# Patient Record
Sex: FEMALE | Race: WHITE | NOT HISPANIC OR LATINO | Employment: OTHER | URBAN - NONMETROPOLITAN AREA
[De-identification: names, ages, dates, MRNs, and addresses within clinical notes are randomized per-mention and may not be internally consistent; named-entity substitution may affect disease eponyms.]

---

## 2019-05-30 ENCOUNTER — APPOINTMENT (OUTPATIENT)
Dept: CT IMAGING | Facility: HOSPITAL | Age: 79
End: 2019-05-30

## 2019-05-30 ENCOUNTER — HOSPITAL ENCOUNTER (EMERGENCY)
Facility: HOSPITAL | Age: 79
Discharge: HOME | End: 2019-05-30
Attending: EMERGENCY MEDICINE | Admitting: EMERGENCY MEDICINE

## 2019-05-30 ENCOUNTER — APPOINTMENT (OUTPATIENT)
Dept: GENERAL RADIOLOGY | Facility: HOSPITAL | Age: 79
End: 2019-05-30

## 2019-05-30 VITALS
HEIGHT: 67 IN | SYSTOLIC BLOOD PRESSURE: 146 MMHG | BODY MASS INDEX: 37.67 KG/M2 | OXYGEN SATURATION: 95 % | HEART RATE: 78 BPM | TEMPERATURE: 99.6 F | RESPIRATION RATE: 20 BRPM | WEIGHT: 240 LBS | DIASTOLIC BLOOD PRESSURE: 57 MMHG

## 2019-05-30 DIAGNOSIS — N39.0 ACUTE UTI: ICD-10-CM

## 2019-05-30 DIAGNOSIS — R50.9 FEVER AND CHILLS: Primary | ICD-10-CM

## 2019-05-30 LAB
ALBUMIN SERPL-MCNC: 3.9 G/DL (ref 3.5–5)
ALBUMIN/GLOB SERPL: 1.3 G/DL (ref 1–2)
ALP SERPL-CCNC: 52 U/L (ref 38–126)
ALT SERPL W P-5'-P-CCNC: 41 U/L (ref 13–69)
ANION GAP SERPL CALCULATED.3IONS-SCNC: 13.9 MMOL/L (ref 10–20)
AST SERPL-CCNC: 33 U/L (ref 15–46)
BACTERIA UR QL AUTO: ABNORMAL /HPF
BASOPHILS # BLD AUTO: 0.03 10*3/MM3 (ref 0–0.2)
BASOPHILS NFR BLD AUTO: 0.4 % (ref 0–1.5)
BILIRUB SERPL-MCNC: 1 MG/DL (ref 0.2–1.3)
BILIRUB UR QL STRIP: NEGATIVE
BUN BLD-MCNC: 18 MG/DL (ref 7–20)
BUN/CREAT SERPL: 22.5 (ref 7.1–23.5)
CALCIUM SPEC-SCNC: 8.9 MG/DL (ref 8.4–10.2)
CHLORIDE SERPL-SCNC: 104 MMOL/L (ref 98–107)
CLARITY UR: CLEAR
CO2 SERPL-SCNC: 25 MMOL/L (ref 26–30)
COLOR UR: YELLOW
CREAT BLD-MCNC: 0.8 MG/DL (ref 0.6–1.3)
D-LACTATE SERPL-SCNC: 2.1 MMOL/L (ref 0.5–2)
DEPRECATED RDW RBC AUTO: 49.9 FL (ref 37–54)
EOSINOPHIL # BLD AUTO: 0.03 10*3/MM3 (ref 0–0.4)
EOSINOPHIL NFR BLD AUTO: 0.4 % (ref 0.3–6.2)
ERYTHROCYTE [DISTWIDTH] IN BLOOD BY AUTOMATED COUNT: 14.6 % (ref 12.3–15.4)
FLUAV AG NPH QL: NEGATIVE
FLUBV AG NPH QL IA: NEGATIVE
GFR SERPL CREATININE-BSD FRML MDRD: 70 ML/MIN/1.73
GLOBULIN UR ELPH-MCNC: 3.1 GM/DL
GLUCOSE BLD-MCNC: 102 MG/DL (ref 74–98)
GLUCOSE UR STRIP-MCNC: NEGATIVE MG/DL
HCT VFR BLD AUTO: 38.6 % (ref 34–46.6)
HGB BLD-MCNC: 12.8 G/DL (ref 12–15.9)
HGB UR QL STRIP.AUTO: NEGATIVE
HOLD SPECIMEN: NORMAL
HYALINE CASTS UR QL AUTO: ABNORMAL /LPF
IMM GRANULOCYTES # BLD AUTO: 0.02 10*3/MM3 (ref 0–0.05)
IMM GRANULOCYTES NFR BLD AUTO: 0.3 % (ref 0–0.5)
KETONES UR QL STRIP: NEGATIVE
LEUKOCYTE ESTERASE UR QL STRIP.AUTO: ABNORMAL
LYMPHOCYTES # BLD AUTO: 0.29 10*3/MM3 (ref 0.7–3.1)
LYMPHOCYTES NFR BLD AUTO: 4.2 % (ref 19.6–45.3)
MCH RBC QN AUTO: 30.8 PG (ref 26.6–33)
MCHC RBC AUTO-ENTMCNC: 33.2 G/DL (ref 31.5–35.7)
MCV RBC AUTO: 92.8 FL (ref 79–97)
MONOCYTES # BLD AUTO: 0.05 10*3/MM3 (ref 0.1–0.9)
MONOCYTES NFR BLD AUTO: 0.7 % (ref 5–12)
NEUTROPHILS # BLD AUTO: 6.42 10*3/MM3 (ref 1.7–7)
NEUTROPHILS NFR BLD AUTO: 94 % (ref 42.7–76)
NITRITE UR QL STRIP: NEGATIVE
NRBC BLD AUTO-RTO: 0 /100 WBC (ref 0–0.2)
NT-PROBNP SERPL-MCNC: 135 PG/ML (ref 0–450)
PH UR STRIP.AUTO: <=5 [PH] (ref 5–8)
PLATELET # BLD AUTO: 159 10*3/MM3 (ref 140–450)
PMV BLD AUTO: 11.8 FL (ref 6–12)
POTASSIUM BLD-SCNC: 3.9 MMOL/L (ref 3.5–5.1)
PROCALCITONIN SERPL-MCNC: <0.05 NG/ML
PROT SERPL-MCNC: 7 G/DL (ref 6.3–8.2)
PROT UR QL STRIP: NEGATIVE
RBC # BLD AUTO: 4.16 10*6/MM3 (ref 3.77–5.28)
RBC # UR: ABNORMAL /HPF
REF LAB TEST METHOD: ABNORMAL
SODIUM BLD-SCNC: 139 MMOL/L (ref 137–145)
SP GR UR STRIP: 1.02 (ref 1–1.03)
SQUAMOUS #/AREA URNS HPF: ABNORMAL /HPF
TROPONIN I SERPL-MCNC: <0.012 NG/ML (ref 0–0.03)
UROBILINOGEN UR QL STRIP: ABNORMAL
WBC NRBC COR # BLD: 6.84 10*3/MM3 (ref 3.4–10.8)
WBC UR QL AUTO: ABNORMAL /HPF

## 2019-05-30 PROCEDURE — 81001 URINALYSIS AUTO W/SCOPE: CPT | Performed by: EMERGENCY MEDICINE

## 2019-05-30 PROCEDURE — 99285 EMERGENCY DEPT VISIT HI MDM: CPT

## 2019-05-30 PROCEDURE — 84484 ASSAY OF TROPONIN QUANT: CPT | Performed by: NURSE PRACTITIONER

## 2019-05-30 PROCEDURE — 25010000002 IOPAMIDOL 61 % SOLUTION: Performed by: EMERGENCY MEDICINE

## 2019-05-30 PROCEDURE — 74177 CT ABD & PELVIS W/CONTRAST: CPT

## 2019-05-30 PROCEDURE — P9612 CATHETERIZE FOR URINE SPEC: HCPCS

## 2019-05-30 PROCEDURE — 96374 THER/PROPH/DIAG INJ IV PUSH: CPT

## 2019-05-30 PROCEDURE — 85025 COMPLETE CBC W/AUTO DIFF WBC: CPT | Performed by: NURSE PRACTITIONER

## 2019-05-30 PROCEDURE — 96361 HYDRATE IV INFUSION ADD-ON: CPT

## 2019-05-30 PROCEDURE — 80053 COMPREHEN METABOLIC PANEL: CPT | Performed by: NURSE PRACTITIONER

## 2019-05-30 PROCEDURE — 25010000002 ONDANSETRON PER 1 MG: Performed by: NURSE PRACTITIONER

## 2019-05-30 PROCEDURE — 96375 TX/PRO/DX INJ NEW DRUG ADDON: CPT

## 2019-05-30 PROCEDURE — 83880 ASSAY OF NATRIURETIC PEPTIDE: CPT | Performed by: NURSE PRACTITIONER

## 2019-05-30 PROCEDURE — 25010000002 KETOROLAC TROMETHAMINE PER 15 MG: Performed by: NURSE PRACTITIONER

## 2019-05-30 PROCEDURE — 84145 PROCALCITONIN (PCT): CPT | Performed by: NURSE PRACTITIONER

## 2019-05-30 PROCEDURE — 87804 INFLUENZA ASSAY W/OPTIC: CPT | Performed by: NURSE PRACTITIONER

## 2019-05-30 PROCEDURE — 71046 X-RAY EXAM CHEST 2 VIEWS: CPT

## 2019-05-30 PROCEDURE — 83605 ASSAY OF LACTIC ACID: CPT | Performed by: NURSE PRACTITIONER

## 2019-05-30 RX ORDER — ONDANSETRON 2 MG/ML
4 INJECTION INTRAMUSCULAR; INTRAVENOUS ONCE
Status: COMPLETED | OUTPATIENT
Start: 2019-05-30 | End: 2019-05-30

## 2019-05-30 RX ORDER — KETOROLAC TROMETHAMINE 30 MG/ML
15 INJECTION, SOLUTION INTRAMUSCULAR; INTRAVENOUS ONCE
Status: COMPLETED | OUTPATIENT
Start: 2019-05-30 | End: 2019-05-30

## 2019-05-30 RX ORDER — SODIUM CHLORIDE 0.9 % (FLUSH) 0.9 %
10 SYRINGE (ML) INJECTION AS NEEDED
Status: DISCONTINUED | OUTPATIENT
Start: 2019-05-30 | End: 2019-05-30 | Stop reason: HOSPADM

## 2019-05-30 RX ORDER — CEPHALEXIN 500 MG/1
500 CAPSULE ORAL 2 TIMES DAILY
Qty: 14 CAPSULE | Refills: 0 | Status: SHIPPED | OUTPATIENT
Start: 2019-05-30 | End: 2020-11-03

## 2019-05-30 RX ADMIN — KETOROLAC TROMETHAMINE 15 MG: 30 INJECTION, SOLUTION INTRAMUSCULAR; INTRAVENOUS at 11:16

## 2019-05-30 RX ADMIN — SODIUM CHLORIDE 1848 ML: 9 INJECTION, SOLUTION INTRAVENOUS at 10:47

## 2019-05-30 RX ADMIN — ONDANSETRON 4 MG: 2 INJECTION INTRAMUSCULAR; INTRAVENOUS at 11:11

## 2019-05-30 RX ADMIN — IOPAMIDOL 100 ML: 612 INJECTION, SOLUTION INTRAVENOUS at 13:00

## 2020-11-03 ENCOUNTER — OFFICE VISIT (OUTPATIENT)
Dept: FAMILY MEDICINE CLINIC | Facility: CLINIC | Age: 80
End: 2020-11-03

## 2020-11-03 VITALS
BODY MASS INDEX: 41.3 KG/M2 | HEIGHT: 66 IN | SYSTOLIC BLOOD PRESSURE: 128 MMHG | DIASTOLIC BLOOD PRESSURE: 70 MMHG | TEMPERATURE: 97.7 F | HEART RATE: 71 BPM | WEIGHT: 257 LBS | OXYGEN SATURATION: 98 %

## 2020-11-03 DIAGNOSIS — G25.81 RESTLESS LEGS SYNDROME: ICD-10-CM

## 2020-11-03 DIAGNOSIS — I10 ESSENTIAL HYPERTENSION: ICD-10-CM

## 2020-11-03 DIAGNOSIS — I83.813 VARICOSE VEINS OF BILATERAL LOWER EXTREMITIES WITH PAIN: ICD-10-CM

## 2020-11-03 DIAGNOSIS — I50.32 CHRONIC DIASTOLIC (CONGESTIVE) HEART FAILURE (HCC): Primary | ICD-10-CM

## 2020-11-03 DIAGNOSIS — M17.12 PRIMARY OSTEOARTHRITIS OF LEFT KNEE: ICD-10-CM

## 2020-11-03 DIAGNOSIS — M25.562 ARTHRALGIA OF LEFT KNEE: ICD-10-CM

## 2020-11-03 PROCEDURE — 99203 OFFICE O/P NEW LOW 30 MIN: CPT | Performed by: FAMILY MEDICINE

## 2020-11-03 RX ORDER — FUROSEMIDE 20 MG/1
20 TABLET ORAL DAILY
Qty: 60 TABLET | Refills: 0 | Status: SHIPPED | OUTPATIENT
Start: 2020-11-03 | End: 2020-12-17 | Stop reason: SDUPTHER

## 2020-11-03 RX ORDER — LOSARTAN POTASSIUM 100 MG/1
TABLET ORAL
COMMUNITY
End: 2021-01-15 | Stop reason: SDUPTHER

## 2020-11-03 RX ORDER — POTASSIUM CHLORIDE 750 MG/1
10 TABLET, FILM COATED, EXTENDED RELEASE ORAL DAILY
Qty: 30 TABLET | Refills: 0 | Status: SHIPPED | OUTPATIENT
Start: 2020-11-03 | End: 2020-12-17 | Stop reason: SDUPTHER

## 2020-11-03 RX ORDER — PRAMIPEXOLE DIHYDROCHLORIDE 0.5 MG/1
TABLET ORAL
COMMUNITY
Start: 2020-10-26 | End: 2020-12-17

## 2020-11-03 NOTE — PROGRESS NOTES
"    Established Patient        Chief Complaint:   Chief Complaint   Patient presents with   • Establish Care     former patient of [facility]; BLE swelling with shortness of breath        Seema Petit is a 80 y.o. female    History of Present Illness:       Subjective     The following portions of the patient's history were reviewed and updated as appropriate: allergies, current medications, past family history, past medical history, past social history, past surgical history and problem list.    Allergies   Allergen Reactions   • Quinine Derivatives Other (See Comments)     FLU LIKE SYMPTOMS       Review of Systems  1. Constitutional: Negative for fever. Negative for chills, diaphoresis, fatigue and unexpected weight change.   2. HENT: No dysphagia; no changes to vision/hearing/smell/taste; no epistaxis  3. Eyes: Negative for redness and visual disturbance.   4. Respiratory: negative for shortness of breath. Negative for chest pain . Negative for cough and chest tightness.   5. Cardiovascular: Negative for chest pain and palpitations.   6. Gastrointestinal: Negative for abdominal distention, abdominal pain and blood in stool.   7. Endocrine: Negative for cold intolerance and heat intolerance.   8. Genitourinary: Negative for difficulty urinating, dysuria and frequency.   9. Musculoskeletal: Negative for arthralgias, back pain and myalgias.   10. Skin: Negative for color change, rash and wound.   11. Neurological: Negative for syncope, weakness and headaches.   12. Hematological: Negative for adenopathy. Does not bruise/bleed easily.   13. Psychiatric/Behavioral: Negative for confusion. The patient is not nervous/anxious.    Objective     Physical Exam   Vital Signs: /70   Pulse 71   Temp 97.7 °F (36.5 °C)   Ht 167.6 cm (66\")   Wt 117 kg (257 lb)   SpO2 98%   BMI 41.48 kg/m²     General Appearance: alert, oriented x 3, no acute distress.  Skin: warm and dry.   HEENT: Atraumatic.  pupils round and reactive " to light and accommodation, oral mucosa pink and moist.  Nares patent without epistaxis.  External auditory canals are patent tympanic membranes intact.  Neck: supple, no JVD, trachea midline.  No thyromegaly  Lungs: CTA, unlabored breathing effort.  Heart: RRR, normal S1 and S2, no S3, no rub.  Abdomen: soft, non-tender, no palpable bladder, present bowel sounds to auscultation ×4.  No guarding or rigidity.  Extremities: no clubbing, cyanosis or edema.  Good range of motion actively and passively; crepitance to marina knees on A/P ROM, medial/lateral joint line tenderness marina knees; quad mech intact.  Symmetric muscle strength and development; post-surgical scarring to left hip from LONG.  Neuro: normal speech and mental status.  Cranial nerves II through XII intact.  No anosmia. DTR 2+; proprioception intact.  No focal motor/sensory deficits.    Assessment and Plan      Assessment:   Diagnoses and all orders for this visit:    1. Chronic diastolic (congestive) heart failure (CMS/HCC) (Primary)  -     furosemide (Lasix) 20 MG tablet; Take 1 tablet by mouth Daily.  Dispense: 60 tablet; Refill: 0  -     potassium chloride 10 MEQ CR tablet; Take 1 tablet by mouth Daily.  Dispense: 30 tablet; Refill: 0    2. Essential hypertension    3. Varicose veins of bilateral lower extremities with pain  -     furosemide (Lasix) 20 MG tablet; Take 1 tablet by mouth Daily.  Dispense: 60 tablet; Refill: 0  -     potassium chloride 10 MEQ CR tablet; Take 1 tablet by mouth Daily.  Dispense: 30 tablet; Refill: 0    4. Primary osteoarthritis of left knee    5. Arthralgia of left knee    6. Restless legs syndrome        Plan:  Request last xrays of knee from [facility].    Likely to need joint injection at f/u appt in 1 week.    Start lasix bid dosing x planned 1 week; f/u in 1 week and possibly dec lasix to once daily.    Needs establishment with cardiology; referral placed to see Dr. Peter here at [place] [facility].    Will order stress echo  at f/u appt.  Discussion Summary:    Discussed plan of care in detail with pt today; pt verb understanding and agrees.    I have reviewed and updated all copied forward information, as appropriate.  I attest to the accuracy and relevance of any unchanged information.    Follow up:  No follow-ups on file.     There are no Patient Instructions on file for this visit.    Lan Hollins, DO  11/03/20  11:34 EST          Please note that portions of this note may have been completed with a voice recognition program. Efforts were made to edit the dictations, but occasionally words are mistranscribed.

## 2020-11-07 NOTE — PROGRESS NOTES
New Patient History and Physical      Referring Physician: No ref. provider found    Chief Complaint:    Chief Complaint   Patient presents with   • Establish Care     former patient of [facility]; BLE swelling with shortness of breath       History of Present Illness:   Patient is a 80-year-old female who is here to establish with a new primary care provider.  She has numerous chronic comorbid conditions, including chronic diastolic heart failure, hypertension, varicose veins of the lower extremities, osteoarthritis of joints of the knees, and restless leg syndrome.  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  This creates a significant amount of situational stress in her life, as she is often times fearful and hesitant to leave him, even to care for herself.    Patient has noticed significant worsening of the edematous changes to her lower extremities, has a diagnosis of severe varicose veins, underwent unsuccessful vein treatment procedure in the past.  She denies any significant changes to her hydration status, nor to her sodium/salt intake.  She denies orthopnea.  Patient does admit to some easy fatigability and dyspnea on exertion.  No associated diaphoresis.    Patient does complain of painful left knee, underwent x-rays in the past.  She was told she has osteoarthritis.  Denies any single episode of trauma.  Occasional worsened edema to the left knee with increased activity or prolonged standing.  She denies any fever, chills or night sweats.    Has a balanced diet, denies any aspiration or dysphagia.  No bright red blood or black tarry stools.  Maintains good urine output, including adequate diuresis.  Known history of restless leg syndrome.    Subjective     Review of Systems     1. Constitutional: Negative for fever. Negative for chills, diaphoresis, fatigue and unexpected weight change.   2. HENT: No dysphagia; no changes to vision/hearing/smell/taste; no epistaxis  3. Eyes:  Negative for redness and visual disturbance.   4. Respiratory: negative for shortness of breath. Negative for chest pain . Negative for cough and chest tightness.   5. Cardiovascular: Negative for chest pain and palpitations.   6. Gastrointestinal: Negative for abdominal distention, abdominal pain and blood in stool.   7. Endocrine: Negative for cold intolerance and heat intolerance.   8. Genitourinary: Negative for difficulty urinating, dysuria and frequency.   9. Musculoskeletal: Chronic arthralgias, back pain and myalgias.   10. Skin: Negative for color change, rash and wound.   11. Neurological: Negative for syncope, weakness and headaches.   12. Hematological: Negative for adenopathy. Does not bruise/bleed easily.   13. Psychiatric/Behavioral: Negative for confusion. The patient is not nervous/anxious.     The following portions of the patient's history were reviewed and updated as appropriate: allergies, current medications, past family history, past medical history, past social history, past surgical history and problem list.    Past Medical History:   Past Medical History:   Diagnosis Date   • Angina pectoris (CMS/HCC)    • Arthritis    • CHF (congestive heart failure) (CMS/HCC)    • Cholelithiasis    • Chronic diastolic heart failure (CMS/HCC)    • GERD (gastroesophageal reflux disease)    • Gout    • Hyperlipidemia    • Infectious viral hepatitis    • Kidney disease    • RLS (restless legs syndrome)        Past Surgical History:  Past Surgical History:   Procedure Laterality Date   • EYE SURGERY     • HYSTERECTOMY     • TOTAL HIP ARTHROPLASTY Bilateral    • VARICOSE VEIN SURGERY         Family History: family history includes Cancer in her mother, sister, and son; Heart attack in her brother and father; Kidney disease in her mother; Stroke in her father.    Social History:  reports that she has never smoked. She has never used smokeless tobacco. She reports that she does not drink alcohol or use  "drugs.    Medications:    Current Outpatient Medications:   •  losartan (COZAAR) 100 MG tablet, losartan 100 mg tablet  1 po qd, Disp: , Rfl:   •  pramipexole (MIRAPEX) 0.5 MG tablet, , Disp: , Rfl:   •  furosemide (Lasix) 20 MG tablet, Take 1 tablet by mouth Daily., Disp: 60 tablet, Rfl: 0  •  potassium chloride 10 MEQ CR tablet, Take 1 tablet by mouth Daily., Disp: 30 tablet, Rfl: 0    Allergies:  Allergies   Allergen Reactions   • Quinine Derivatives Other (See Comments)     FLU LIKE SYMPTOMS       Objective     Physical Exam:  Vital Signs: /70   Pulse 71   Temp 97.7 °F (36.5 °C)   Ht 167.6 cm (66\")   Wt 117 kg (257 lb)   SpO2 98%   BMI 41.48 kg/m²      General Appearance: alert, oriented x 3, no acute distress.  Pleasant and interactive during questioning and examination.  Skin: warm and dry.  No jaundice.  HEENT: Atraumatic.  pupils round and reactive to light and accommodation, oral mucosa pink and moist.  Nares patent without epistaxis.  External auditory canals are patent tympanic membranes intact.  Neck: supple, no JVD, trachea midline.  No thyromegaly  Lungs: CTA, unlabored breathing effort.  Heart: RRR, normal S1 and S2, no S3, no rub.  Abdomen: soft, non-tender, no palpable bladder, present bowel sounds to auscultation ×4.  No guarding or rigidity.  Extremities: no clubbing, cyanosis.  Good range of motion actively and passively; crepitance to marina knees on A/P ROM, medial/lateral joint line tenderness marina knees; quad mech intact.  Symmetric muscle strength and development; post-surgical scarring to left hip from LONG.  Diffuse varicose veins noted to the lower extremities with 1+ pitting edema that extends to the proximal third of the tibias bilaterally.  Neuro: normal speech and mental status.  Cranial nerves II through XII intact.  No anosmia. DTR 2+; proprioception intact.  No focal motor/sensory deficits.    Advance Care Planning   ACP discussion was held with the patient during this " visit. Patient does not have an advance directive, information provided.    Assessment / Plan     Assessment:   Diagnoses and all orders for this visit:    1. Chronic diastolic (congestive) heart failure (CMS/HCC) (Primary)  -     furosemide (Lasix) 20 MG tablet; Take 1 tablet by mouth Daily.  Dispense: 60 tablet; Refill: 0  -     potassium chloride 10 MEQ CR tablet; Take 1 tablet by mouth Daily.  Dispense: 30 tablet; Refill: 0    2. Essential hypertension    3. Varicose veins of bilateral lower extremities with pain  -     furosemide (Lasix) 20 MG tablet; Take 1 tablet by mouth Daily.  Dispense: 60 tablet; Refill: 0  -     potassium chloride 10 MEQ CR tablet; Take 1 tablet by mouth Daily.  Dispense: 30 tablet; Refill: 0    4. Primary osteoarthritis of left knee    5. Arthralgia of left knee    6. Restless legs syndrome        Plan:  Planned referral to cardiology.    Adding Lasix, twice daily dosing for the next week, in addition to potassium supplementation.  Patient is to return in 1 week's time for repeat evaluation.  I have asked that she check her weight daily at the same time.  Limit sodium/salt dietary intake.  Of also stressed the importance of compression to the lower extremities, or elevation of the lower extremities to aid with some of the gravity dependent qualities.  Consider decreasing to once daily diuresis at follow-up visit.    Request x-rays recently completed at [facility] in [place].  Clinically I feel she would benefit from a steroid injection to her left knee, however will defer this until after review of her x-rays, this can be done at follow-up visit in 1 week.    Continue current dose of Mirapex.    All signs demonstrate hemodynamic stability, continue current dose of losartan.  Discussion Summary:    Discussed plan of care in detail with pt today; pt verb understanding and agrees.  Follow up:  Return in about 1 week (around 11/10/2020) for Medicare Wellness, initial.     There are no  Patient Instructions on file for this visit.    Lan Hollins, DO  11/06/20  19:31 EST    Please note that portions of this note may have been completed with a voice recognition program. Efforts were made to edit the dictations, but occasionally words are mistranscribed.

## 2020-11-10 ENCOUNTER — OFFICE VISIT (OUTPATIENT)
Dept: FAMILY MEDICINE CLINIC | Facility: CLINIC | Age: 80
End: 2020-11-10

## 2020-11-10 VITALS
DIASTOLIC BLOOD PRESSURE: 70 MMHG | HEIGHT: 66 IN | BODY MASS INDEX: 40.5 KG/M2 | OXYGEN SATURATION: 98 % | HEART RATE: 74 BPM | WEIGHT: 252 LBS | SYSTOLIC BLOOD PRESSURE: 120 MMHG

## 2020-11-10 DIAGNOSIS — Z99.89 OBSTRUCTIVE SLEEP APNEA ON CPAP: ICD-10-CM

## 2020-11-10 DIAGNOSIS — Z00.00 ROUTINE GENERAL MEDICAL EXAMINATION AT HEALTH CARE FACILITY: Primary | ICD-10-CM

## 2020-11-10 DIAGNOSIS — I10 ESSENTIAL HYPERTENSION: ICD-10-CM

## 2020-11-10 DIAGNOSIS — G47.33 OBSTRUCTIVE SLEEP APNEA ON CPAP: ICD-10-CM

## 2020-11-10 DIAGNOSIS — M17.12 PRIMARY OSTEOARTHRITIS OF LEFT KNEE: ICD-10-CM

## 2020-11-10 DIAGNOSIS — F51.01 PRIMARY INSOMNIA: ICD-10-CM

## 2020-11-10 DIAGNOSIS — M25.562 ARTHRALGIA OF LEFT KNEE: ICD-10-CM

## 2020-11-10 DIAGNOSIS — I83.813 VARICOSE VEINS OF BILATERAL LOWER EXTREMITIES WITH PAIN: ICD-10-CM

## 2020-11-10 DIAGNOSIS — I50.32 CHRONIC DIASTOLIC (CONGESTIVE) HEART FAILURE (HCC): ICD-10-CM

## 2020-11-10 DIAGNOSIS — G25.81 RESTLESS LEGS SYNDROME: ICD-10-CM

## 2020-11-10 DIAGNOSIS — R53.81 MALAISE AND FATIGUE: ICD-10-CM

## 2020-11-10 DIAGNOSIS — Z23 NEED FOR INFLUENZA VACCINATION: ICD-10-CM

## 2020-11-10 DIAGNOSIS — R53.83 MALAISE AND FATIGUE: ICD-10-CM

## 2020-11-10 PROBLEM — Z63.6 CAREGIVER STRESS: Chronic | Status: ACTIVE | Noted: 2020-11-10

## 2020-11-10 PROCEDURE — G0008 ADMIN INFLUENZA VIRUS VAC: HCPCS | Performed by: FAMILY MEDICINE

## 2020-11-10 PROCEDURE — G0438 PPPS, INITIAL VISIT: HCPCS | Performed by: FAMILY MEDICINE

## 2020-11-10 PROCEDURE — 90694 VACC AIIV4 NO PRSRV 0.5ML IM: CPT | Performed by: FAMILY MEDICINE

## 2020-11-10 PROCEDURE — 99213 OFFICE O/P EST LOW 20 MIN: CPT | Performed by: FAMILY MEDICINE

## 2020-11-10 RX ORDER — TRAZODONE HYDROCHLORIDE 50 MG/1
50 TABLET ORAL NIGHTLY
Qty: 30 TABLET | Refills: 3 | Status: SHIPPED | OUTPATIENT
Start: 2020-11-10 | End: 2020-12-17 | Stop reason: SDUPTHER

## 2020-11-10 RX ORDER — ASPIRIN 81 MG/1
81 TABLET ORAL DAILY
Qty: 30 TABLET
Start: 2020-11-10 | End: 2021-12-02

## 2020-11-10 NOTE — PROGRESS NOTES
The ABCs of the Annual Wellness Visit  Initial Medicare Wellness Visit    Chief Complaint   Patient presents with   • Medicare Wellness-Initial Visit       Subjective   History of Present Illness:  Seema Petit is a 80 y.o. female who presents for an Initial Medicare Wellness Visit.    Patient also here for follow-up of lower extremity edema as result of known chronic varicose veins.  She has tolerated diuresis well, demonstrates adequate urine output.  She has noticed a 5 pound weight transition since last visit.  She has utilized over the course of the preceding 7 days twice daily dosing of Lasix.  She denies any orthopnea, states her energy level is improved with less dyspnea on exertion.  Patient also complains of chronic difficulty with sleep, predominantly with sleep maintenance.  She has tried numerous sleep hygiene changes without significant benefit.    HEALTH RISK ASSESSMENT    Recent Hospitalizations:  No hospitalization(s) within the last year.    Current Medical Providers:  Patient Care Team:  Lan Hollins DO as PCP - General (Family Medicine)    Smoking Status:  Social History     Tobacco Use   Smoking Status Never Smoker   Smokeless Tobacco Never Used       Alcohol Consumption:  Social History     Substance and Sexual Activity   Alcohol Use No   • Frequency: Never       Depression Screen:   PHQ-2/PHQ-9 Depression Screening 11/10/2020   Little interest or pleasure in doing things 0   Feeling down, depressed, or hopeless 0   Total Score 0       Fall Risk Screen:  STEADI Fall Risk Assessment was completed, and patient is at MODERATE risk for falls. Assessment completed on:11/10/2020    Health Habits and Functional and Cognitive Screening:  Functional & Cognitive Status 11/10/2020   Do you have difficulty preparing food and eating? No   Do you have difficulty bathing yourself, getting dressed or grooming yourself? No   Do you have difficulty using the toilet? No   Do you have difficulty moving  around from place to place? Yes   Do you have trouble with steps or getting out of a bed or a chair? Yes   Current Diet Well Balanced Diet   Dental Exam Up to date   Eye Exam Up to date   Exercise (times per week) 0 times per week   Current Exercise Activities Include None   Do you need help using the phone?  No   Are you deaf or do you have serious difficulty hearing?  Yes   Do you need help with transportation? No   Do you need help shopping? Yes   Do you need help preparing meals?  No   Do you need help with housework?  Yes   Do you need help with laundry? No   Do you need help taking your medications? No   Do you need help managing money? No   Do you ever drive or ride in a car without wearing a seat belt? Yes   Have you felt unusual stress, anger or loneliness in the last month? No   Who do you live with? Spouse   If you need help, do you have trouble finding someone available to you? No   Do you have difficulty concentrating, remembering or making decisions? No         Does the patient have evidence of cognitive impairment? No    Asprin use counseling:Taking ASA appropriately as indicated    Age-appropriate Screening Schedule:  Refer to the list below for future screening recommendations based on patient's age, sex and/or medical conditions. Orders for these recommended tests are listed in the plan section. The patient has been provided with a written plan.    Health Maintenance   Topic Date Due   • ZOSTER VACCINE (1 of 2) 10/01/1992   • INFLUENZA VACCINE  08/01/2020   • LIPID PANEL  11/03/2020   • TDAP/TD VACCINES (2 - Td) 06/22/2027          The following portions of the patient's history were reviewed and updated as appropriate: allergies, current medications, past family history, past medical history, past social history, past surgical history and problem list.    Outpatient Medications Prior to Visit   Medication Sig Dispense Refill   • furosemide (Lasix) 20 MG tablet Take 1 tablet by mouth Daily. 60  tablet 0   • losartan (COZAAR) 100 MG tablet losartan 100 mg tablet   1 po qd     • potassium chloride 10 MEQ CR tablet Take 1 tablet by mouth Daily. 30 tablet 0   • pramipexole (MIRAPEX) 0.5 MG tablet        No facility-administered medications prior to visit.        Patient Active Problem List   Diagnosis   • Arthralgia of left knee   • Varicose veins of bilateral lower extremities with pain   • Primary osteoarthritis of left knee   • Essential hypertension   • Chronic diastolic (congestive) heart failure (CMS/HCC)   • Restless legs syndrome   • Caregiver stress   • Primary insomnia   • Obstructive sleep apnea on CPAP       Advanced Care Planning:  ACP discussion was held with the patient during this visit. Patient does not have an advance directive, information provided.    Review of Systems   Constitutional: Positive for activity change (Improved) and fatigue. Negative for appetite change, chills, diaphoresis, fever and unexpected weight change.   HENT: Negative for congestion, dental problem, drooling, ear discharge, ear pain, facial swelling, hearing loss, mouth sores, nosebleeds, postnasal drip, rhinorrhea, sinus pressure, sneezing, sore throat, tinnitus, trouble swallowing and voice change.    Eyes: Negative for photophobia, pain, discharge, redness, itching and visual disturbance.   Respiratory: Negative for apnea, cough, choking, chest tightness, shortness of breath, wheezing and stridor.    Cardiovascular: Negative for chest pain, palpitations and leg swelling.   Gastrointestinal: Negative for abdominal distention, abdominal pain, anal bleeding, blood in stool, constipation, diarrhea, nausea, rectal pain and vomiting.   Endocrine: Negative for cold intolerance, heat intolerance, polydipsia, polyphagia and polyuria.   Genitourinary: Negative for decreased urine volume, difficulty urinating, dysuria, enuresis, flank pain, frequency, genital sores, hematuria and urgency.   Musculoskeletal: Positive for  "arthralgias (Left knee) and joint swelling (Chronic bilateral lower extremities). Negative for back pain, gait problem, myalgias, neck pain and neck stiffness.   Skin: Negative for color change, pallor, rash and wound.   Allergic/Immunologic: Negative for food allergies and immunocompromised state.   Neurological: Negative for dizziness, tremors, seizures, syncope, facial asymmetry, speech difficulty, weakness, light-headedness, numbness and headaches.   Hematological: Negative for adenopathy. Does not bruise/bleed easily.   Psychiatric/Behavioral: Positive for sleep disturbance. Negative for agitation, behavioral problems, confusion, decreased concentration, dysphoric mood, hallucinations, self-injury and suicidal ideas. The patient is nervous/anxious (Caregiver stress). The patient is not hyperactive.        Compared to one year ago, the patient feels her physical health is better.  Compared to one year ago, the patient feels her mental health is better.    Reviewed chart for potential of high risk medication in the elderly: not applicable  Reviewed chart for potential of harmful drug interactions in the elderly:not applicable    Objective         Vitals:    11/10/20 0926   BP: 120/70   Pulse: 74   SpO2: 98%   Weight: 114 kg (252 lb)   Height: 167.6 cm (66\")   PainSc:   6   PainLoc: Back       Body mass index is 40.67 kg/m².  Discussed the patient's BMI with her. The BMI is above average; BMI management plan is completed.    Physical Exam  Vitals signs and nursing note reviewed.   Constitutional:       General: She is not in acute distress.     Appearance: She is well-developed. She is not diaphoretic.   HENT:      Head: Normocephalic and atraumatic.      Right Ear: External ear normal.      Left Ear: External ear normal.      Nose: Nose normal.      Mouth/Throat:      Pharynx: No oropharyngeal exudate.   Eyes:      General: No scleral icterus.        Right eye: No discharge.         Left eye: No discharge.      " Conjunctiva/sclera: Conjunctivae normal.      Pupils: Pupils are equal, round, and reactive to light.   Neck:      Musculoskeletal: Normal range of motion and neck supple.      Thyroid: No thyromegaly.      Vascular: No JVD.      Trachea: No tracheal deviation.   Cardiovascular:      Rate and Rhythm: Normal rate.      Heart sounds: Normal heart sounds. No murmur. No friction rub. No gallop.    Pulmonary:      Effort: Pulmonary effort is normal. No respiratory distress.      Breath sounds: Normal breath sounds. No stridor. No wheezing or rales.   Chest:      Chest wall: No tenderness.   Abdominal:      General: Bowel sounds are normal. There is no distension.      Palpations: Abdomen is soft. There is no mass.      Tenderness: There is no abdominal tenderness. There is no guarding or rebound.      Hernia: No hernia is present.   Genitourinary:     Comments: Pt defers  Musculoskeletal: Normal range of motion.         General: Swelling (Chronic venous insufficiency with 1+ pitting edema that extends to the distal third of the tibias) present. No tenderness or deformity.      Comments: Quadriceps mechanism intact bilateral lower extremities, no leg length discrepancies.  Mildly antalgic gait but independent.  Mild medial/lateral joint line tenderness noted to the left knee, crepitance noted on A/P range of motion testing bilaterally.  Intact dorsiflexion/plantar flexion of bilateral feet.  2+ dorsalis pedis/posterior tibialis pulses.  Nails are in good condition, sensation intact x3.   Lymphadenopathy:      Cervical: No cervical adenopathy.   Skin:     General: Skin is warm.      Coloration: Skin is not pale.      Findings: No erythema or rash.   Neurological:      Mental Status: She is alert and oriented to person, place, and time.      Cranial Nerves: No cranial nerve deficit.      Motor: No abnormal muscle tone.      Coordination: Coordination normal.      Deep Tendon Reflexes: Reflexes are normal and symmetric.  Reflexes normal.   Psychiatric:         Behavior: Behavior normal.         Thought Content: Thought content normal.         Judgment: Judgment normal.               Assessment/Plan   Medicare Risks and Personalized Health Plan  CMS Preventative Services Quick Reference  Advance Directive Discussion  Cardiovascular risk  Colon Cancer Screening  Immunizations Discussed/Encouraged (specific immunizations; Influenza )  Inactivity/Sedentary    The above risks/problems have been discussed with the patient.  Pertinent information has been shared with the patient in the After Visit Summary.  Follow up plans and orders are seen below in the Assessment/Plan Section.    Diagnoses and all orders for this visit:    1. Routine general medical examination at health care facility (Primary)  -     CBC & Differential  -     Comprehensive Metabolic Panel    2. Need for influenza vaccination  -     Fluad Quad 65+ yrs ([id number])    3. Essential hypertension  -     aspirin (aspirin) 81 MG EC tablet; Take 1 tablet by mouth Daily.  Dispense: 30 tablet  -     CBC & Differential  -     TSH  -     T4, Free  -     Vitamin B12  -     Comprehensive Metabolic Panel    4. Chronic diastolic (congestive) heart failure (CMS/HCC)  -     aspirin (aspirin) 81 MG EC tablet; Take 1 tablet by mouth Daily.  Dispense: 30 tablet  -     CBC & Differential  -     Comprehensive Metabolic Panel    5. Varicose veins of bilateral lower extremities with pain    6. Primary osteoarthritis of left knee    7. Arthralgia of left knee    8. Restless legs syndrome  -     CBC & Differential  -     TSH  -     T4, Free  -     Vitamin B12  -     Comprehensive Metabolic Panel    9. Obstructive sleep apnea on CPAP    10. Primary insomnia  -     traZODone (DESYREL) 50 MG tablet; Take 1 tablet by mouth Every Night.  Dispense: 30 tablet; Refill: 3  -     CBC & Differential  -     TSH  -     T4, Free  -     Vitamin B12  -     Comprehensive Metabolic Panel    11. Malaise and  fatigue  -     CBC & Differential  -     TSH  -     T4, Free  -     Vitamin B12  -     Comprehensive Metabolic Panel      Follow Up:  No follow-ups on file.     An After Visit Summary and PPPS were given to the patient.       I recommended reducing Lasix dosing to once daily.  Planned surveillance BMP for monitoring electrolytes and renal function.  Of stress importance to avoid sodium/salt is much as able.  Daily weight checks need to be continued.  Surveillance labs today will include CBC/CMP/vitamin B12/thyroid studies.    Addition of trazodone to her treatment regimen, initially for the first 7 days will use half tablet dosing.  Plan to follow clinically, change to full tablet strength 50 mg if tolerated/needed.  She will notify the office should she develop any ill effects to the medication.

## 2020-11-10 NOTE — PATIENT INSTRUCTIONS
Breast Self-Awareness  Breast self-awareness means being familiar with how your breasts look and feel. It involves checking your breasts regularly and reporting any changes to your health care provider.  Practicing breast self-awareness is important. Sometimes changes may not be harmful (are benign), but sometimes a change in your breasts can be a sign of a serious medical problem. It is important to learn how to do this procedure correctly so that you can catch problems early, when treatment is more likely to be successful. All women should practice breast self-awareness, including women who have had breast implants.  What you need:  · A mirror.  · A well-lit room.  How to do a breast self-exam  A breast self-exam is one way to learn what is normal for your breasts and whether your breasts are changing. To do a breast self-exam:  Look for changes    1. Remove all the clothing above your waist.  2.  front of a mirror in a room with good lighting.  3. Put your hands on your hips.  4. Push your hands firmly downward.  5. Compare your breasts in the mirror. Look for differences between them (asymmetry), such as:  ? Differences in shape.  ? Differences in size.  ? Puckers, dips, and bumps in one breast and not the other.  6. Look at each breast for changes in the skin, such as:  ? Redness.  ? Scaly areas.  7. Look for changes in your nipples, such as:  ? Discharge.  ? Bleeding.  ? Dimpling.  ? Redness.  ? A change in position.  Feel for changes  Carefully feel your breasts for lumps and changes. It is best to do this while lying on your back on the floor, and again while sitting or standing in the tub or shower with soapy water on your skin. Feel each breast in the following way:  1. Place the arm on the side of the breast you are examining above your head.  2. Feel your breast with the other hand.  3. Start in the nipple area and make ¾-inch (2 cm) overlapping circles to feel your breast. Use the pads of  your three middle fingers to do this. Apply light pressure, then medium pressure, then firm pressure. The light pressure will allow you to feel the tissue closest to the skin. The medium pressure will allow you to feel the tissue that is a little deeper. The firm pressure will allow you to feel the tissue close to the ribs.  4. Continue the overlapping circles, moving downward over the breast until you feel your ribs below your breast.  5. Move one finger-width toward the center of the body. Continue to use the ¾-inch (2 cm) overlapping circles to feel your breast as you move slowly up toward your collarbone.  6. Continue the up-and-down exam using all three pressures until you reach your armpit.    Write down what you find  Writing down what you find can help you remember what to discuss with your health care provider. Write down:  · What is normal for each breast.  · Any changes that you find in each breast, including:  ? The kind of changes you find.  ? Any pain or tenderness.  ? Size and location of any lumps.  · Where you are in your menstrual cycle, if you are still menstruating.  General tips and recommendations  · Examine your breasts every month.  · If you are breastfeeding, the best time to examine your breasts is after a feeding or after using a breast pump.  · If you menstruate, the best time to examine your breasts is 5-7 days after your period. Breasts are generally lumpier during menstrual periods, and it may be more difficult to notice changes.  · With time and practice, you will become more familiar with the variations in your breasts and more comfortable with the exam.  Contact a health care provider if you:  · See a change in the shape or size of your breasts or nipples.  · See a change in the skin of your breast or nipples, such as a reddened or scaly area.  · Have unusual discharge from your nipples.  · Find a lump or thick area that was not there before.  · Have pain in your breasts.  · Have  any concerns related to your breast health.  Summary  · Breast self-awareness includes looking for physical changes in your breasts, as well as feeling for any changes within your breasts.  · Breast self-awareness should be performed in front of a mirror in a well-lit room.  · You should examine your breasts every month. If you menstruate, the best time to examine your breasts is 5-7 days after your menstrual period.  · Let your health care provider know of any changes you notice in your breasts, including changes in size, changes on the skin, pain or tenderness, or unusual fluid from your nipples.  This information is not intended to replace advice given to you by your health care provider. Make sure you discuss any questions you have with your health care provider.  Document Released: 12/18/2006 Document Revised: 08/06/2019 Document Reviewed: 08/06/2019  Elsevier Patient Education © 2020 [employer]      Breast Self-Awareness  Breast self-awareness is knowing how your breasts look and feel. Doing breast self-awareness is important. It allows you to catch a breast problem early while it is still small and can be treated. All women should do breast self-awareness, including women who have had breast implants. Tell your doctor if you notice a change in your breasts.  What you need:  · A mirror.  · A well-lit room.  How to do a breast self-exam  A breast self-exam is one way to learn what is normal for your breasts and to check for changes. To do a breast self-exam:  Look for changes    1. Take off all the clothes above your waist.  2.  front of a mirror in a room with good lighting.  3. Put your hands on your hips.  4. Push your hands down.  5. Look at your breasts and nipples in the mirror to see if one breast or nipple looks different from the other. Check to see if:  ? The shape of one breast is different.  ? The size of one breast is different.  ? There are wrinkles, dips, and bumps in one breast and  not the other.  6. Look at each breast for changes in the skin, such as:  ? Redness.  ? Scaly areas.  7. Look for changes in your nipples, such as:  ? Liquid around the nipples.  ? Bleeding.  ? Dimpling.  ? Redness.  ? A change in where the nipples are.  Feel for changes    1. Lie on your back on the floor.  2. Feel each breast. To do this, follow these steps:  ? Pick a breast to feel.  ? Put the arm closest to that breast above your head.  ? Use your other arm to feel the nipple area of your breast. Feel the area with the pads of your three middle fingers by making small circles with your fingers. For the first [place], press lightly. For the second [place], press harder. For the third [place], press even harder.  ? Keep making circles with your fingers at the different pressures as you move down your breast. Stop when you feel your ribs.  ? Move your fingers a little toward the center of your body.  ? Start making circles with your fingers again, this time going up until you reach your collarbone.  ? Keep making up-and-down circles until you reach your armpit. Remember to keep using the three pressures.  ? Feel the other breast in the same way.  3. Sit or  the tub or shower.  4. With soapy water on your skin, feel each breast the same way you did in step 2 when you were lying on the floor.  Write down what you find  Writing down what you find can help you remember what to tell your doctor. Write down:  · What is normal for each breast.  · Any changes you find in each breast, including:  ? The kind of changes you find.  ? Whether you have pain.  ? Size and location of any lumps.  · When you last had your menstrual period.  General tips  · Check your breasts every month.  · If you are breastfeeding, the best time to check your breasts is after you feed your baby or after you use a breast pump.  · If you get menstrual periods, the best time to check your breasts is 5-7 days after your menstrual period is  over.  · With time, you will become comfortable with the self-exam, and you will begin to know if there are changes in your breasts.  Contact a doctor if you:  · See a change in the shape or size of your breasts or nipples.  · See a change in the skin of your breast or nipples, such as red or scaly skin.  · Have fluid coming from your nipples that is not normal.  · Find a lump or thick area that was not there before.  · Have pain in your breasts.  · Have any concerns about your breast health.  Summary  · Breast self-awareness includes looking for changes in your breasts, as well as feeling for changes within your breasts.  · Breast self-awareness should be done in front of a mirror in a well-lit room.  · You should check your breasts every month. If you get menstrual periods, the best time to check your breasts is 5-7 days after your menstrual period is over.  · Let your doctor know of any changes you see in your breasts, including changes in size, changes on the skin, pain or tenderness, or fluid from your nipples that is not normal.  This information is not intended to replace advice given to you by your health care provider. Make sure you discuss any questions you have with your health care provider.  Document Released: 06/05/2009 Document Revised: 08/06/2019 Document Reviewed: 08/06/2019  [employer] Patient Education © 2020 [employer]      Exercising to Lose Weight  Exercise is structured, repetitive physical activity to improve fitness and health. Getting regular exercise is important for everyone. It is especially important if you are overweight. Being overweight increases your risk of heart disease, stroke, diabetes, high blood pressure, and several types of cancer. Reducing your calorie intake and exercising can help you lose weight.  Exercise is usually categorized as moderate or vigorous intensity. To lose weight, most people need to do a certain amount of moderate-intensity or vigorous-intensity exercise  each week.  Moderate-intensity exercise    Moderate-intensity exercise is any activity that gets you moving enough to burn at least three times more energy (calories) than if you were sitting.  Examples of moderate exercise include:  · Walking a mile in 15 minutes.  · Doing light yard work.  · Biking at an easy pace.  Most people should get at least 150 minutes (2 hours and 30 minutes) a week of moderate-intensity exercise to maintain their body weight.  Vigorous-intensity exercise  Vigorous-intensity exercise is any activity that gets you moving enough to burn at least six times more calories than if you were sitting. When you exercise at this intensity, you should be working hard enough that you are not able to carry on a conversation.  Examples of vigorous exercise include:  · Running.  · Playing a team sport, such as football, basketball, and soccer.  · Jumping rope.  Most people should get at least 75 minutes (1 hour and 15 minutes) a week of vigorous-intensity exercise to maintain their body weight.  How can exercise affect me?  When you exercise enough to burn more calories than you eat, you lose weight. Exercise also reduces body fat and builds muscle. The more muscle you have, the more calories you burn. Exercise also:  · Improves mood.  · Reduces stress and tension.  · Improves your overall fitness, flexibility, and endurance.  · Increases bone strength.  The amount of exercise you need to lose weight depends on:  · Your age.  · The type of exercise.  · Any health conditions you have.  · Your overall physical ability.  Talk to your health care provider about how much exercise you need and what types of activities are safe for you.  What actions can I take to lose weight?  Nutrition    · Make changes to your diet as told by your health care provider or diet and nutrition specialist (dietitian). This may include:  ? Eating fewer calories.  ? Eating more protein.  ? Eating less unhealthy fats.  ? Eating a  diet that includes fresh fruits and vegetables, whole grains, low-fat dairy products, and lean protein.  ? Avoiding foods with added fat, salt, and sugar.  · Drink plenty of water while you exercise to prevent dehydration or heat stroke.  Activity  · Choose an activity that you enjoy and set realistic goals. Your health care provider can help you make an exercise plan that works for you.  · Exercise at a moderate or vigorous intensity most days of the week.  ? The intensity of exercise may vary from person to person. You can tell how intense a workout is for you by paying attention to your breathing and heartbeat. Most people will notice their breathing and heartbeat get faster with more intense exercise.  · Do resistance training twice each week, such as:  ? Push-ups.  ? Sit-ups.  ? Lifting weights.  ? Using resistance bands.  · Getting short amounts of exercise can be just as helpful as long structured periods of exercise. If you have trouble finding time to exercise, try to include exercise in your daily routine.  ? Get up, stretch, and walk around every 30 minutes throughout the day.  ? Go for a walk during your lunch break.  ? Park your car farther away from your destination.  ? If you take public transportation, get off one stop early and walk the rest of the way.  ? Make phone calls while standing up and walking around.  ? Take the stairs instead of elevators or escalators.  · Wear comfortable clothes and shoes with good support.  · Do not exercise so much that you hurt yourself, feel dizzy, or get very short of breath.  Where to find more information  · [place] Department of Health and Human Services: www.hhs.gov  · Centers for Disease Control and Prevention (CDC): www.cdc.gov  Contact a health care provider:  · Before starting a new exercise program.  · If you have questions or concerns about your weight.  · If you have a medical problem that keeps you from exercising.  Get help right away if you have any of  the following while exercising:  · Injury.  · Dizziness.  · Difficulty breathing or shortness of breath that does not go away when you stop exercising.  · Chest pain.  · Rapid heartbeat.  Summary  · Being overweight increases your risk of heart disease, stroke, diabetes, high blood pressure, and several types of cancer.  · Losing weight happens when you burn more calories than you eat.  · Reducing the amount of calories you eat in addition to getting regular moderate or vigorous exercise each week helps you lose weight.  This information is not intended to replace advice given to you by your health care provider. Make sure you discuss any questions you have with your health care provider.  Document Released: 01/20/2012 Document Revised: 12/31/2018 Document Reviewed: 12/31/2018  [employer] Patient Education © 2020 [employer]      Exercising to Stay Healthy  To become healthy and stay healthy, it is recommended that you do moderate-intensity and vigorous-intensity exercise. You can tell that you are exercising at a moderate intensity if your heart starts beating faster and you start breathing faster but can still hold a conversation. You can tell that you are exercising at a vigorous intensity if you are breathing much harder and faster and cannot hold a conversation while exercising.  Exercising regularly is important. It has many health benefits, such as:  · Improving overall fitness, flexibility, and endurance.  · Increasing bone density.  · Helping with weight control.  · Decreasing body fat.  · Increasing muscle strength.  · Reducing stress and tension.  · Improving overall health.  How often should I exercise?  Choose an activity that you enjoy, and set realistic goals. Your health care provider can help you make an activity plan that works for you.  Exercise regularly as told by your health care provider. This may include:  · Doing strength training two times a week, such as:  ? Lifting weights.  ? Using  resistance bands.  ? Push-ups.  ? Sit-ups.  ? Yoga.  · Doing a certain intensity of exercise for a given amount of time. Choose from these options:  ? A total of 150 minutes of moderate-intensity exercise every week.  ? A total of 75 minutes of vigorous-intensity exercise every week.  ? A mix of moderate-intensity and vigorous-intensity exercise every week.  Children, pregnant women, people who have not exercised regularly, people who are overweight, and older adults may need to talk with a health care provider about what activities are safe to do. If you have a medical condition, be sure to talk with your health care provider before you start a new exercise program.  What are some exercise ideas?  Moderate-intensity exercise ideas include:  · Walking 1 mile (1.6 km) in about 15 minutes.  · Biking.  · Hiking.  · Golfing.  · Dancing.  · Water aerobics.  Vigorous-intensity exercise ideas include:  · Walking 4.5 miles (7.2 km) or more in about 1 hour.  · Jogging or running 5 miles (8 km) in about 1 hour.  · Biking 10 miles (16.1 km) or more in about 1 hour.  · Lap swimming.  · Roller-skating or in-line skating.  · Cross-country skiing.  · Vigorous competitive sports, such as football, basketball, and soccer.  · Jumping rope.  · Aerobic dancing.  What are some everyday activities that can help me to get exercise?  · Yard work, such as:  ? Pushing a .  ? Raking and bagging leaves.  · Washing your car.  · Pushing a stroller.  · Shoveling snow.  · Gardening.  · Washing windows or floors.  How can I be more active in my day-to-day activities?  · Use stairs instead of an elevator.  · Take a walk during your lunch break.  · If you drive, park your car farther away from your work or school.  · If you take public transportation, get off one stop early and walk the rest of the way.  · Stand up or walk around during all of your indoor phone calls.  · Get up, stretch, and walk around every 30 minutes throughout the  day.  · Enjoy exercise with a friend. Support to continue exercising will help you keep a regular routine of activity.  What guidelines can I follow while exercising?  · Before you start a new exercise program, talk with your health care provider.  · Do not exercise so much that you hurt yourself, feel dizzy, or get very short of breath.  · Wear comfortable clothes and wear shoes with good support.  · Drink plenty of water while you exercise to prevent dehydration or heat stroke.  · Work out until your breathing and your heartbeat get faster.  Where to find more information  · [place] Department of Health and Human Services: www.hhs.gov  · Centers for Disease Control and Prevention (CDC): www.cdc.gov  Summary  · Exercising regularly is important. It will improve your overall fitness, flexibility, and endurance.  · Regular exercise also will improve your overall health. It can help you control your weight, reduce stress, and improve your bone density.  · Do not exercise so much that you hurt yourself, feel dizzy, or get very short of breath.  · Before you start a new exercise program, talk with your health care provider.  This information is not intended to replace advice given to you by your health care provider. Make sure you discuss any questions you have with your health care provider.  Document Released: 01/20/2012 Document Revised: 11/30/2018 Document Reviewed: 11/08/2018  [employer] Patient Education © 2020 [employer]      Fall Prevention in the Home, Adult  Falls can cause injuries and can affect people from all age groups. There are many simple things that you can do to make your home safe and to help prevent falls. Ask for help when making these changes, if needed.  What actions can I take to prevent falls?  General instructions  · Use good lighting in all rooms. Replace any light bulbs that burn out.  · Turn on lights if it is dark. Use night-lights.  · Place frequently used items in easy-to-reach places.  Lower the shelves around your home if necessary.  · Set up furniture so that there are clear paths around it. Avoid moving your furniture around.  · Remove throw rugs and other tripping hazards from the floor.  · Avoid walking on wet floors.  · Fix any uneven floor surfaces.  · Add color or contrast paint or tape to grab bars and handrails in your home. Place contrasting color strips on the first and last steps of stairways.  · When you use a stepladder, make sure that it is completely opened and that the sides are firmly locked. Have someone hold the ladder while you are using it. Do not climb a closed stepladder.  · Be aware of any and all pets.  What can I do in the bathroom?         · Keep the floor dry. Immediately clean up any water that spills onto the floor.  · Remove soap buildup in the tub or shower on a regular basis.  · Use non-skid mats or decals on the floor of the tub or shower.  · Attach bath mats securely with double-sided, non-slip rug tape.  · If you need to sit down while you are in the shower, use a plastic, non-slip stool.  · Install grab bars by the toilet and in the tub and shower. Do not use towel bars as grab bars.  What can I do in the bedroom?  · Make sure that a bedside light is easy to reach.  · Do not use oversized bedding that drapes onto the floor.  · Have a firm chair that has side arms to use for getting dressed.  What can I do in the kitchen?  · Clean up any spills right away.  · If you need to reach for something above you, use a sturdy step stool that has a grab bar.  · Keep electrical cables out of the way.  · Do not use floor polish or wax that makes floors slippery. If you must use wax, make sure that it is non-skid floor wax.  What can I do in the stairways?  · Do not leave any items on the stairs.  · Make sure that you have a light switch at the top of the stairs and the bottom of the stairs. Have them installed if you do not have them.  · Make sure that there are handrails  on both sides of the stairs. Fix handrails that are broken or loose. Make sure that handrails are as long as the stairways.  · Install non-slip stair treads on all stairs in your home.  · Avoid having throw rugs at the top or bottom of stairways, or secure the rugs with carpet tape to prevent them from moving.  · Choose a carpet design that does not hide the edge of steps on the stairway.  · Check any carpeting to make sure that it is firmly attached to the stairs. Fix any carpet that is loose or worn.  What can I do on the outside of my home?  · Use bright outdoor lighting.  · Regularly repair the edges of walkways and driveways and fix any cracks.  · Remove high doorway thresholds.  · Trim any shrubbery on the main path into your home.  · Regularly check that handrails are securely fastened and in good repair. Both sides of any steps should have handrails.  · Install guardrails along the edges of any raised decks or porches.  · Clear walkways of debris and clutter, including tools and rocks.  · Have leaves, snow, and ice cleared regularly.  · Use sand or salt on walkways during winter months.  · In the garage, clean up any spills right away, including grease or oil spills.  What other actions can I take?  · Wear closed-toe shoes that fit well and support your feet. Wear shoes that have rubber soles or low heels.  · Use mobility aids as needed, such as canes, walkers, scooters, and crutches.  · Review your medicines with your health care provider. Some medicines can cause dizziness or changes in blood pressure, which increase your risk of falling.  Talk with your health care provider about other ways that you can decrease your risk of falls. This may include working with a physical therapist or  to improve your strength, balance, and endurance.  Where to find more information  · Centers for Disease Control and PreventionYVAN: https://www.cdc.gov  · [employer] [place] [employer] Aging:  https://jh8ymyi.crystal.nih.gov  Contact a health care provider if:  · You are afraid of falling at home.  · You feel weak, drowsy, or dizzy at home.  · You fall at home.  Summary  · There are many simple things that you can do to make your home safe and to help prevent falls.  · Ways to make your home safe include removing tripping hazards and installing grab bars in the bathroom.  · Ask for help when making these changes in your home.  This information is not intended to replace advice given to you by your health care provider. Make sure you discuss any questions you have with your health care provider.  Document Released: 12/08/2003 Document Revised: 11/30/2018 Document Reviewed: 08/02/2018  Elsevier Patient Education © 2020 [employer]      Fall Prevention in the Home, Adult  Falls can cause injuries. They can happen to people of all ages. There are many things you can do to make your home safe and to help prevent falls. Ask for help when making these changes, if needed.  What actions can I take to prevent falls?  General Instructions  · Use good lighting in all rooms. Replace any light bulbs that burn out.  · Turn on the lights when you go into a dark area. Use night-lights.  · Keep items that you use often in easy-to-reach places. Lower the shelves around your home if necessary.  · Set up your furniture so you have a clear path. Avoid moving your furniture around.  · Do not have throw rugs and other things on the floor that can make you trip.  · Avoid walking on wet floors.  · If any of your floors are uneven, fix them.  · Add color or contrast paint or tape to clearly liana and help you see:  ? Any grab bars or handrails.  ? First and last steps of stairways.  ? Where the edge of each step is.  · If you use a stepladder:  ? Make sure that it is fully opened. Do not climb a closed stepladder.  ? Make sure that both sides of the stepladder are locked into place.  ? Ask someone to hold the stepladder for you while  you use it.  · If there are any pets around you, be aware of where they are.  What can I do in the bathroom?         · Keep the floor dry. Clean up any water that spills onto the floor as soon as it happens.  · Remove soap buildup in the tub or shower regularly.  · Use non-skid mats or decals on the floor of the tub or shower.  · Attach bath mats securely with double-sided, non-slip rug tape.  · If you need to sit down in the shower, use a plastic, non-slip stool.  · Install grab bars by the toilet and in the tub and shower. Do not use towel bars as grab bars.  What can I do in the bedroom?  · Make sure that you have a light by your bed that is easy to reach.  · Do not use any sheets or blankets that are too big for your bed. They should not hang down onto the floor.  · Have a firm chair that has side arms. You can use this for support while you get dressed.  What can I do in the kitchen?  · Clean up any spills right away.  · If you need to reach something above you, use a strong step stool that has a grab bar.  · Keep electrical cords out of the way.  · Do not use floor polish or wax that makes floors slippery. If you must use wax, use non-skid floor wax.  What can I do with my stairs?  · Do not leave any items on the stairs.  · Make sure that you have a light switch at the top of the stairs and the bottom of the stairs. If you do not have them, ask someone to add them for you.  · Make sure that there are handrails on both sides of the stairs, and use them. Fix handrails that are broken or loose. Make sure that handrails are as long as the stairways.  · Install non-slip stair treads on all stairs in your home.  · Avoid having throw rugs at the top or bottom of the stairs. If you do have throw rugs, attach them to the floor with carpet tape.  · Choose a carpet that does not hide the edge of the steps on the stairway.  · Check any carpeting to make sure that it is firmly attached to the stairs. Fix any carpet that is  loose or worn.  What can I do on the outside of my home?  · Use bright outdoor lighting.  · Regularly fix the edges of walkways and driveways and fix any cracks.  · Remove anything that might make you trip as you walk through a door, such as a raised step or threshold.  · Trim any bushes or trees on the path to your home.  · Regularly check to see if handrails are loose or broken. Make sure that both sides of any steps have handrails.  · Install guardrails along the edges of any raised decks and porches.  · Clear walking paths of anything that might make someone trip, such as tools or rocks.  · Have any leaves, snow, or ice cleared regularly.  · Use sand or salt on walking paths during winter.  · Clean up any spills in your garage right away. This includes grease or oil spills.  What other actions can I take?  · Wear shoes that:  ? Have a low heel. Do not wear high heels.  ? Have rubber bottoms.  ? Are comfortable and fit you well.  ? Are closed at the toe. Do not wear open-toe sandals.  · Use tools that help you move around (mobility aids) if they are needed. These include:  ? Canes.  ? Walkers.  ? Scooters.  ? Crutches.  · Review your medicines with your doctor. Some medicines can make you feel dizzy. This can increase your chance of falling.  Ask your doctor what other things you can do to help prevent falls.  Where to find more information  · Centers for Disease Control and Prevention, STEAD[place]: https://cdc.gov  · [place]: https://rq6iucq.crystal.nih.gov  Contact a doctor if:  · You are afraid of falling at home.  · You feel weak, drowsy, or dizzy at home.  · You fall at home.  Summary  · There are many simple things that you can do to make your home safe and to help prevent falls.  · Ways to make your home safe include removing tripping hazards and installing grab bars in the bathroom.  · Ask for help when making these changes in your home.  This information is not intended to replace advice given  to you by your health care provider. Make sure you discuss any questions you have with your health care provider.  Document Released: 10/14/2010 Document Revised: 04/09/2020 Document Reviewed: 08/02/2018  Elsevier Patient Education © 2020 [employer]

## 2020-11-11 LAB
ALBUMIN SERPL-MCNC: 4.1 G/DL (ref 3.5–5.2)
ALBUMIN/GLOB SERPL: 1.6 G/DL
ALP SERPL-CCNC: 61 U/L (ref 39–117)
ALT SERPL-CCNC: 22 U/L (ref 1–33)
AST SERPL-CCNC: 19 U/L (ref 1–32)
BASOPHILS # BLD AUTO: NORMAL 10*3/UL
BASOPHILS # BLD MANUAL: 0.09 10*3/MM3 (ref 0–0.2)
BASOPHILS NFR BLD MANUAL: 1.1 % (ref 0–1.5)
BILIRUB SERPL-MCNC: 0.5 MG/DL (ref 0–1.2)
BUN SERPL-MCNC: 26 MG/DL (ref 8–23)
BUN/CREAT SERPL: 33.3 (ref 7–25)
CALCIUM SERPL-MCNC: 9.5 MG/DL (ref 8.6–10.5)
CHLORIDE SERPL-SCNC: 103 MMOL/L (ref 98–107)
CO2 SERPL-SCNC: 28.6 MMOL/L (ref 22–29)
CREAT SERPL-MCNC: 0.78 MG/DL (ref 0.57–1)
DIFFERENTIAL COMMENT: NORMAL
EOSINOPHIL # BLD AUTO: NORMAL 10*3/UL
EOSINOPHIL # BLD MANUAL: 0.35 10*3/MM3 (ref 0–0.4)
EOSINOPHIL NFR BLD AUTO: NORMAL %
EOSINOPHIL NFR BLD MANUAL: 4.3 % (ref 0.3–6.2)
ERYTHROCYTE [DISTWIDTH] IN BLOOD BY AUTOMATED COUNT: 12.8 % (ref 12.3–15.4)
GLOBULIN SER CALC-MCNC: 2.5 GM/DL
GLUCOSE SERPL-MCNC: 97 MG/DL (ref 65–99)
HCT VFR BLD AUTO: 42.1 % (ref 34–46.6)
HGB BLD-MCNC: 13.6 G/DL (ref 12–15.9)
LYMPHOCYTES # BLD AUTO: NORMAL 10*3/UL
LYMPHOCYTES # BLD MANUAL: 2.54 10*3/MM3 (ref 0.7–3.1)
LYMPHOCYTES NFR BLD AUTO: NORMAL %
LYMPHOCYTES NFR BLD MANUAL: 30.9 % (ref 19.6–45.3)
MCH RBC QN AUTO: 31.1 PG (ref 26.6–33)
MCHC RBC AUTO-ENTMCNC: 32.3 G/DL (ref 31.5–35.7)
MCV RBC AUTO: 96.1 FL (ref 79–97)
MONOCYTES # BLD MANUAL: 0.61 10*3/MM3 (ref 0.1–0.9)
MONOCYTES NFR BLD AUTO: NORMAL %
MONOCYTES NFR BLD MANUAL: 7.4 % (ref 5–12)
NEUTROPHILS # BLD MANUAL: 4.64 10*3/MM3 (ref 1.7–7)
NEUTROPHILS NFR BLD AUTO: NORMAL %
NEUTROPHILS NFR BLD MANUAL: 56.4 % (ref 42.7–76)
PLATELET # BLD AUTO: 193 10*3/MM3 (ref 140–450)
PLATELET BLD QL SMEAR: NORMAL
POTASSIUM SERPL-SCNC: 4 MMOL/L (ref 3.5–5.2)
PROT SERPL-MCNC: 6.6 G/DL (ref 6–8.5)
RBC # BLD AUTO: 4.38 10*6/MM3 (ref 3.77–5.28)
RBC MORPH BLD: NORMAL
SODIUM SERPL-SCNC: 142 MMOL/L (ref 136–145)
T4 FREE SERPL-MCNC: 1.11 NG/DL (ref 0.93–1.7)
TSH SERPL DL<=0.005 MIU/L-ACNC: 2.02 UIU/ML (ref 0.27–4.2)
VIT B12 SERPL-MCNC: 324 PG/ML (ref 211–946)
WBC # BLD AUTO: 8.23 10*3/MM3 (ref 3.4–10.8)

## 2020-11-20 ENCOUNTER — TELEPHONE (OUTPATIENT)
Dept: FAMILY MEDICINE CLINIC | Facility: CLINIC | Age: 80
End: 2020-11-20

## 2020-11-20 NOTE — TELEPHONE ENCOUNTER
PATIENT CALLED AND STATED THAT HER FEET/ANKLES ARE SWOLLEN REALLY BAD.  SHE WAS ON THE INTERNET AND SHE WONDERS IF AT THE BASE OF HER SKULL SHE HAS PSORIASIS AND THE REASON HER FEET ARE SO SWOLLEN IS BECAUSE SHE HAS PSORIATIC ARTHRITIS.    PLEASE CONTACT PATIENT TO ADVISE.    CALLBACK:  [phone]

## 2020-11-20 NOTE — TELEPHONE ENCOUNTER
Patient has been given Lasix for swelling and told to avoid sodium.  She has been researching her symptoms online and wanted to know if this could be a result of psoriatic arthritis.

## 2020-12-08 ENCOUNTER — CONSULT (OUTPATIENT)
Dept: CARDIOLOGY | Facility: CLINIC | Age: 80
End: 2020-12-08

## 2020-12-08 VITALS
HEIGHT: 66 IN | OXYGEN SATURATION: 98 % | BODY MASS INDEX: 41.46 KG/M2 | HEART RATE: 79 BPM | RESPIRATION RATE: 18 BRPM | DIASTOLIC BLOOD PRESSURE: 86 MMHG | WEIGHT: 258 LBS | SYSTOLIC BLOOD PRESSURE: 126 MMHG

## 2020-12-08 DIAGNOSIS — I10 ESSENTIAL HYPERTENSION: ICD-10-CM

## 2020-12-08 DIAGNOSIS — I83.813 VARICOSE VEINS OF BILATERAL LOWER EXTREMITIES WITH PAIN: ICD-10-CM

## 2020-12-08 DIAGNOSIS — I50.32 CHRONIC DIASTOLIC (CONGESTIVE) HEART FAILURE (HCC): Primary | ICD-10-CM

## 2020-12-08 PROCEDURE — 99204 OFFICE O/P NEW MOD 45 MIN: CPT | Performed by: INTERNAL MEDICINE

## 2020-12-08 PROCEDURE — 93000 ELECTROCARDIOGRAM COMPLETE: CPT | Performed by: INTERNAL MEDICINE

## 2020-12-08 NOTE — PROGRESS NOTES
"     [facility] Cardiology OP Consult Note    Seema Petit  [id number]  2020    Referred By: Lan Hollins DO    Chief Complaint: Lower extremity edema    History of Present Illness:   Mrs. Seema Petit is a 80 y.o. female who presents to the Cardiology Clinic for evaluation of lower extremity edema.  The patient has a past medical history significant for hypertension, CLIVE, obesity with a BMI 41.6, CLIVE, and bilateral lower extremity varicose veins.  She has a reported past cardiac history reportedly significant for chronic diastolic heart failure.  She presents the cardiology clinic today to establish cardiac care and for further evaluation of lower extremity swelling.  The patient reports a several year history of fluctuating lower extremity swelling.  She reports that she previously underwent ablation for bilateral lower extremity varicose veins several years ago.  After that time, she reported significant difficulty with lower extremity edema.  She denies significant exertional dyspnea.  She does, however, report orthopnea with a \"smothering\" sensation upon lying flat.  She typically sleeps in an inclined position.  She reports her lower extremity swelling is typically worse at the end of the day, and improved upon awakening in the morning.  She denies any exertional chest pain or chest discomfort.  No significant palpitations.  She reports previously taking Lasix, which did not significantly improve her lower extremity edema or shortness of breath.  She has subsequently ran out of her medications, and has not taken a dose of Lasix for several weeks.  No other specific complaints at this time.      Past Cardiac Testin. Last Coronary Angio: None  2. Prior Stress Testing: None  3. Last Echo: Remote, report unavailable  4. Prior Holter Monitor: None    Review of Systems:   Review of Systems   Constitutional: Negative for activity change, appetite change, chills, diaphoresis, " fatigue, fever, unexpected weight gain and unexpected weight loss.   Eyes: Negative for blurred vision and double vision.   Respiratory: Positive for shortness of breath. Negative for cough, chest tightness and wheezing.    Cardiovascular: Positive for leg swelling. Negative for chest pain and palpitations.   Gastrointestinal: Negative for abdominal pain, anal bleeding, blood in stool and GERD.   Endocrine: Negative for cold intolerance and heat intolerance.   Genitourinary: Negative for hematuria.   Neurological: Negative for dizziness, syncope, weakness and light-headedness.   Hematological: Does not bruise/bleed easily.   Psychiatric/Behavioral: Negative for depressed mood and stress. The patient is not nervous/anxious.        Past Medical History:   Past Medical History:   Diagnosis Date   • Angina pectoris (CMS/HCC)    • Arthritis    • CHF (congestive heart failure) (CMS/HCC)    • Cholelithiasis    • Chronic diastolic heart failure (CMS/HCC)    • GERD (gastroesophageal reflux disease)    • Gout    • Hyperlipidemia    • Infectious viral hepatitis    • Kidney disease    • RLS (restless legs syndrome)        Past Surgical History:   Past Surgical History:   Procedure Laterality Date   • EYE SURGERY     • HYSTERECTOMY     • TOTAL HIP ARTHROPLASTY Bilateral    • VARICOSE VEIN SURGERY         Family History:   Family History   Problem Relation Age of Onset   • Cancer Mother    • Kidney disease Mother    • Heart attack Father    • Stroke Father    • Cancer Sister    • Heart attack Brother    • Cancer Son        Social History:   Social History     Socioeconomic History   • Marital status:      Spouse name: Not on file   • Number of children: Not on file   • Years of education: Not on file   • Highest education level: Not on file   Tobacco Use   • Smoking status: Never Smoker   • Smokeless tobacco: Never Used   Substance and Sexual Activity   • Alcohol use: No     Frequency: Never   • Drug use: No        "      Medications:     Current Outpatient Medications:   •  aspirin (aspirin) 81 MG EC tablet, Take 1 tablet by mouth Daily., Disp: 30 tablet, Rfl:   •  L-ARGININE-500 PO, Take  by mouth., Disp: , Rfl:   •  losartan (COZAAR) 100 MG tablet, losartan 100 mg tablet  1 po qd, Disp: , Rfl:   •  pramipexole (MIRAPEX) 0.5 MG tablet, , Disp: , Rfl:   •  RESVERATROL PO, Take  by mouth., Disp: , Rfl:   •  furosemide (Lasix) 20 MG tablet, Take 1 tablet by mouth Daily., Disp: 60 tablet, Rfl: 0  •  potassium chloride 10 MEQ CR tablet, Take 1 tablet by mouth Daily., Disp: 30 tablet, Rfl: 0  •  traZODone (DESYREL) 50 MG tablet, Take 1 tablet by mouth Every Night., Disp: 30 tablet, Rfl: 3    Allergies:   Allergies   Allergen Reactions   • Quinine Derivatives Other (See Comments)     FLU LIKE SYMPTOMS       Physical Exam:  Vital Signs:   Vitals:    12/08/20 0939 12/08/20 0952   BP: 128/88 126/86   BP Location: Right arm Left arm   Patient Position: Sitting Sitting   Pulse: 79    Resp: 18    SpO2: 98%    Weight: 117 kg (258 lb)    Height: 167.6 cm (66\")        Physical Exam  Constitutional:       General: She is not in acute distress.     Appearance: She is well-developed. She is obese. She is not diaphoretic.   HENT:      Head: Normocephalic and atraumatic.   Eyes:      General: No scleral icterus.     Pupils: Pupils are equal, round, and reactive to light.   Neck:      Musculoskeletal: Neck supple. No muscular tenderness.      Trachea: No tracheal deviation.   Cardiovascular:      Rate and Rhythm: Normal rate and regular rhythm.      Heart sounds: Normal heart sounds. No murmur. No friction rub. No gallop.       Comments: Normal JVD.  Pulmonary:      Effort: Pulmonary effort is normal. No respiratory distress.      Breath sounds: Normal breath sounds. No stridor. No wheezing or rales.   Chest:      Chest wall: No tenderness.   Abdominal:      General: Bowel sounds are normal. There is no distension.      Palpations: Abdomen is " soft.      Tenderness: There is no abdominal tenderness. There is no guarding or rebound.      Comments: Obese abdomen   Musculoskeletal: Normal range of motion.      Comments: 1+ bilateral lower extremity pitting edema   Lymphadenopathy:      Cervical: No cervical adenopathy.   Skin:     General: Skin is warm and dry.      Findings: No erythema.   Neurological:      General: No focal deficit present.      Mental Status: She is alert and oriented to person, place, and time.   Psychiatric:         Mood and Affect: Mood normal.         Behavior: Behavior normal.         Results Review:   I reviewed the patient's new clinical results.  I personally viewed and interpreted the patient's EKG/Telemetry data      ECG 12 Lead    Date/Time: 12/8/2020 12:18 PM  Performed by: Carmelo Peter MD  Authorized by: Carmelo Peter MD   Comparison: not compared with previous ECG   Rhythm: sinus rhythm  Rate: normal  QRS axis: normal    Clinical impression: normal ECG            Assessment / Plan:     1. Chronic diastolic (congestive) heart failure   --Reported history of chronic diastolic heart failure, however no prior echocardiograms available for review  --Last BNP was within normal limits suggestive of noncardiac etiology for lower extremity edema such as venous insufficiency  --As the patient has now been off Lasix for several weeks and has 1+ lower extremity edema bilaterally, will repeat BMP to reevaluate for cardiac volume overload  --Will obtain baseline echocardiogram to evaluate systolic and diastolic function  --If elevated proBNP and evidence of diastolic dysfunction on echocardiogram, will then restart Lasix for diuresis  --If proBNP within normal limits and echocardiogram unremarkable, then suspect venous insufficiency likely underlying etiology for lower extremity edema and will pursue conservative measures with leg elevation, low-sodium intake, etc.  --Follow-up in 6 months, or sooner if required    2.   Essential hypertension  --Controlled with current antihypertensives      Follow Up:   Return in about 6 months (around 6/8/2021).      Thank you for allowing me to participate in the care of your patient. Please to not hesitate to contact me with additional questions or concerns.     MINA Peter MD  Interventional Cardiology   12/08/2020  09:52 EST

## 2020-12-09 ENCOUNTER — TELEPHONE (OUTPATIENT)
Dept: CARDIOLOGY | Facility: CLINIC | Age: 80
End: 2020-12-09

## 2020-12-09 LAB
BUN SERPL-MCNC: 23 MG/DL (ref 8–23)
BUN/CREAT SERPL: 29.5 (ref 7–25)
CALCIUM SERPL-MCNC: 9.5 MG/DL (ref 8.6–10.5)
CHLORIDE SERPL-SCNC: 101 MMOL/L (ref 98–107)
CO2 SERPL-SCNC: 26.1 MMOL/L (ref 22–29)
CREAT SERPL-MCNC: 0.78 MG/DL (ref 0.57–1)
GLUCOSE SERPL-MCNC: 104 MG/DL (ref 65–99)
NT-PROBNP SERPL-MCNC: 79 PG/ML (ref 0–738)
POTASSIUM SERPL-SCNC: 4.1 MMOL/L (ref 3.5–5.2)
SODIUM SERPL-SCNC: 139 MMOL/L (ref 136–145)

## 2020-12-09 NOTE — TELEPHONE ENCOUNTER
Patient notified of test results.  Patient stated that she forgot to mention that she was diagnosed with mitral valve prolase years ago.

## 2020-12-13 ENCOUNTER — RESULTS ENCOUNTER (OUTPATIENT)
Dept: CARDIOLOGY | Facility: CLINIC | Age: 80
End: 2020-12-13

## 2020-12-13 DIAGNOSIS — I50.32 CHRONIC DIASTOLIC (CONGESTIVE) HEART FAILURE (HCC): ICD-10-CM

## 2020-12-17 ENCOUNTER — OFFICE VISIT (OUTPATIENT)
Dept: FAMILY MEDICINE CLINIC | Facility: CLINIC | Age: 80
End: 2020-12-17

## 2020-12-17 VITALS
HEIGHT: 66 IN | WEIGHT: 260 LBS | BODY MASS INDEX: 41.78 KG/M2 | SYSTOLIC BLOOD PRESSURE: 120 MMHG | HEART RATE: 71 BPM | DIASTOLIC BLOOD PRESSURE: 70 MMHG | OXYGEN SATURATION: 99 % | TEMPERATURE: 97.5 F

## 2020-12-17 DIAGNOSIS — I50.32 CHRONIC DIASTOLIC (CONGESTIVE) HEART FAILURE (HCC): ICD-10-CM

## 2020-12-17 DIAGNOSIS — I10 ESSENTIAL HYPERTENSION: Primary | ICD-10-CM

## 2020-12-17 DIAGNOSIS — G25.81 RESTLESS LEGS SYNDROME: ICD-10-CM

## 2020-12-17 DIAGNOSIS — M17.12 PRIMARY OSTEOARTHRITIS OF LEFT KNEE: ICD-10-CM

## 2020-12-17 DIAGNOSIS — I83.813 VARICOSE VEINS OF BILATERAL LOWER EXTREMITIES WITH PAIN: ICD-10-CM

## 2020-12-17 DIAGNOSIS — F51.01 PRIMARY INSOMNIA: ICD-10-CM

## 2020-12-17 PROCEDURE — 99214 OFFICE O/P EST MOD 30 MIN: CPT | Performed by: FAMILY MEDICINE

## 2020-12-17 RX ORDER — POTASSIUM CHLORIDE 750 MG/1
10 TABLET, FILM COATED, EXTENDED RELEASE ORAL DAILY
Qty: 30 TABLET | Refills: 0 | Status: SHIPPED | OUTPATIENT
Start: 2020-12-17 | End: 2021-01-15 | Stop reason: SDUPTHER

## 2020-12-17 RX ORDER — DULOXETIN HYDROCHLORIDE 20 MG/1
20 CAPSULE, DELAYED RELEASE ORAL DAILY
Qty: 30 CAPSULE | Refills: 1 | Status: SHIPPED | OUTPATIENT
Start: 2020-12-17 | End: 2021-01-15 | Stop reason: SDUPTHER

## 2020-12-17 RX ORDER — GABAPENTIN 100 MG/1
100 CAPSULE ORAL 2 TIMES DAILY
Qty: 60 CAPSULE | Refills: 0 | Status: SHIPPED | OUTPATIENT
Start: 2020-12-17 | End: 2021-01-15 | Stop reason: SDUPTHER

## 2020-12-17 RX ORDER — FUROSEMIDE 20 MG/1
20 TABLET ORAL DAILY
Qty: 60 TABLET | Refills: 0 | Status: SHIPPED | OUTPATIENT
Start: 2020-12-17 | End: 2021-01-15 | Stop reason: SDUPTHER

## 2020-12-17 RX ORDER — TRAZODONE HYDROCHLORIDE 50 MG/1
50 TABLET ORAL NIGHTLY
Qty: 30 TABLET | Refills: 3 | Status: SHIPPED | OUTPATIENT
Start: 2020-12-17 | End: 2021-01-15 | Stop reason: SDUPTHER

## 2020-12-17 NOTE — PROGRESS NOTES
Established Patient        Chief Complaint:   Chief Complaint   Patient presents with   • Follow-up     discuss medications        Seema Petit is a 80 y.o. female    History of Present Illness:   Patient is here for scheduled follow-up visit concerning her chronic diastolic just of heart failure, essential hypertension, varicose veins of the lower extremities with pain, primary osteoarthritis and restless leg syndrome.  Patient reports continued discomfort to her lower extremities.  No new trauma or injuries.  Denies any new rashes.  Edema continues to be stable with utilization of Lasix.  She does need a refill.  She denies any fever, chills or night sweats.  No orthopnea.    Subjective     The following portions of the patient's history were reviewed and updated as appropriate: allergies, current medications, past family history, past medical history, past social history, past surgical history and problem list.    Allergies   Allergen Reactions   • Quinine Derivatives Other (See Comments)     FLU LIKE SYMPTOMS       Review of Systems  1. Constitutional: Negative for fever. Negative for chills, diaphoresis, fatigue and unexpected weight change.   2. HENT: No dysphagia; no changes to vision/hearing/smell/taste; no epistaxis  3. Eyes: Negative for redness and visual disturbance.   4. Respiratory: negative for shortness of breath. Negative for chest pain . Negative for cough and chest tightness.   5. Cardiovascular: Negative for chest pain and palpitations.   6. Gastrointestinal: Negative for abdominal distention, abdominal pain and blood in stool.   7. Endocrine: Negative for cold intolerance and heat intolerance.   8. Genitourinary: Negative for difficulty urinating, dysuria and frequency.   9. Musculoskeletal: Chronic arthralgias, back pain and myalgias.   10. Skin: Negative for color change, rash and wound.   11. Neurological: Negative for syncope, weakness and headaches.   12. Hematological: Negative  "for adenopathy. Does not bruise/bleed easily.   13. Psychiatric/Behavioral: Negative for confusion. The patient is not nervous/anxious.    Objective     Physical Exam   Vital Signs: /70   Pulse 71   Temp 97.5 °F (36.4 °C)   Ht 167.6 cm (66\")   Wt 118 kg (260 lb)   SpO2 99%   BMI 41.97 kg/m²     General Appearance: alert, oriented x 3, no acute distress.  Pleasant and interactive during questioning and examination.  Skin: warm and dry.  No jaundice.  HEENT: Atraumatic.  pupils round and reactive to light and accommodation, oral mucosa pink and moist.  Nares patent without epistaxis.  External auditory canals are patent tympanic membranes intact.  Neck: supple, no JVD, trachea midline.  No thyromegaly  Lungs: CTA, unlabored breathing effort.  Heart: RRR, normal S1 and S2, no S3, no rub.  Abdomen: soft, non-tender, no palpable bladder, present bowel sounds to auscultation ×4.  No guarding or rigidity.  Extremities: no clubbing, cyanosis.  Good range of motion actively and passively; crepitance to marina knees on A/P ROM, medial/lateral joint line tenderness marina knees; quad mech intact.  Symmetric muscle strength and development; post-surgical scarring to left hip from LONG.  Diffuse varicose veins noted to the lower extremities with 1+ pitting edema that extends to the proximal third of the tibias bilaterally.  Neuro: normal speech and mental status.  Cranial nerves II through XII intact.  No anosmia. DTR 2+; proprioception intact.  No focal motor/sensory deficits.    Assessment and Plan      Assessment:   Diagnoses and all orders for this visit:    1. Essential hypertension (Primary)    2. Chronic diastolic (congestive) heart failure (CMS/HCC)  -     furosemide (Lasix) 20 MG tablet; Take 1 tablet by mouth Daily.  Dispense: 60 tablet; Refill: 0  -     potassium chloride 10 MEQ CR tablet; Take 1 tablet by mouth Daily.  Dispense: 30 tablet; Refill: 0    3. Varicose veins of bilateral lower extremities with " pain  -     furosemide (Lasix) 20 MG tablet; Take 1 tablet by mouth Daily.  Dispense: 60 tablet; Refill: 0  -     potassium chloride 10 MEQ CR tablet; Take 1 tablet by mouth Daily.  Dispense: 30 tablet; Refill: 0    4. Primary insomnia  -     traZODone (DESYREL) 50 MG tablet; Take 1 tablet by mouth Every Night.  Dispense: 30 tablet; Refill: 3    5. Primary osteoarthritis of left knee    6. Restless legs syndrome  -     gabapentin (NEURONTIN) 100 MG capsule; Take 1 capsule by mouth 2 (two) times a day.  Dispense: 60 capsule; Refill: 0  -     DULoxetine (CYMBALTA) 20 MG capsule; Take 1 capsule by mouth Daily.  Dispense: 30 capsule; Refill: 1        Plan:  Clinically continues to deal with varicose veins of the lower extremities with associated pain.  Restless leg syndrome is likely associated with this.  I recommended discontinuing of Mirapex, as it is not clinically therapeutic to continue this medication at present.  I have instead recommended we try gabapentin, beginning with 100 mg twice daily dosing.  She will utilize this for 1 week, then begin trial of duloxetine additionally.  I have elected to utilize a staging approach secondary to both medications having very distinctive side effect profiles.  This will allow us to monitor each individual medication for any associated ill effects.  Titration of both medications can be made based on her clinical/therapeutic response.    Continue trazodone as needed for sleep.    No significant weight change noted, relatively stable.  Vital signs demonstrate hemodynamic stability.  Discussion Summary:    Discussed plan of care in detail with pt today; pt verb understanding and agrees.    I have reviewed and updated all copied forward information, as appropriate.  I attest to the accuracy and relevance of any unchanged information.    Follow up:  Return in about 4 weeks (around 1/14/2021) for Recheck, Med Change/New Meds.     There are no Patient Instructions on file for this  visit.    Lan Hollins,   12/17/20  15:37 EST          Please note that portions of this note may have been completed with a voice recognition program. Efforts were made to edit the dictations, but occasionally words are mistranscribed.

## 2021-01-15 ENCOUNTER — OFFICE VISIT (OUTPATIENT)
Dept: FAMILY MEDICINE CLINIC | Facility: CLINIC | Age: 81
End: 2021-01-15

## 2021-01-15 VITALS
HEIGHT: 66 IN | OXYGEN SATURATION: 97 % | TEMPERATURE: 97.1 F | BODY MASS INDEX: 41.14 KG/M2 | SYSTOLIC BLOOD PRESSURE: 132 MMHG | WEIGHT: 256 LBS | DIASTOLIC BLOOD PRESSURE: 82 MMHG | HEART RATE: 72 BPM

## 2021-01-15 DIAGNOSIS — K12.2 UVULITIS: ICD-10-CM

## 2021-01-15 DIAGNOSIS — I10 ESSENTIAL HYPERTENSION: ICD-10-CM

## 2021-01-15 DIAGNOSIS — G25.81 RESTLESS LEGS SYNDROME: Primary | ICD-10-CM

## 2021-01-15 DIAGNOSIS — I50.32 CHRONIC DIASTOLIC (CONGESTIVE) HEART FAILURE (HCC): ICD-10-CM

## 2021-01-15 DIAGNOSIS — F51.01 PRIMARY INSOMNIA: ICD-10-CM

## 2021-01-15 DIAGNOSIS — R06.09 DYSPNEA ON EXERTION: ICD-10-CM

## 2021-01-15 DIAGNOSIS — I83.813 VARICOSE VEINS OF BILATERAL LOWER EXTREMITIES WITH PAIN: ICD-10-CM

## 2021-01-15 PROCEDURE — 99214 OFFICE O/P EST MOD 30 MIN: CPT | Performed by: FAMILY MEDICINE

## 2021-01-15 PROCEDURE — 96372 THER/PROPH/DIAG INJ SC/IM: CPT | Performed by: FAMILY MEDICINE

## 2021-01-15 RX ORDER — TRAZODONE HYDROCHLORIDE 50 MG/1
50 TABLET ORAL NIGHTLY
Qty: 90 TABLET | Refills: 1 | Status: SHIPPED | OUTPATIENT
Start: 2021-01-15 | End: 2021-06-03 | Stop reason: SDUPTHER

## 2021-01-15 RX ORDER — TRAMADOL HYDROCHLORIDE 50 MG/1
50 TABLET ORAL EVERY 6 HOURS PRN
Qty: 45 TABLET | Refills: 0 | Status: SHIPPED | OUTPATIENT
Start: 2021-01-15 | End: 2021-02-01

## 2021-01-15 RX ORDER — GABAPENTIN 100 MG/1
100 CAPSULE ORAL 2 TIMES DAILY
Qty: 60 CAPSULE | Refills: 1 | Status: SHIPPED | OUTPATIENT
Start: 2021-01-15 | End: 2021-02-26 | Stop reason: SDUPTHER

## 2021-01-15 RX ORDER — LOSARTAN POTASSIUM 100 MG/1
100 TABLET ORAL DAILY
Qty: 90 TABLET | Refills: 1 | Status: SHIPPED | OUTPATIENT
Start: 2021-01-15 | End: 2021-04-28

## 2021-01-15 RX ORDER — DULOXETIN HYDROCHLORIDE 20 MG/1
20 CAPSULE, DELAYED RELEASE ORAL DAILY
Qty: 90 CAPSULE | Refills: 1 | Status: SHIPPED | OUTPATIENT
Start: 2021-01-15 | End: 2021-06-03 | Stop reason: SDUPTHER

## 2021-01-15 RX ORDER — GABAPENTIN 100 MG/1
100 CAPSULE ORAL 2 TIMES DAILY
Qty: 60 CAPSULE | Refills: 0 | Status: CANCELLED | OUTPATIENT
Start: 2021-01-15

## 2021-01-15 RX ORDER — METHYLPREDNISOLONE ACETATE 80 MG/ML
80 INJECTION, SUSPENSION INTRA-ARTICULAR; INTRALESIONAL; INTRAMUSCULAR; SOFT TISSUE ONCE
Status: COMPLETED | OUTPATIENT
Start: 2021-01-15 | End: 2021-01-15

## 2021-01-15 RX ORDER — ERYTHROMYCIN 5 MG/G
OINTMENT OPHTHALMIC NIGHTLY
COMMUNITY
End: 2021-04-26

## 2021-01-15 RX ORDER — POTASSIUM CHLORIDE 750 MG/1
10 TABLET, FILM COATED, EXTENDED RELEASE ORAL DAILY
Qty: 90 TABLET | Refills: 1 | Status: SHIPPED | OUTPATIENT
Start: 2021-01-15 | End: 2021-06-03 | Stop reason: SDUPTHER

## 2021-01-15 RX ORDER — FUROSEMIDE 20 MG/1
20 TABLET ORAL DAILY
Qty: 90 TABLET | Refills: 1 | Status: SHIPPED | OUTPATIENT
Start: 2021-01-15 | End: 2021-06-03 | Stop reason: SDUPTHER

## 2021-01-15 RX ADMIN — METHYLPREDNISOLONE ACETATE 80 MG: 80 INJECTION, SUSPENSION INTRA-ARTICULAR; INTRALESIONAL; INTRAMUSCULAR; SOFT TISSUE at 15:36

## 2021-01-15 NOTE — PROGRESS NOTES
Established Patient        Chief Complaint:   Chief Complaint   Patient presents with   • Follow-up     Med refills; also c/o swollen Uvula        Seema Petit is a 80 y.o. female    History of Present Illness:   Here today for scheduled follow-up visit of her diastolic CHF, venous insufficiency of the lower extremities, insomnia, hypertension, restless leg syndrome and problematic/persistent dyspnea on exertion.  She denies any associated chest pain or any syncope, vertigo, but does state that the dyspnea on exertion is almost exclusively associated with activity.  She denies any hemoptysis.  No epistaxis.  Denies any dysuria/hematuria.  No bright red blood or black tarry stool qualities.  She denies any fever, chills or night sweats.    Patient does admit to edema and erythema to her uvula.  She denies any trauma, no excessive hot beverage consumption.  Patient states she has had this numerous times throughout her adult life, spontaneous in onset and usually resolves within 1 to 2 days of onset.  Patient states this most recent episode occurred over the last 24 to 48 hours.  She denies any dysphagia.    Patient describes periodic worsening of her myalgia and arthralgias of the lower extremities, no falls or injuries, no new rashes.    Subjective     The following portions of the patient's history were reviewed and updated as appropriate: allergies, current medications, past family history, past medical history, past social history, past surgical history and problem list.    Allergies   Allergen Reactions   • Quinine Derivatives Other (See Comments)     FLU LIKE SYMPTOMS       Review of Systems  1. Constitutional: Negative for fever. Negative for chills, diaphoresis, fatigue and unexpected weight change.   2. HENT: No dysphagia; no changes to vision/hearing/smell/taste; no epistaxis.  Otherwise, as per above.  3. Eyes: Negative for redness and visual disturbance.   4. Respiratory: As per above. Negative  "for chest pain . Negative for cough and chest tightness.   5. Cardiovascular: Negative for chest pain and palpitations.   6. Gastrointestinal: Negative for abdominal distention, abdominal pain and blood in stool.   7. Endocrine: Negative for cold intolerance and heat intolerance.   8. Genitourinary: Negative for difficulty urinating, dysuria and frequency.   9. Musculoskeletal: Chronic arthralgias, back pain and myalgias.   10. Skin: Negative for color change, rash and wound.   11. Neurological: Negative for syncope, weakness and headaches.   12. Hematological: Negative for adenopathy. Does not bruise/bleed easily.   13. Psychiatric/Behavioral: Negative for confusion. The patient is not nervous/anxious.    Objective     Physical Exam   Vital Signs: /82   Pulse 72   Temp 97.1 °F (36.2 °C)   Ht 167.6 cm (66\")   Wt 116 kg (256 lb)   SpO2 97%   BMI 41.32 kg/m²     General Appearance: alert, oriented x 3, no acute distress.  Pleasant and interactive during questioning and examination.  Skin: warm and dry.  No jaundice.  HEENT: Atraumatic.  pupils round and reactive to light and accommodation, oral mucosa pink and moist.  Nares patent without epistaxis.  External auditory canals are patent tympanic membranes intact.  Erythematous and slightly edematous uvula, as well as erythema to the posterior pharynx.  No pustules or exudate.  Without findings of airway obstruction.  Neck: supple, no JVD, trachea midline.  No thyromegaly.  No stridor.  Lungs: CTA, unlabored breathing effort.  Heart: RRR, normal S1 and S2, no S3, no rub.  Abdomen: soft, non-tender, no palpable bladder, present bowel sounds to auscultation ×4.  No guarding or rigidity.  Extremities: no clubbing, cyanosis.  Good range of motion actively and passively; crepitance to marina knees on A/P ROM, medial/lateral joint line tenderness marina knees; quad mech intact.  Symmetric muscle strength and development; post-surgical scarring to left hip from LONG.  " Diffuse varicose veins noted to the lower extremities with 1+ pitting edema that extends to the proximal third of the tibias bilaterally.  Neuro: normal speech and mental status.  Cranial nerves II through XII intact.  No anosmia. DTR 2+; proprioception intact.  No focal motor/sensory deficits.    Assessment and Plan      Assessment:   Diagnoses and all orders for this visit:    1. Restless legs syndrome (Primary)  -     DULoxetine (CYMBALTA) 20 MG capsule; Take 1 capsule by mouth Daily.  Dispense: 90 capsule; Refill: 1  -     gabapentin (NEURONTIN) 100 MG capsule; Take 1 capsule by mouth 2 (two) times a day.  Dispense: 60 capsule; Refill: 1  -     traMADol (ULTRAM) 50 MG tablet; Take 1 tablet by mouth Every 6 (Six) Hours As Needed for Moderate Pain .  Dispense: 45 tablet; Refill: 0    2. Chronic diastolic (congestive) heart failure (CMS/HCC)  -     furosemide (Lasix) 20 MG tablet; Take 1 tablet by mouth Daily.  Dispense: 90 tablet; Refill: 1  -     potassium chloride 10 MEQ CR tablet; Take 1 tablet by mouth Daily.  Dispense: 90 tablet; Refill: 1    3. Varicose veins of bilateral lower extremities with pain  -     furosemide (Lasix) 20 MG tablet; Take 1 tablet by mouth Daily.  Dispense: 90 tablet; Refill: 1  -     potassium chloride 10 MEQ CR tablet; Take 1 tablet by mouth Daily.  Dispense: 90 tablet; Refill: 1    4. Primary insomnia  -     traZODone (DESYREL) 50 MG tablet; Take 1 tablet by mouth Every Night.  Dispense: 90 tablet; Refill: 1    5. Uvulitis  -     methylPREDNISolone acetate (DEPO-medrol) injection 80 mg    6. Essential hypertension  -     furosemide (Lasix) 20 MG tablet; Take 1 tablet by mouth Daily.  Dispense: 90 tablet; Refill: 1  -     losartan (COZAAR) 100 MG tablet; Take 1 tablet by mouth Daily.  Dispense: 90 tablet; Refill: 1  -     potassium chloride 10 MEQ CR tablet; Take 1 tablet by mouth Daily.  Dispense: 90 tablet; Refill: 1    7. Dyspnea on exertion    Other orders  -     Cancel:  gabapentin (NEURONTIN) 100 MG capsule; Take 1 capsule by mouth 2 (two) times a day.  Dispense: 60 capsule; Refill: 0        Plan:  Walking oximetry demonstrates normal oxygen saturation, appropriate heart rate fluctuation.  I do suspect patient suffers from significant cardiovascular deconditioning.  I have discussed with patient the need to utilize routine cardiovascular activity/exercise program, a graded approach to avoid overexertion until her physical conditioning is improved.  Vital signs demonstrate hemodynamic stability today.    I recommended 80 mg IM injection of Depo-Medrol today.  I am hopeful that this will lead to resolution of her uvulitis.  Should her breathing become more problematic, or sensation of dysphagia/airway compromise, she is instructed to call 911 or travel to her closest emergency room.    Refill provided for Lasix, losartan and potassium supplementation.    Continue current dose of duloxetine.  Continue gabapentin.  Refills provided for both.    I would like to see how patient does with a dose of tramadol for as needed worsening of arthralgias/myalgia associated with her restless leg syndrome.  She will only use on an as-needed basis.  Should she develop any ill effects to this medication she is notify the office immediately for potential change to her treatment regimen.  Discussion Summary:    Discussed plan of care in detail with pt today; pt verb understanding and agrees.    I spent 35 minutes caring for [place] on this date of service. This time includes time spent by me in the following activities:preparing for the visit, performing a medically appropriate examination and/or evaluation , counseling and educating the patient/family/caregiver, ordering medications, tests, or procedures, documenting information in the medical record and care coordination    I have reviewed and updated all copied forward information, as appropriate.  I attest to the accuracy and relevance of any  unchanged information.    Follow up:  Return in about 6 weeks (around 2/26/2021) for Recheck, Med Change/New Meds.     There are no Patient Instructions on file for this visit.    Lan Hollins,   01/15/21  15:38 EST          Please note that portions of this note may have been completed with a voice recognition program. Efforts were made to edit the dictations, but occasionally words are mistranscribed.

## 2021-02-01 ENCOUNTER — TELEPHONE (OUTPATIENT)
Dept: FAMILY MEDICINE CLINIC | Facility: CLINIC | Age: 81
End: 2021-02-01

## 2021-02-01 DIAGNOSIS — G25.81 RESTLESS LEGS SYNDROME: ICD-10-CM

## 2021-02-01 RX ORDER — TRAMADOL HYDROCHLORIDE 50 MG/1
TABLET ORAL
Qty: 90 TABLET | Refills: 0 | Status: SHIPPED | OUTPATIENT
Start: 2021-02-01 | End: 2021-04-05

## 2021-02-01 NOTE — TELEPHONE ENCOUNTER
Sonam from [employer] requested a call back. Sonam stated a CPAP supply order was faxed to us on 1/26/21. Sonam is following up to make sure it was received and if it will be returned.    Please call and advise. Sonam's call back [phone]

## 2021-02-26 ENCOUNTER — OFFICE VISIT (OUTPATIENT)
Dept: FAMILY MEDICINE CLINIC | Facility: CLINIC | Age: 81
End: 2021-02-26

## 2021-02-26 VITALS
SYSTOLIC BLOOD PRESSURE: 140 MMHG | DIASTOLIC BLOOD PRESSURE: 88 MMHG | HEIGHT: 66 IN | BODY MASS INDEX: 39.95 KG/M2 | WEIGHT: 248.6 LBS | OXYGEN SATURATION: 98 % | HEART RATE: 83 BPM

## 2021-02-26 DIAGNOSIS — G25.81 RESTLESS LEGS SYNDROME: ICD-10-CM

## 2021-02-26 DIAGNOSIS — I83.813 VARICOSE VEINS OF BILATERAL LOWER EXTREMITIES WITH PAIN: ICD-10-CM

## 2021-02-26 DIAGNOSIS — I50.32 CHRONIC DIASTOLIC (CONGESTIVE) HEART FAILURE (HCC): Primary | ICD-10-CM

## 2021-02-26 PROCEDURE — 99213 OFFICE O/P EST LOW 20 MIN: CPT | Performed by: FAMILY MEDICINE

## 2021-02-26 RX ORDER — GABAPENTIN 300 MG/1
300 CAPSULE ORAL 2 TIMES DAILY
Qty: 60 CAPSULE | Refills: 1 | Status: SHIPPED | OUTPATIENT
Start: 2021-02-26 | End: 2021-02-26 | Stop reason: SDUPTHER

## 2021-02-26 RX ORDER — GABAPENTIN 300 MG/1
300 CAPSULE ORAL 2 TIMES DAILY
Qty: 60 CAPSULE | Refills: 1 | Status: SHIPPED | OUTPATIENT
Start: 2021-02-26 | End: 2021-06-03 | Stop reason: SDUPTHER

## 2021-02-26 NOTE — PROGRESS NOTES
Established Patient        Chief Complaint:   Chief Complaint   Patient presents with   • Follow-up     Patient is here for 6 week follow up on restless leg syndrome        Seema Petit is a 80 y.o. female    History of Present Illness:   Here for scheduled 6-week follow-up of her chronic diastolic distal heart failure, varicose veins of the lower extremities and restless leg syndrome.  Patient has demonstrated a profound improvement to her overall discomfort level to lower extremity since starting gabapentin.  She has admitted to as needed use of Mirapex, although states she has only used it 5 times in the course of the last 6 weeks.  She has noticed significant reduction in the edematous changes of her lower extremities, less orthopnea.  She denies any chest pain or any syncope.  Denies any epistaxis or hemoptysis.  Tolerating all p.o. intake, remains well-hydrated.  She has continued to diurese well.    Subjective     The following portions of the patient's history were reviewed and updated as appropriate: allergies, current medications, past family history, past medical history, past social history, past surgical history and problem list.    Allergies   Allergen Reactions   • Quinine Derivatives Other (See Comments)     FLU LIKE SYMPTOMS       Review of Systems  1. Constitutional: Negative for fever. Negative for chills, diaphoresis, fatigue and unexpected weight change.   2. HENT: No dysphagia; no changes to vision/hearing/smell/taste; no epistaxis.  Otherwise, as per above.  3. Eyes: Negative for redness and visual disturbance.   4. Respiratory: As per above. Negative for chest pain . Negative for cough and chest tightness.   5. Cardiovascular: Negative for chest pain and palpitations.   6. Gastrointestinal: Negative for abdominal distention, abdominal pain and blood in stool.   7. Endocrine: Negative for cold intolerance and heat intolerance.   8. Genitourinary: Negative for difficulty urinating,  "dysuria and frequency.   9. Musculoskeletal: Chronic arthralgias, back pain and myalgias.   10. Skin: Negative for color change, rash and wound.   11. Neurological: Negative for syncope, weakness and headaches.   12. Hematological: Negative for adenopathy. Does not bruise/bleed easily.   13. Psychiatric/Behavioral: Negative for confusion. The patient is not nervous/anxious.    Objective     Physical Exam   Vital Signs: /88   Pulse 83   Ht 167.6 cm (66\")   Wt 113 kg (248 lb 9.6 oz)   SpO2 98%   Breastfeeding No   BMI 40.13 kg/m²     General Appearance: alert, oriented x 3, no acute distress.  Pleasant and interactive during questioning and examination.  Skin: warm and dry.  No jaundice.  HEENT: Atraumatic.  pupils round and reactive to light and accommodation, oral mucosa pink and moist.  Nares patent without epistaxis.  External auditory canals are patent tympanic membranes intact.  Erythematous and slightly edematous uvula, as well as erythema to the posterior pharynx.  No pustules or exudate.  Without findings of airway obstruction.  Neck: supple, no JVD, trachea midline.  No thyromegaly.  No stridor.  Lungs: CTA, unlabored breathing effort.  Heart: RRR, normal S1 and S2, no S3, no rub.  Abdomen: soft, non-tender, no palpable bladder, present bowel sounds to auscultation ×4.  No guarding or rigidity.  Extremities: no clubbing, cyanosis.  Good range of motion actively and passively; crepitance to marina knees on A/P ROM, medial/lateral joint line tenderness marina knees; quad mech intact.  Symmetric muscle strength and development; post-surgical scarring to left hip from LONG.  Diffuse varicose veins noted to the lower extremities with 1+ pitting edema that extends to the distal third of the tibias bilaterally.  Neuro: normal speech and mental status.  Cranial nerves II through XII intact.  No anosmia. DTR 2+; proprioception intact.  No focal motor/sensory deficits.    Assessment and Plan      Assessment: "   Diagnoses and all orders for this visit:    1. Chronic diastolic (congestive) heart failure (CMS/HCC) (Primary)    2. Restless legs syndrome  -     Discontinue: gabapentin (NEURONTIN) 300 MG capsule; Take 1 capsule by mouth 2 (two) times a day.  Dispense: 60 capsule; Refill: 1  -     gabapentin (NEURONTIN) 300 MG capsule; Take 1 capsule by mouth 2 (two) times a day.  Dispense: 60 capsule; Refill: 1    3. Varicose veins of bilateral lower extremities with pain        Plan:  BP during my exam 128/74.    Inc gabapentin to 300 mg bid dosing; plan to follow clinically.    Edema sig improved; surveillance BMP and proBNP today.    I have instructed patient to discontinue use of Mirapex.    Plan follow-up in 2 months, sooner if needed.  Discussion Summary:    Discussed plan of care in detail with pt today; pt verb understanding and agrees.    I spent 20 minutes caring for [place] on this date of service. This time includes time spent by me in the following activities:preparing for the visit, performing a medically appropriate examination and/or evaluation , counseling and educating the patient/family/caregiver, ordering medications, tests, or procedures, documenting information in the medical record and care coordination    I have reviewed and updated all copied forward information, as appropriate.  I attest to the accuracy and relevance of any unchanged information.    Follow up:  Return in about 2 months (around 4/26/2021) for Recheck, Med Change/New Meds.     There are no Patient Instructions on file for this visit.    Lan Hollins,   02/26/21  17:40 EST          Please note that portions of this note may have been completed with a voice recognition program. Efforts were made to edit the dictations, but occasionally words are mistranscribed.

## 2021-02-27 LAB
BUN SERPL-MCNC: 19 MG/DL (ref 8–23)
BUN/CREAT SERPL: 22.4 (ref 7–25)
CALCIUM SERPL-MCNC: 9.7 MG/DL (ref 8.6–10.5)
CHLORIDE SERPL-SCNC: 103 MMOL/L (ref 98–107)
CO2 SERPL-SCNC: 27.3 MMOL/L (ref 22–29)
CREAT SERPL-MCNC: 0.85 MG/DL (ref 0.57–1)
GLUCOSE SERPL-MCNC: 91 MG/DL (ref 65–99)
NT-PROBNP SERPL-MCNC: 48 PG/ML (ref 0–738)
POTASSIUM SERPL-SCNC: 4.3 MMOL/L (ref 3.5–5.2)
SODIUM SERPL-SCNC: 142 MMOL/L (ref 136–145)

## 2021-04-05 DIAGNOSIS — G25.81 RESTLESS LEGS SYNDROME: ICD-10-CM

## 2021-04-05 RX ORDER — TRAMADOL HYDROCHLORIDE 50 MG/1
TABLET ORAL
Qty: 90 TABLET | Refills: 0 | Status: SHIPPED | OUTPATIENT
Start: 2021-04-05 | End: 2021-06-03 | Stop reason: SDUPTHER

## 2021-04-26 ENCOUNTER — OFFICE VISIT (OUTPATIENT)
Dept: FAMILY MEDICINE CLINIC | Facility: CLINIC | Age: 81
End: 2021-04-26

## 2021-04-26 VITALS
BODY MASS INDEX: 38.73 KG/M2 | WEIGHT: 241 LBS | OXYGEN SATURATION: 97 % | DIASTOLIC BLOOD PRESSURE: 80 MMHG | HEART RATE: 83 BPM | HEIGHT: 66 IN | SYSTOLIC BLOOD PRESSURE: 132 MMHG

## 2021-04-26 DIAGNOSIS — E53.8 VITAMIN B12 DEFICIENCY: ICD-10-CM

## 2021-04-26 DIAGNOSIS — M17.12 PRIMARY OSTEOARTHRITIS OF LEFT KNEE: ICD-10-CM

## 2021-04-26 DIAGNOSIS — M25.562 ARTHRALGIA OF LEFT KNEE: ICD-10-CM

## 2021-04-26 DIAGNOSIS — Z63.6 CAREGIVER STRESS: Chronic | ICD-10-CM

## 2021-04-26 DIAGNOSIS — I10 ESSENTIAL HYPERTENSION: Primary | ICD-10-CM

## 2021-04-26 DIAGNOSIS — I50.32 CHRONIC DIASTOLIC (CONGESTIVE) HEART FAILURE (HCC): ICD-10-CM

## 2021-04-26 PROCEDURE — 99214 OFFICE O/P EST MOD 30 MIN: CPT | Performed by: FAMILY MEDICINE

## 2021-04-26 RX ORDER — AMOXICILLIN 500 MG/1
CAPSULE ORAL
COMMUNITY
End: 2021-06-03

## 2021-04-26 RX ORDER — LANOLIN ALCOHOL/MO/W.PET/CERES
1000 CREAM (GRAM) TOPICAL DAILY
COMMUNITY
End: 2023-02-24

## 2021-04-26 SDOH — SOCIAL STABILITY - SOCIAL INSECURITY: DEPENDENT RELATIVE NEEDING CARE AT HOME: Z63.6

## 2021-04-26 NOTE — PROGRESS NOTES
Established Patient        Chief Complaint:   Chief Complaint   Patient presents with   • Follow-up     HTN; patient dc'd Losartan 2 months ago; patient is doing well        Seema Petit is a 80 y.o. female    History of Present Illness:   Here for scheduled follow-up visit of her hypertension, chronic diastolic CHF, B12 deficiency, osteoarthritis of the left knee and caregiver stress syndrome.    Pt stopped losartan; no ill effects since d/c.    Taking B12 supplement by mouth for last week or so.    Continues to deal with the numerous health consequences and sequela of caring for her .    She maintains good urine output, significantly decreased edema to her lower extremities.  Subjective     The following portions of the patient's history were reviewed and updated as appropriate: allergies, current medications, past family history, past medical history, past social history, past surgical history and problem list.    Allergies   Allergen Reactions   • Quinine Derivatives Other (See Comments)     FLU LIKE SYMPTOMS       Review of Systems  1. Constitutional: Negative for fever. Negative for chills, diaphoresis, fatigue and unexpected weight change.   2. HENT: No dysphagia; no changes to vision/hearing/smell/taste; no epistaxis.  Otherwise, as per above.  3. Eyes: Negative for redness and visual disturbance.   4. Respiratory: As per above. Negative for chest pain . Negative for cough and chest tightness.   5. Cardiovascular: Negative for chest pain and palpitations.   6. Gastrointestinal: Negative for abdominal distention, abdominal pain and blood in stool.   7. Endocrine: Negative for cold intolerance and heat intolerance.   8. Genitourinary: Negative for difficulty urinating, dysuria and frequency.   9. Musculoskeletal: Chronic arthralgias, back pain and myalgias.   10. Skin: Negative for color change, rash and wound.   11. Neurological: Negative for syncope, weakness and headaches.  "  12. Hematological: Negative for adenopathy. Does not bruise/bleed easily.   13. Psychiatric/Behavioral: Negative for confusion. The patient is not nervous/anxious.    Objective     Physical Exam   Vital Signs: /80   Pulse 83   Ht 167.6 cm (66\")   Wt 109 kg (241 lb)   SpO2 97%   BMI 38.90 kg/m²     General Appearance: alert, oriented x 3, no acute distress.  Pleasant and interactive during questioning and examination.  Skin: warm and dry.  No jaundice.  HEENT: Atraumatic.  pupils round and reactive to light and accommodation, oral mucosa pink and moist.  Nares patent without epistaxis.  External auditory canals are patent tympanic membranes intact.  Erythematous and slightly edematous uvula, as well as erythema to the posterior pharynx.  No pustules or exudate.  Without findings of airway obstruction.  Neck: supple, no JVD, trachea midline.  No thyromegaly.  No stridor.  Lungs: CTA, unlabored breathing effort.  Heart: RRR, normal S1 and S2, no S3, no rub.  Abdomen: soft, non-tender, no palpable bladder, present bowel sounds to auscultation ×4.  No guarding or rigidity.  Extremities: no clubbing, cyanosis.  Good range of motion actively and passively; crepitance to marina knees on A/P ROM, medial/lateral joint line tenderness marina knees; quad mech intact.  Symmetric muscle strength and development; post-surgical scarring to left hip from LONG.  Diffuse varicose veins noted to the lower extremities with 1+ pitting edema that extends to the distal most third of the tibias bilaterally.  Neuro: normal speech and mental status.  Cranial nerves II through XII intact.  No anosmia. DTR 2+; proprioception intact.  No focal motor/sensory deficits.    Assessment and Plan      Assessment:   Diagnoses and all orders for this visit:    1. Essential hypertension (Primary)  -     Basic Metabolic Panel    2. Chronic diastolic (congestive) heart failure (CMS/HCC)  -     Basic Metabolic Panel    3. Caregiver stress    4. Primary " osteoarthritis of left knee    5. Arthralgia of left knee    6. Vitamin B12 deficiency        Plan:  Pleased with 7 pound wt loss since last visit.    Surveillance BMP today; will check B12 after approx 90 days of supplementation.    Discussed need for stress/anxiety reducing techniques such as prayer/meditation/breathing and counting exercises and avoidance of stress producing environments/situations; will follow clinically.    Continue low salt/sodium/caffeine intake.    Pain appears clinically well controlled today.    I have discussed the cardiac benefits of utilizing ARB therapy.  Patient elects to monitor blood pressure without it for extended period time, wants to see how she does without it.  Continue to monitor clinically.    Discussion Summary:    Discussed plan of care in detail with pt today; pt verb understanding and agrees.    I spent 30 minutes caring for [place] on this date of service. This time includes time spent by me in the following activities:preparing for the visit, performing a medically appropriate examination and/or evaluation , counseling and educating the patient/family/caregiver, ordering medications, tests, or procedures, documenting information in the medical record and care coordination    I have reviewed and updated all copied forward information, as appropriate.  I attest to the accuracy and relevance of any unchanged information.    Follow up:  No follow-ups on file.     There are no Patient Instructions on file for this visit.    Lan Hollins DO  04/26/21  14:39 EDT          Please note that portions of this note may have been completed with a voice recognition program. Efforts were made to edit the dictations, but occasionally words are mistranscribed.

## 2021-04-27 LAB
BUN SERPL-MCNC: 14 MG/DL (ref 8–23)
BUN/CREAT SERPL: 15.7 (ref 7–25)
CALCIUM SERPL-MCNC: 10.2 MG/DL (ref 8.6–10.5)
CHLORIDE SERPL-SCNC: 102 MMOL/L (ref 98–107)
CO2 SERPL-SCNC: 29.9 MMOL/L (ref 22–29)
CREAT SERPL-MCNC: 0.89 MG/DL (ref 0.57–1)
GLUCOSE SERPL-MCNC: 103 MG/DL (ref 65–99)
POTASSIUM SERPL-SCNC: 4.3 MMOL/L (ref 3.5–5.2)
SODIUM SERPL-SCNC: 141 MMOL/L (ref 136–145)

## 2021-06-03 ENCOUNTER — TELEPHONE (OUTPATIENT)
Dept: FAMILY MEDICINE CLINIC | Facility: CLINIC | Age: 81
End: 2021-06-03

## 2021-06-03 ENCOUNTER — OFFICE VISIT (OUTPATIENT)
Dept: FAMILY MEDICINE CLINIC | Facility: CLINIC | Age: 81
End: 2021-06-03

## 2021-06-03 VITALS
SYSTOLIC BLOOD PRESSURE: 130 MMHG | WEIGHT: 242 LBS | DIASTOLIC BLOOD PRESSURE: 80 MMHG | HEIGHT: 66 IN | HEART RATE: 83 BPM | BODY MASS INDEX: 38.89 KG/M2 | TEMPERATURE: 98 F | OXYGEN SATURATION: 98 %

## 2021-06-03 DIAGNOSIS — F51.01 PRIMARY INSOMNIA: ICD-10-CM

## 2021-06-03 DIAGNOSIS — M17.12 PRIMARY OSTEOARTHRITIS OF LEFT KNEE: ICD-10-CM

## 2021-06-03 DIAGNOSIS — I50.32 CHRONIC DIASTOLIC (CONGESTIVE) HEART FAILURE (HCC): ICD-10-CM

## 2021-06-03 DIAGNOSIS — I83.813 VARICOSE VEINS OF BILATERAL LOWER EXTREMITIES WITH PAIN: ICD-10-CM

## 2021-06-03 DIAGNOSIS — G25.81 RESTLESS LEGS SYNDROME: ICD-10-CM

## 2021-06-03 DIAGNOSIS — I10 ESSENTIAL HYPERTENSION: Primary | ICD-10-CM

## 2021-06-03 DIAGNOSIS — M25.562 ARTHRALGIA OF LEFT KNEE: ICD-10-CM

## 2021-06-03 PROCEDURE — 20610 DRAIN/INJ JOINT/BURSA W/O US: CPT | Performed by: FAMILY MEDICINE

## 2021-06-03 PROCEDURE — 99214 OFFICE O/P EST MOD 30 MIN: CPT | Performed by: FAMILY MEDICINE

## 2021-06-03 RX ORDER — FUROSEMIDE 20 MG/1
20 TABLET ORAL DAILY
Qty: 90 TABLET | Refills: 1 | Status: SHIPPED | OUTPATIENT
Start: 2021-06-03 | End: 2022-04-18

## 2021-06-03 RX ORDER — HYDROCODONE BITARTRATE AND ACETAMINOPHEN 5; 325 MG/1; MG/1
1 TABLET ORAL EVERY 4 HOURS PRN
Qty: 30 TABLET | Refills: 0 | Status: SHIPPED | OUTPATIENT
Start: 2021-06-03 | End: 2021-11-09

## 2021-06-03 RX ORDER — GABAPENTIN 300 MG/1
300 CAPSULE ORAL 2 TIMES DAILY
Qty: 60 CAPSULE | Refills: 1 | Status: SHIPPED | OUTPATIENT
Start: 2021-06-03 | End: 2021-11-09 | Stop reason: SDUPTHER

## 2021-06-03 RX ORDER — POTASSIUM CHLORIDE 750 MG/1
10 TABLET, FILM COATED, EXTENDED RELEASE ORAL DAILY
Qty: 90 TABLET | Refills: 1 | Status: SHIPPED | OUTPATIENT
Start: 2021-06-03 | End: 2022-06-17

## 2021-06-03 RX ORDER — TRAZODONE HYDROCHLORIDE 50 MG/1
50 TABLET ORAL NIGHTLY
Qty: 90 TABLET | Refills: 1 | Status: SHIPPED | OUTPATIENT
Start: 2021-06-03 | End: 2021-08-16 | Stop reason: ALTCHOICE

## 2021-06-03 RX ORDER — LIDOCAINE HYDROCHLORIDE 10 MG/ML
1 INJECTION, SOLUTION INFILTRATION; PERINEURAL
Status: COMPLETED | OUTPATIENT
Start: 2021-06-03 | End: 2021-06-03

## 2021-06-03 RX ORDER — TRAMADOL HYDROCHLORIDE 50 MG/1
50 TABLET ORAL EVERY 6 HOURS PRN
Qty: 90 TABLET | Refills: 2 | Status: SHIPPED | OUTPATIENT
Start: 2021-06-03 | End: 2021-06-03 | Stop reason: ALTCHOICE

## 2021-06-03 RX ORDER — DULOXETIN HYDROCHLORIDE 20 MG/1
20 CAPSULE, DELAYED RELEASE ORAL DAILY
Qty: 90 CAPSULE | Refills: 1 | Status: SHIPPED | OUTPATIENT
Start: 2021-06-03 | End: 2022-04-18

## 2021-06-03 RX ORDER — BETAMETHASONE SODIUM PHOSPHATE AND BETAMETHASONE ACETATE 3; 3 MG/ML; MG/ML
12 INJECTION, SUSPENSION INTRA-ARTICULAR; INTRALESIONAL; INTRAMUSCULAR; SOFT TISSUE
Status: COMPLETED | OUTPATIENT
Start: 2021-06-03 | End: 2021-06-03

## 2021-06-03 RX ORDER — HYDROCODONE BITARTRATE AND ACETAMINOPHEN 5; 325 MG/1; MG/1
TABLET ORAL
COMMUNITY
Start: 2021-05-29 | End: 2021-06-03 | Stop reason: SDUPTHER

## 2021-06-03 RX ADMIN — BETAMETHASONE SODIUM PHOSPHATE AND BETAMETHASONE ACETATE 12 MG: 3; 3 INJECTION, SUSPENSION INTRA-ARTICULAR; INTRALESIONAL; INTRAMUSCULAR; SOFT TISSUE at 09:45

## 2021-06-03 RX ADMIN — LIDOCAINE HYDROCHLORIDE 1 ML: 10 INJECTION, SOLUTION INFILTRATION; PERINEURAL at 09:45

## 2021-06-03 NOTE — TELEPHONE ENCOUNTER
Caller: Seema Petit    Relationship: Self    Best call back number: [phone]/ [phone]    What is the best time to reach you: ANYTIME    Who are you requesting to speak with (clinical staff, provider,  specific staff member): CLINICAL STAFF OR PROVIDER    What was the call regarding: PATIENT STATES THAT SHE WOULD LIKE TO STOP TAKING ONE OF HER PAIN MEDICATION AND START TAKING HYDROCODONE     Do you require a callback: YES    PLEASE CALL TO FURTHER DISCUSS

## 2021-06-03 NOTE — PROGRESS NOTES
Established Patient        Chief Complaint:   Chief Complaint   Patient presents with   • Knee Pain     Left knee pain        Seema Petit is a 80 y.o. female    History of Present Illness:   Here for scheduled follow-up visit of her hypertension, chronic diastolic heart failure, insomnia, restless leg syndrome.    Patient continues to diurese well.  She denies any hematuria.  She denies any orthopnea.  Maintains an active lifestyle, balanced dietary intake.  She is somewhat limited due to the discomfort of her left knee, described as medial joint line in location.  She denies any single episode of trauma or injury.  It is been unresponsive to her current analgesic regimen, but only as it pertains to the left knee.  The other medications do provide some meaningful relief to differing symptoms.  Any overlying skin changes.    Reports of any fever, chills or night sweats.  Denies any BRB/BTS.  Patient underwent x-ray of the left knee which demonstrated degenerative osteoarthritic changes, most pronounced at the medial compartment.  Subjective     The following portions of the patient's history were reviewed and updated as appropriate: allergies, current medications, past family history, past medical history, past social history, past surgical history and problem list.    Allergies   Allergen Reactions   • Quinine Derivatives Other (See Comments)     FLU LIKE SYMPTOMS       Review of Systems  1. Constitutional: Negative for fever. Negative for chills, diaphoresis, fatigue and unexpected weight change.   2. HENT: No dysphagia; no changes to vision/hearing/smell/taste; no epistaxis.    3. Eyes: Negative for redness and visual disturbance.   4. Respiratory: As per above. Negative for chest pain . Negative for cough and chest tightness.   5. Cardiovascular: Negative for chest pain and palpitations.   6. Gastrointestinal: Negative for abdominal distention, abdominal pain and blood in stool.   7. Endocrine:  "Negative for cold intolerance and heat intolerance.   8. Genitourinary: Negative for difficulty urinating, dysuria and frequency.   9. Musculoskeletal: Chronic arthralgias, back pain and myalgias.  Left knee pain as per above.  10. Skin: Negative for color change, rash and wound.   11. Neurological: Negative for syncope, weakness and headaches.   12. Hematological: Negative for adenopathy. Does not bruise/bleed easily.   13. Psychiatric/Behavioral: Negative for confusion. The patient is not nervous/anxious.    Objective     Physical Exam   Vital Signs: /80   Pulse 83   Temp 98 °F (36.7 °C)   Ht 167.6 cm (66\")   Wt 110 kg (242 lb)   SpO2 98%   BMI 39.06 kg/m²     General Appearance: alert, oriented x 3, no acute distress.  Pleasant and interactive during questioning and examination.  Skin: warm and dry.  No jaundice.  HEENT: Atraumatic.  pupils round and reactive to light and accommodation, oral mucosa pink and moist.  Nares patent without epistaxis.  External auditory canals are patent tympanic membranes intact.  Mild postnasal drainage without pustules or exudate.  Neck: supple, no JVD, trachea midline.  No thyromegaly.  No stridor.  Lungs: CTA, unlabored breathing effort.  Heart: RRR, normal S1 and S2, no S3, no rub.  Abdomen: soft, non-tender, no palpable bladder, present bowel sounds to auscultation ×4.  No guarding or rigidity.  Extremities: no clubbing, cyanosis.  Good range of motion actively and passively; crepitance to marina knees on A/P ROM, medial/lateral joint line tenderness marina knees; quad mech intact.  Symmetric muscle strength and development; post-surgical scarring to left hip from LONG.  Diffuse varicose veins noted to the lower extremities with 1+ pitting edema that extends to the distal most third of the tibias bilaterally.  There is medial joint line tenderness of the left knee, no appreciable ligamentous laxity.  Ana's is positive.  Quadriceps mechanism intact bilaterally.  There " is severe crepitance on A/P ROM of the left knee.  Yasmine/Mackenzie sign negative.  Dorsiflexion/plantar flexion of bilateral feet is symmetric and normal.  No leg length discrepancies.  Neuro: normal speech and mental status.  Cranial nerves II through XII intact.  No anosmia. DTR 2+; proprioception intact.  No focal motor/sensory deficits.    Advance Care Planning   ACP discussion was held with the patient during this visit. Patient does not have an advance directive, information provided.     Arthrocentesis    Date/Time: 6/3/2021 9:45 AM  Performed by: Lan Hollins DO  Authorized by: Lan Hollins DO   Indications: joint swelling and pain   Body area: knee  Joint: left knee  Local anesthesia used: yes    Anesthesia:  Local anesthesia used: yes  Local Anesthetic: lidocaine 1% without epinephrine (ethyl chloride spray)  Anesthetic total: 1 mL    Sedation:  Patient sedated: no    Preparation: Patient was prepped and draped in the usual sterile fashion.  Needle size: 22 G  Ultrasound guidance: no  Approach: anterior  Patient tolerance: patient tolerated the procedure well with no immediate complications              Assessment and Plan      Assessment:   Diagnoses and all orders for this visit:    1. Arthralgia of left knee (Primary)  -     Arthrocentesis  -     HYDROcodone-acetaminophen (NORCO) 5-325 MG per tablet; Take 1 tablet by mouth Every 4 (Four) Hours As Needed for Moderate Pain .  Dispense: 30 tablet; Refill: 0    2. Restless legs syndrome  -     gabapentin (NEURONTIN) 300 MG capsule; Take 1 capsule by mouth 2 (two) times a day.  Dispense: 60 capsule; Refill: 1  -     DULoxetine (CYMBALTA) 20 MG capsule; Take 1 capsule by mouth Daily.  Dispense: 90 capsule; Refill: 1  -     Discontinue: traMADol (ULTRAM) 50 MG tablet; Take 1 tablet by mouth Every 6 (Six) Hours As Needed for Moderate Pain .  Dispense: 90 tablet; Refill: 2    3. Chronic diastolic (congestive) heart failure (CMS/HCC)  -     furosemide  (Lasix) 20 MG tablet; Take 1 tablet by mouth Daily.  Dispense: 90 tablet; Refill: 1  -     potassium chloride 10 MEQ CR tablet; Take 1 tablet by mouth Daily.  Dispense: 90 tablet; Refill: 1    4. Varicose veins of bilateral lower extremities with pain  -     furosemide (Lasix) 20 MG tablet; Take 1 tablet by mouth Daily.  Dispense: 90 tablet; Refill: 1  -     potassium chloride 10 MEQ CR tablet; Take 1 tablet by mouth Daily.  Dispense: 90 tablet; Refill: 1    5. Essential hypertension  -     furosemide (Lasix) 20 MG tablet; Take 1 tablet by mouth Daily.  Dispense: 90 tablet; Refill: 1  -     potassium chloride 10 MEQ CR tablet; Take 1 tablet by mouth Daily.  Dispense: 90 tablet; Refill: 1    6. Primary insomnia  -     traZODone (DESYREL) 50 MG tablet; Take 1 tablet by mouth Every Night.  Dispense: 90 tablet; Refill: 1    7. Primary osteoarthritis of left knee  -     Arthrocentesis  -     HYDROcodone-acetaminophen (NORCO) 5-325 MG per tablet; Take 1 tablet by mouth Every 4 (Four) Hours As Needed for Moderate Pain .  Dispense: 30 tablet; Refill: 0        Plan:  Patient tolerated joint injection to the left knee without complication.  I discussed ice compress therapy as well as activity modification with her in detail.  Consider viscosupplementation should this not provide any meaningful improvement, or if it is short-lived.  Otherwise, can plan to repeat this injection in 3 months if needed.    I have refilled patient's diuretic, as well as potassium supplement.  She will need surveillance labs at follow-up visit.  Continue low-sodium/salt dietary intake.  Maintain appropriate hydration status.    I have refilled patient's trazodone.  Continue sleep hygiene changes in an effort to improve her overall sleep.    Discontinue use of as needed tramadol.  Instead we will utilize Norco and follow clinically.  Should patient develop any ill effects to the medication changes notify the office immediately for potential change in  treatment regimen.    Discussion Summary:    Discussed plan of care in detail with pt today; pt verb understanding and agrees.    I spent 35 minutes caring for [place] on this date of service. This time includes time spent by me in the following activities:preparing for the visit, performing a medically appropriate examination and/or evaluation , counseling and educating the patient/family/caregiver, ordering medications, tests, or procedures, documenting information in the medical record and care coordination    I have reviewed and updated all copied forward information, as appropriate.  I attest to the accuracy and relevance of any unchanged information.    Follow up:  No follow-ups on file.     There are no Patient Instructions on file for this visit.    Lan Hollins DO  06/03/21  13:44 EDT          Please note that portions of this note may have been completed with a voice recognition program. Efforts were made to edit the dictations, but occasionally words are mistranscribed.

## 2021-06-29 ENCOUNTER — APPOINTMENT (OUTPATIENT)
Dept: GENERAL RADIOLOGY | Facility: HOSPITAL | Age: 81
End: 2021-06-29

## 2021-06-29 ENCOUNTER — HOSPITAL ENCOUNTER (EMERGENCY)
Facility: HOSPITAL | Age: 81
Discharge: HOME | End: 2021-06-29
Attending: EMERGENCY MEDICINE | Admitting: EMERGENCY MEDICINE

## 2021-06-29 VITALS
SYSTOLIC BLOOD PRESSURE: 167 MMHG | TEMPERATURE: 98 F | HEART RATE: 69 BPM | DIASTOLIC BLOOD PRESSURE: 104 MMHG | HEIGHT: 66 IN | BODY MASS INDEX: 38.89 KG/M2 | OXYGEN SATURATION: 95 % | RESPIRATION RATE: 16 BRPM | WEIGHT: 242 LBS

## 2021-06-29 DIAGNOSIS — S49.92XA INJURY OF LEFT UPPER EXTREMITY, INITIAL ENCOUNTER: Primary | ICD-10-CM

## 2021-06-29 PROCEDURE — 73030 X-RAY EXAM OF SHOULDER: CPT

## 2021-06-29 PROCEDURE — 96372 THER/PROPH/DIAG INJ SC/IM: CPT

## 2021-06-29 PROCEDURE — 25010000002 MORPHINE PER 10 MG: Performed by: EMERGENCY MEDICINE

## 2021-06-29 PROCEDURE — 73060 X-RAY EXAM OF HUMERUS: CPT

## 2021-06-29 PROCEDURE — 99283 EMERGENCY DEPT VISIT LOW MDM: CPT

## 2021-06-29 RX ORDER — MORPHINE SULFATE 4 MG/ML
4 INJECTION, SOLUTION INTRAMUSCULAR; INTRAVENOUS ONCE
Status: COMPLETED | OUTPATIENT
Start: 2021-06-29 | End: 2021-06-29

## 2021-06-29 RX ADMIN — MORPHINE SULFATE 4 MG: 4 INJECTION, SOLUTION INTRAMUSCULAR; INTRAVENOUS at 15:40

## 2021-06-30 ENCOUNTER — TELEPHONE (OUTPATIENT)
Dept: FAMILY MEDICINE CLINIC | Facility: CLINIC | Age: 81
End: 2021-06-30

## 2021-06-30 NOTE — TELEPHONE ENCOUNTER
PATIENT WAS IN THE [facility] ER YESTERDAY AND SHE IS REQUESTING HER PCP CALL AND REQUEST THE RESULTS OF HER XRAYS.    CONTACT: [phone]

## 2021-07-02 NOTE — TELEPHONE ENCOUNTER
Patient has been notified.    Patient states that she is having a lot of pain. She has been taking her gabapentin, but that she may take her pain medication today to see if that helps.      Patient states that she can move her arm, but she can not lift it.

## 2021-07-13 ENCOUNTER — OFFICE VISIT (OUTPATIENT)
Dept: CARDIOLOGY | Facility: CLINIC | Age: 81
End: 2021-07-13

## 2021-07-13 VITALS
BODY MASS INDEX: 37.93 KG/M2 | DIASTOLIC BLOOD PRESSURE: 82 MMHG | HEIGHT: 66 IN | OXYGEN SATURATION: 95 % | HEART RATE: 83 BPM | SYSTOLIC BLOOD PRESSURE: 136 MMHG | WEIGHT: 236 LBS

## 2021-07-13 DIAGNOSIS — I10 ESSENTIAL HYPERTENSION: ICD-10-CM

## 2021-07-13 DIAGNOSIS — R60.0 LOWER EXTREMITY EDEMA: Primary | ICD-10-CM

## 2021-07-13 PROCEDURE — 99214 OFFICE O/P EST MOD 30 MIN: CPT | Performed by: INTERNAL MEDICINE

## 2021-07-13 RX ORDER — BACLOFEN 10 MG/1
TABLET ORAL
COMMUNITY
Start: 2021-06-23 | End: 2021-11-09

## 2021-07-13 RX ORDER — TRAMADOL HYDROCHLORIDE 50 MG/1
50 TABLET ORAL EVERY 6 HOURS PRN
COMMUNITY
Start: 2021-06-03 | End: 2021-09-27

## 2021-07-13 NOTE — PROGRESS NOTES
[facility] Cardiology Office Follow Up Note    Seema Petit  [id number]  2021    Primary Care Provider: Lan Hollins DO    Chief Complaint: Follow-up after cardiac testing    History of Present Illness:   Mrs. Seema Petit is a 80 y.o. female who presents to the Cardiology Clinic for follow-up after cardiac testing.  The patient has a past medical history significant for hypertension, CLIVE, obesity with a BMI 38 kg/m², CLIVE, and bilateral lower extremity varicose veins.    She has a reported history significant for possible chronic diastolic heart failure.  She initially presented to the cardiology clinic for evaluation of lower extremity edema.  A subsequent echocardiogram showed normal LV systolic function, mildly increased left atrial volume index, and diastolic function was consistent with age.  She has previously had several proBNP's, the last being in  which have been within normal limits.  She presents the cardiology clinic today for follow-up after obtaining her echocardiogram.  Since her last appointment, the patient reports she sustained a fall off of her porch, landing on her left side.  The fall resulted in significant left upper extremity ecchymoses.  She reports she continues to have difficulty with range of motion of her left upper extremity.  In terms of her lower extremity swelling, she reports no significant worsening of her lower extremity edema.  She currently denies significant dyspnea.  No orthopnea or PND.  She denies chest pain or exertional chest discomfort.  No palpitations.  She reports checking her systolic BP on a regular basis at home, and on review of her home BP log she has been hypertensive with a systolic BP up to the 160s.  No other specific complaints at this time.     Past Cardiac Testin. Last Coronary Angio: None  2. Prior Stress Testing: None  3. Last Echo: 2020   1.  Normal left ventricular systolic function, LVEF  65-70%   2.  Mildly increased left atrial volume index   3.  Diastolic function consistent with age  4. Prior Holter Monitor: None    Review of Systems:   Review of Systems   Constitutional: Negative for activity change, appetite change, chills, diaphoresis, fatigue, fever, unexpected weight gain and unexpected weight loss.   Eyes: Negative for blurred vision and double vision.   Respiratory: Negative for cough, chest tightness, shortness of breath and wheezing.    Cardiovascular: Positive for leg swelling. Negative for chest pain and palpitations.   Gastrointestinal: Negative for abdominal pain, anal bleeding, blood in stool and GERD.   Endocrine: Negative for cold intolerance and heat intolerance.   Genitourinary: Negative for hematuria.   Musculoskeletal:        Left upper extremity discomfort    Neurological: Negative for dizziness, syncope, weakness and light-headedness.   Hematological: Does not bruise/bleed easily.   Psychiatric/Behavioral: Negative for depressed mood and stress. The patient is not nervous/anxious.        I have reviewed and/or updated the patient's past medical, past surgical, family, social history, problem list and allergies as appropriate.     Medications:     Current Outpatient Medications:   •  aspirin (aspirin) 81 MG EC tablet, Take 1 tablet by mouth Daily., Disp: 30 tablet, Rfl:   •  baclofen (LIORESAL) 10 MG tablet, TAKE ONE TABLET BY MOUTH THREE TIMES DAILY AS NEEDED FOR BACK PAIN, Disp: , Rfl:   •  DULoxetine (CYMBALTA) 20 MG capsule, Take 1 capsule by mouth Daily., Disp: 90 capsule, Rfl: 1  •  furosemide (Lasix) 20 MG tablet, Take 1 tablet by mouth Daily., Disp: 90 tablet, Rfl: 1  •  gabapentin (NEURONTIN) 300 MG capsule, Take 1 capsule by mouth 2 (two) times a day., Disp: 60 capsule, Rfl: 1  •  HYDROcodone-acetaminophen (NORCO) 5-325 MG per tablet, Take 1 tablet by mouth Every 4 (Four) Hours As Needed for Moderate Pain ., Disp: 30 tablet, Rfl: 0  •  Nutritional Supplements  "(VITAMIN D BOOSTER PO), Take  by mouth., Disp: , Rfl:   •  potassium chloride 10 MEQ CR tablet, Take 1 tablet by mouth Daily., Disp: 90 tablet, Rfl: 1  •  traMADol (ULTRAM) 50 MG tablet, Take 50 mg by mouth Every 6 (Six) Hours As Needed. for pain, Disp: , Rfl:   •  traZODone (DESYREL) 50 MG tablet, Take 1 tablet by mouth Every Night., Disp: 90 tablet, Rfl: 1  •  vitamin B-12 (CYANOCOBALAMIN) 1000 MCG tablet, Take 1,000 mcg by mouth Daily., Disp: , Rfl:     Physical Exam:  Vital Signs:   Vitals:    07/13/21 1317   BP: 136/82   BP Location: Right arm   Patient Position: Sitting   Cuff Size: Adult   Pulse: 83   SpO2: 95%   Weight: 107 kg (236 lb)   Height: 167.6 cm (66\")       Physical Exam  Constitutional:       General: She is not in acute distress.     Appearance: She is well-developed. She is obese. She is not diaphoretic.   HENT:      Head: Normocephalic and atraumatic.   Eyes:      General: No scleral icterus.     Pupils: Pupils are equal, round, and reactive to light.   Neck:      Trachea: No tracheal deviation.   Cardiovascular:      Rate and Rhythm: Normal rate and regular rhythm.      Heart sounds: Normal heart sounds. No murmur heard.   No friction rub. No gallop.       Comments: Normal JVD.  Pulmonary:      Effort: Pulmonary effort is normal. No respiratory distress.      Breath sounds: Normal breath sounds. No stridor. No wheezing or rales.   Chest:      Chest wall: No tenderness.   Abdominal:      General: Bowel sounds are normal. There is no distension.      Palpations: Abdomen is soft.      Tenderness: There is no abdominal tenderness. There is no guarding or rebound.   Musculoskeletal:         General: Normal range of motion.      Cervical back: Neck supple. No tenderness.      Comments: Limited range of motion of the left shoulder due to discomfort   Lymphadenopathy:      Cervical: No cervical adenopathy.   Skin:     General: Skin is warm and dry.      Findings: No erythema.      Comments: Left upper " extremity ecchymoses   Neurological:      General: No focal deficit present.      Mental Status: She is alert and oriented to person, place, and time.   Psychiatric:         Mood and Affect: Mood normal.         Behavior: Behavior normal.         Results Review:   I reviewed the patient's new clinical results.      Assessment / Plan:     1.  Lower extremity edema   --Initially presented for evaluation of possible chronic diastolic heart failure as etiology for mild lower extremity edema  --Echocardiogram shows normal LV systolic function, no significant valvular abnormalities, diastolic function consistent with age  --Several prior proBNP's are within normal limits, making cardiac volume overload less likely  --Given unremarkable echocardiogram and proBNP within normal limits, suspect lower extremity edema likely secondary to chronic venous insufficiency  --Discussed conservative measures with leg elevation, low-sodium intake, etc.  --Continue low-dose Lasix as her edema currently appears to be well controlled  --Will follow on a as needed basis     2.  Essential hypertension  --Hypertensive on home BP checks with systolic BP up to 160 mmHg  --Advised to discuss BP management with her primary care physician upon follow-up      Follow Up:   Return if symptoms worsen or fail to improve.      Thank you for allowing me to participate in the care of your patient. Please to not hesitate to contact me with additional questions or concerns.     MINA Peter MD  Interventional Cardiology   07/13/2021  13:14 EDT

## 2021-07-26 ENCOUNTER — OFFICE VISIT (OUTPATIENT)
Dept: FAMILY MEDICINE CLINIC | Facility: CLINIC | Age: 81
End: 2021-07-26

## 2021-07-26 VITALS
HEART RATE: 76 BPM | DIASTOLIC BLOOD PRESSURE: 70 MMHG | WEIGHT: 232 LBS | SYSTOLIC BLOOD PRESSURE: 130 MMHG | HEIGHT: 66 IN | BODY MASS INDEX: 37.28 KG/M2 | OXYGEN SATURATION: 99 % | TEMPERATURE: 97.1 F

## 2021-07-26 DIAGNOSIS — M25.562 ARTHRALGIA OF LEFT KNEE: ICD-10-CM

## 2021-07-26 DIAGNOSIS — M17.12 PRIMARY OSTEOARTHRITIS OF LEFT KNEE: ICD-10-CM

## 2021-07-26 DIAGNOSIS — S46.012S TRAUMATIC ROTATOR CUFF TEAR, LEFT, SEQUELA: ICD-10-CM

## 2021-07-26 DIAGNOSIS — M25.512 ARTHRALGIA OF LEFT SHOULDER REGION: ICD-10-CM

## 2021-07-26 DIAGNOSIS — I10 ESSENTIAL HYPERTENSION: Primary | ICD-10-CM

## 2021-07-26 PROCEDURE — 99214 OFFICE O/P EST MOD 30 MIN: CPT | Performed by: FAMILY MEDICINE

## 2021-07-26 NOTE — PROGRESS NOTES
Established Patient        Chief Complaint:   Chief Complaint   Patient presents with   • Follow-up     Fluctuating BP; BLE Pain s/p fall 1 month  ago        Seema Petit is a 80 y.o. female    History of Present Illness:   Here for evaluation of known hypertension, osteoarthritis of her left knee and new injury to her left shoulder.    R arm dominant female; fall, mechanical/accidental, approx 1 month ago; resultant pain to left shoulder; no sig improvement over that time; very limited in ROM/movement in general, severe pain at times; unable to tolerate passive movement.  X-rays were negative in the emergency room.  She has had continued pain and limitations to ADLs.  She denies any fever, chills or night sweats.  Denies any paresthesia or  strength loss.      Subjective     The following portions of the patient's history were reviewed and updated as appropriate: allergies, current medications, past family history, past medical history, past social history, past surgical history and problem list.    Allergies   Allergen Reactions   • Quinine Derivatives Other (See Comments)     FLU LIKE SYMPTOMS       Review of Systems  1. Constitutional: Negative for fever. Negative for chills, diaphoresis, fatigue and unexpected weight change.   2. HENT: No dysphagia; no changes to vision/hearing/smell/taste; no epistaxis.    3. Eyes: Negative for redness and visual disturbance.   4. Respiratory: As per above. Negative for chest pain . Negative for cough and chest tightness.   5. Cardiovascular: Negative for chest pain and palpitations.   6. Gastrointestinal: Negative for abdominal distention, abdominal pain and blood in stool.   7. Endocrine: Negative for cold intolerance and heat intolerance.   8. Genitourinary: Negative for difficulty urinating, dysuria and frequency.   9. Musculoskeletal: Chronic arthralgias, back pain and myalgias.  Chronic left knee pain.  Left shoulder pain as per above.  10. Skin: Negative  "for color change, rash and wound.   11. Neurological: Negative for syncope, weakness and headaches.   12. Hematological: Negative for adenopathy. Does not bruise/bleed easily.   13. Psychiatric/Behavioral: Negative for confusion. The patient is not nervous/anxious.    Objective     Physical Exam   Vital Signs: /70   Pulse 76   Temp 97.1 °F (36.2 °C)   Ht 167.6 cm (66\")   Wt 105 kg (232 lb)   SpO2 99%   BMI 37.45 kg/m²     General Appearance: alert, oriented x 3, no acute distress.  Pleasant and interactive during questioning and examination.  Skin: warm and dry.  No jaundice.  HEENT: Atraumatic.  pupils round and reactive to light and accommodation, oral mucosa pink and moist.  Nares patent without epistaxis.  External auditory canals are patent tympanic membranes intact.  Mild postnasal drainage without pustules or exudate.  Neck: supple, no JVD, trachea midline.  No thyromegaly.  No stridor.  Lungs: CTA, unlabored breathing effort.  Heart: RRR, normal S1 and S2, no S3, no rub.  Abdomen: soft, non-tender, no palpable bladder, present bowel sounds to auscultation ×4.  No guarding or rigidity.  Extremities: no clubbing, cyanosis.  Good range of motion actively and passively; crepitance to marina knees on A/P ROM, medial/lateral joint line tenderness marina knees; quad mech intact.  Symmetric muscle strength and development; post-surgical scarring to left hip from LONG.  Diffuse varicose veins noted to the lower extremities with 1+ pitting edema that extends to the distal most third of the tibias bilaterally.  There is medial joint line tenderness of the left knee, no appreciable ligamentous laxity.  Ana's is positive.  Quadriceps mechanism intact bilaterally.  There is severe crepitance on A/P ROM of the left knee.  Yasmine/Mackenzie sign negative.  Dorsiflexion/plantar flexion of bilateral feet is symmetric and normal.  No leg length discrepancies.  Neer's/Hawkin's test positive to the left shoulder, unable " to abduct during empty can test, positive shrug sign.  Negative pushoff, but painful range of motion.   strength symmetric bilaterally.  Normal flexion/extension at the elbows, as well as supination/pronation of the forearms.  Cross body abduction creates significant discomfort.  Neuro: normal speech and mental status.  Cranial nerves II through XII intact.  No anosmia. DTR 2+; proprioception intact.  No focal motor/sensory deficits.      Assessment and Plan      Assessment:   Diagnoses and all orders for this visit:    1. Essential hypertension (Primary)    2. Arthralgia of left shoulder region  -     MRI Shoulder Left Without Contrast; Future  -     diclofenac (VOLTAREN) 50 MG EC tablet; Take 1 tablet by mouth 2 (Two) Times a Day As Needed (arthralgia). To be taken with food  Dispense: 60 tablet; Refill: 1    3. Traumatic rotator cuff tear, left, sequela  -     MRI Shoulder Left Without Contrast; Future    4. Primary osteoarthritis of left knee  -     diclofenac (VOLTAREN) 50 MG EC tablet; Take 1 tablet by mouth 2 (Two) Times a Day As Needed (arthralgia). To be taken with food  Dispense: 60 tablet; Refill: 1    5. Arthralgia of left knee  -     diclofenac (VOLTAREN) 50 MG EC tablet; Take 1 tablet by mouth 2 (Two) Times a Day As Needed (arthralgia). To be taken with food  Dispense: 60 tablet; Refill: 1        Plan:  MRI of left shoulder; xrays negative for acute fractures; noted degenerative changes.  Strongly suspect rotator cuff tear.  I recommended continuation of activity modification.  Ice compress to the affected shoulder.    BP @ goal; VSS.  Continue current dose of Lasix and potassium.  Continue daily aspirin.    Discussion Summary:    Discussed plan of care in detail with pt today; pt verb understanding and agrees.    I spent 30 minutes caring for [place] on this date of service. This time includes time spent by me in the following activities:preparing for the visit, reviewing tests, performing a  medically appropriate examination and/or evaluation , counseling and educating the patient/family/caregiver, ordering medications, tests, or procedures, documenting information in the medical record and care coordination    I have reviewed and updated all copied forward information, as appropriate.  I attest to the accuracy and relevance of any unchanged information.    Follow up:  Return in about 3 weeks (around 8/16/2021) for Recheck, Med Change/New Meds.     There are no Patient Instructions on file for this visit.    Lan Hollins DO  07/26/21  18:23 EDT          Please note that portions of this note may have been completed with a voice recognition program. Efforts were made to edit the dictations, but occasionally words are mistranscribed.

## 2021-07-29 DIAGNOSIS — S46.812A TRAUMATIC TEAR OF SUPRASPINATUS TENDON OF LEFT SHOULDER, INITIAL ENCOUNTER: Primary | ICD-10-CM

## 2021-08-16 ENCOUNTER — OFFICE VISIT (OUTPATIENT)
Dept: FAMILY MEDICINE CLINIC | Facility: CLINIC | Age: 81
End: 2021-08-16

## 2021-08-16 VITALS
BODY MASS INDEX: 38.25 KG/M2 | OXYGEN SATURATION: 98 % | DIASTOLIC BLOOD PRESSURE: 70 MMHG | HEIGHT: 66 IN | HEART RATE: 77 BPM | SYSTOLIC BLOOD PRESSURE: 120 MMHG | TEMPERATURE: 97.9 F | WEIGHT: 238 LBS

## 2021-08-16 DIAGNOSIS — I83.813 VARICOSE VEINS OF BILATERAL LOWER EXTREMITIES WITH PAIN: ICD-10-CM

## 2021-08-16 DIAGNOSIS — F51.01 PRIMARY INSOMNIA: Primary | ICD-10-CM

## 2021-08-16 DIAGNOSIS — S46.812D TRAUMATIC TEAR OF SUPRASPINATUS TENDON OF LEFT SHOULDER, SUBSEQUENT ENCOUNTER: ICD-10-CM

## 2021-08-16 DIAGNOSIS — G25.81 RESTLESS LEGS SYNDROME: ICD-10-CM

## 2021-08-16 DIAGNOSIS — I50.32 CHRONIC DIASTOLIC (CONGESTIVE) HEART FAILURE (HCC): ICD-10-CM

## 2021-08-16 DIAGNOSIS — I10 ESSENTIAL HYPERTENSION: ICD-10-CM

## 2021-08-16 DIAGNOSIS — Z63.6 CAREGIVER STRESS: Chronic | ICD-10-CM

## 2021-08-16 PROCEDURE — 99214 OFFICE O/P EST MOD 30 MIN: CPT | Performed by: FAMILY MEDICINE

## 2021-08-16 RX ORDER — PRAMIPEXOLE DIHYDROCHLORIDE 0.12 MG/1
TABLET ORAL
Qty: 60 TABLET | Refills: 0 | Status: SHIPPED | OUTPATIENT
Start: 2021-08-16 | End: 2021-09-07 | Stop reason: SDUPTHER

## 2021-08-16 RX ORDER — TEMAZEPAM 15 MG/1
15 CAPSULE ORAL NIGHTLY PRN
Qty: 30 CAPSULE | Refills: 0 | Status: SHIPPED | OUTPATIENT
Start: 2021-08-16 | End: 2022-02-08 | Stop reason: SDUPTHER

## 2021-08-16 SDOH — SOCIAL STABILITY - SOCIAL INSECURITY: DEPENDENT RELATIVE NEEDING CARE AT HOME: Z63.6

## 2021-08-16 NOTE — PROGRESS NOTES
"    Established Patient        Chief Complaint:   Chief Complaint   Patient presents with   • Follow-up     BLE and BUE swelling; difficulty sleeping         Seema Petit is a 80 y.o. female    History of Present Illness:   Answers for HPI/ROS submitted by the patient on 8/10/2021  Please describe your symptoms.: Shoulder accident check-up.  [place] I could not go through surgery and \"take care of things at home\".   I am doing exercises and am learning my limitations.  Have you had these symptoms before?: No  How long have you been having these symptoms?: Greater than 2 weeks  Please list any medications you are currently taking for this condition.: List of medications should be on file with [facility].  Please describe any probable cause for these symptoms. : Injury in fall on left shoulder.  What is the primary reason for your visit?: Other    Here for evaluation of continued difficulties with insomnia, as well as in follow-up of a traumatic tear of the supraspinatus tendon of her left shoulder.  Also here for follow-up of her hypertension, varicose veins of the lower extremities and associated chronic diastolic just of heart failure.  Continues to suffer from restless leg syndrome symptoms additionally.  She did do better in the past with use of pramipexole.  She is unsure as to why she is not currently on this medication.    He denies any orthopnea, syncope, vertigo or palpitations.  Continues to deal with caregiver stressors related to caring for her .  She has had significant limitations to her overall strength of the left upper extremity at the shoulder, however she has managed to pain as best able.  Denies any new falls or injuries.  She denies any fever, chills or night sweats.  Predominantly difficulty with sleep induction, although does demonstrate some problems with sleep maintenance additionally.      Subjective     The following portions of the patient's history were reviewed and updated " "as appropriate: allergies, current medications, past family history, past medical history, past social history, past surgical history and problem list.    Allergies   Allergen Reactions   • Quinine Derivatives Other (See Comments)     FLU LIKE SYMPTOMS       Review of Systems  1. Constitutional: Negative for fever. Negative for chills, diaphoresis, fatigue and unexpected weight change.   2. HENT: No dysphagia; no changes to vision/hearing/smell/taste; no epistaxis.    3. Eyes: Negative for redness and visual disturbance.   4. Respiratory: As per above. Negative for chest pain . Negative for cough and chest tightness.   5. Cardiovascular: Negative for chest pain and palpitations.   6. Gastrointestinal: Negative for abdominal distention, abdominal pain and blood in stool.   7. Endocrine: Negative for cold intolerance and heat intolerance.   8. Genitourinary: Negative for difficulty urinating, dysuria and frequency.   9. Musculoskeletal: Chronic arthralgias, back pain and myalgias.  Chronic left knee pain.  Left shoulder pain as per above.  10. Skin: Negative for color change, rash and wound.   11. Neurological: Negative for syncope, weakness and headaches.   12. Hematological: Negative for adenopathy. Does not bruise/bleed easily.   13. Psychiatric/Behavioral: Negative for confusion. The patient is not nervous/anxious.  Sleep difficulties as per above.    Objective     Physical Exam   Vital Signs: /70   Pulse 77   Temp 97.9 °F (36.6 °C)   Ht 167.6 cm (66\")   Wt 108 kg (238 lb)   SpO2 98%   BMI 38.41 kg/m²     General Appearance: alert, oriented x 3, no acute distress.  Pleasant and interactive during questioning and examination.  Skin: warm and dry.  No jaundice.  HEENT: Atraumatic.  pupils round and reactive to light and accommodation, oral mucosa pink and moist.  Nares patent without epistaxis.  External auditory canals are patent tympanic membranes intact.  Mild postnasal drainage without pustules or " exudate.  Neck: supple, no JVD, trachea midline.  No thyromegaly.  No stridor.  Lungs: CTA, unlabored breathing effort.  Heart: RRR, normal S1 and S2, no S3, no rub.  Abdomen: soft, non-tender, no palpable bladder, present bowel sounds to auscultation ×4.  No guarding or rigidity.  Extremities: no clubbing, cyanosis.  Good range of motion actively and passively; crepitance to marina knees on A/P ROM, medial/lateral joint line tenderness marina knees; quad mech intact.  Symmetric muscle strength and development; post-surgical scarring to left hip from LONG.  Diffuse varicose veins noted to the lower extremities with 1+ pitting edema that extends to the distal most third of the tibias bilaterally.  There is medial joint line tenderness of the left knee, no appreciable ligamentous laxity.  Ana's is positive.  Quadriceps mechanism intact bilaterally.  There is severe crepitance on A/P ROM of the left knee.  Yasmine/Mackenzie sign negative.  Dorsiflexion/plantar flexion of bilateral feet is symmetric and normal.  No leg length discrepancies.  Neer's/Hawkin's test positive to the left shoulder, unable to abduct during empty can test, positive shrug sign.  Negative pushoff, but painful range of motion.   strength symmetric bilaterally.  Normal flexion/extension at the elbows, as well as supination/pronation of the forearms.  Cross body abduction creates significant discomfort.  Neuro: normal speech and mental status.  Cranial nerves II through XII intact.  No anosmia. DTR 2+; proprioception intact.  No focal motor/sensory deficits.      Assessment and Plan      Assessment:   Diagnoses and all orders for this visit:    1. Primary insomnia (Primary)  -     temazepam (RESTORIL) 15 MG capsule; Take 1 capsule by mouth At Night As Needed for Sleep.  Dispense: 30 capsule; Refill: 0    2. Traumatic tear of supraspinatus tendon of left shoulder, subsequent encounter    3. Essential hypertension    4. Varicose veins of bilateral  lower extremities with pain    5. Chronic diastolic (congestive) heart failure (CMS/HCC)    6. Caregiver stress    7. Restless legs syndrome  -     pramipexole (MIRAPEX) 0.125 MG tablet; 1 tablet po qhs x first 7 nights, then inc to 2 tabs po qhs each night thereafter  Dispense: 60 tablet; Refill: 0        Plan:  Staged approach to two new meds; will start with pramipexole, planned titration of dosing.    Stop trazodone, as it is not effective in improved insomnia; will use trial of temazepam, but not starting until after 2 weeks or pramipexole.    VSS.  Appears hemodynamically stable, I recommend continued daily weight evaluation, as well as utilization of a low sodium/salt dietary intake.  Maintain appropriate hydration status.    Pt elects to not undergo surgical intervention of left shoulder; will address again at f/u appt, as she could seek having her  transition to SNF for care while she undergoes surgical repair and rehab, with hopeful transition of her  back home when pt is able to again care for him.        Discussion Summary:    Discussed plan of care in detail with pt today; pt verb understanding and agrees.    I spent 30 minutes caring for [place] on this date of service. This time includes time spent by me in the following activities:preparing for the visit, performing a medically appropriate examination and/or evaluation , counseling and educating the patient/family/caregiver, ordering medications, tests, or procedures, documenting information in the medical record and care coordination    I have reviewed and updated all copied forward information, as appropriate.  I attest to the accuracy and relevance of any unchanged information.    Follow up:  Return in about 3 weeks (around 9/6/2021) for Recheck, Med Change/New Meds.     There are no Patient Instructions on file for this visit.    Lan Hollins DO  08/16/21  17:45 EDT          Please note that portions of this note may have been  completed with a voice recognition program. Efforts were made to edit the dictations, but occasionally words are mistranscribed.

## 2021-08-17 ENCOUNTER — PRIOR AUTHORIZATION (OUTPATIENT)
Dept: FAMILY MEDICINE CLINIC | Facility: CLINIC | Age: 81
End: 2021-08-17

## 2021-08-17 NOTE — TELEPHONE ENCOUNTER
A prior auth has been started through cover my meds for temazepam.    Currently waiting on a response from the insurance.

## 2021-09-07 ENCOUNTER — OFFICE VISIT (OUTPATIENT)
Dept: FAMILY MEDICINE CLINIC | Facility: CLINIC | Age: 81
End: 2021-09-07

## 2021-09-07 VITALS
HEART RATE: 74 BPM | OXYGEN SATURATION: 97 % | DIASTOLIC BLOOD PRESSURE: 86 MMHG | TEMPERATURE: 96.9 F | SYSTOLIC BLOOD PRESSURE: 134 MMHG | BODY MASS INDEX: 38.09 KG/M2 | HEIGHT: 66 IN | WEIGHT: 237 LBS

## 2021-09-07 DIAGNOSIS — E53.8 VITAMIN B12 DEFICIENCY: ICD-10-CM

## 2021-09-07 DIAGNOSIS — S46.812D TRAUMATIC TEAR OF SUPRASPINATUS TENDON OF LEFT SHOULDER, SUBSEQUENT ENCOUNTER: Primary | ICD-10-CM

## 2021-09-07 DIAGNOSIS — I10 ESSENTIAL HYPERTENSION: ICD-10-CM

## 2021-09-07 DIAGNOSIS — G25.81 RESTLESS LEGS SYNDROME: ICD-10-CM

## 2021-09-07 DIAGNOSIS — F51.01 PRIMARY INSOMNIA: ICD-10-CM

## 2021-09-07 DIAGNOSIS — I50.32 CHRONIC DIASTOLIC (CONGESTIVE) HEART FAILURE (HCC): ICD-10-CM

## 2021-09-07 PROCEDURE — 99214 OFFICE O/P EST MOD 30 MIN: CPT | Performed by: FAMILY MEDICINE

## 2021-09-07 RX ORDER — PRAMIPEXOLE DIHYDROCHLORIDE 0.25 MG/1
0.25 TABLET ORAL NIGHTLY
Qty: 90 TABLET | Refills: 2 | Status: SHIPPED | OUTPATIENT
Start: 2021-09-07 | End: 2022-10-06

## 2021-09-07 NOTE — PROGRESS NOTES
Established Patient        Chief Complaint:   Chief Complaint   Patient presents with   • Follow-up     Shoulder is doing better        Seema Petit is a 80 y.o. female    History of Present Illness:   Answers for HPI/ROS submitted by the patient on 9/2/2021  Please describe your symptoms.: No symptoms; however, you may be glad to know that I have not, had to walk the floor all through the night with RLS., , Still using left arm and still cannot come to  with surgery.  Have you had these symptoms before?: Yes  How long have you been having these symptoms?: Greater than 2 weeks  Please list any medications you are currently taking for this condition.: Medicines on file are up-to-date.  Please describe any probable cause for these symptoms. : fell..injured  left shoulder., , RLS....Many moons.  What is the primary reason for your visit?: Other    Here in scheduled follow-up visit of her left shoulder arthralgia, known supraspinatus tear.  Denies any new falls or injuries.  She reports tolerable discomfort to the shoulder, continues to try to improve her range of motion and overall shoulder strength.    Insomnia and RLS substantially improved with use of pramipexole.    Patient denies any syncope, vertigo or palpitations.  Denies orthopnea.  No hematuria or dysuria.  Denies any BRB/BTS.      Subjective     The following portions of the patient's history were reviewed and updated as appropriate: allergies, current medications, past family history, past medical history, past social history, past surgical history and problem list.    Allergies   Allergen Reactions   • Quinine Derivatives Other (See Comments)     FLU LIKE SYMPTOMS       Review of Systems  1. Constitutional: Negative for fever. Negative for chills, diaphoresis, fatigue and unexpected weight change.   2. HENT: No dysphagia; no changes to vision/hearing/smell/taste; no epistaxis.    3. Eyes: Negative for redness and visual disturbance.  "  4. Respiratory: As per above. Negative for chest pain . Negative for cough and chest tightness.   5. Cardiovascular: Negative for chest pain and palpitations.   6. Gastrointestinal: Negative for abdominal distention, abdominal pain and blood in stool.   7. Endocrine: Negative for cold intolerance and heat intolerance.   8. Genitourinary: Negative for difficulty urinating, dysuria and frequency.   9. Musculoskeletal: Chronic arthralgias, back pain and myalgias.  Chronic left knee pain.  Left shoulder pain as per above.  10. Skin: Negative for color change, rash and wound.   11. Neurological: Negative for syncope, weakness and headaches.   12. Hematological: Negative for adenopathy. Does not bruise/bleed easily.   13. Psychiatric/Behavioral: Negative for confusion. The patient is not nervous/anxious.      Objective     Physical Exam   Vital Signs: /86   Pulse 74   Temp 96.9 °F (36.1 °C)   Ht 167.6 cm (66\")   Wt 108 kg (237 lb)   SpO2 97%   Breastfeeding No   BMI 38.25 kg/m²     General Appearance: alert, oriented x 3, no acute distress.  Pleasant and interactive during questioning and examination.  Skin: warm and dry.  No jaundice.  HEENT: Atraumatic.  pupils round and reactive to light and accommodation, oral mucosa pink and moist.  Nares patent without epistaxis.  External auditory canals are patent tympanic membranes intact.  Mild postnasal drainage without pustules or exudate.  Neck: supple, no JVD, trachea midline.  No thyromegaly.  No stridor.  Lungs: CTA, unlabored breathing effort.  Heart: RRR, normal S1 and S2, no S3, no rub.  Abdomen: soft, non-tender, no palpable bladder, present bowel sounds to auscultation ×4.  No guarding or rigidity.  Extremities: no clubbing, cyanosis.  Good range of motion actively and passively; crepitance to marina knees on A/P ROM, medial/lateral joint line tenderness marina knees; quad mech intact.  Symmetric muscle strength and development; post-surgical scarring to left " hip from LONG.  Diffuse varicose veins noted to the lower extremities with 1+ pitting edema that extends to the distal most third of the tibias bilaterally.  There is medial joint line tenderness of the left knee, no appreciable ligamentous laxity.  Ana's is positive.  Quadriceps mechanism intact bilaterally.  There is severe crepitance on A/P ROM of the left knee.  Yasmine/Mackenzie sign negative.  Dorsiflexion/plantar flexion of bilateral feet is symmetric and normal.  No leg length discrepancies.  Neer's/Hawkin's test positive to the left shoulder, unable to abduct during empty can test, positive shrug sign.  Negative pushoff, but painful range of motion.   strength symmetric bilaterally.  Normal flexion/extension at the elbows, as well as supination/pronation of the forearms.  Cross body abduction creates significant discomfort.  Neuro: normal speech and mental status.  Cranial nerves II through XII intact.  No anosmia. DTR 2+; proprioception intact.  No focal motor/sensory deficits.      Assessment and Plan      Assessment:   Diagnoses and all orders for this visit:    1. Traumatic tear of supraspinatus tendon of left shoulder, subsequent encounter (Primary)    2. Essential hypertension    3. Vitamin B12 deficiency    4. Chronic diastolic (congestive) heart failure (CMS/HCC)    5. Restless legs syndrome  -     pramipexole (MIRAPEX) 0.25 MG tablet; Take 1 tablet by mouth Every Night.  Dispense: 90 tablet; Refill: 2    6. Primary insomnia        Plan:  Patient has been erroneously marked as diabetic. Based on the available clinical information, she does not have diabetes and should therefore be excluded from diabetic health maintenance and quality measures for the remainder of the reporting period.    I have refilled patient's pramipexole today.  Dosing adjustment to account for single tablet use at night.    I have discussed the need for surgical evaluation/intervention to her left shoulder, this is her  nondominant arm however.  She continues to elect to monitor clinically, she has learned to compensate and cope at this time.  Extreme situational difficulties as related to caregiver stress, currently aiding in the care of her spouse, who has advanced dementia.  I have stressed the importance of timing with respect to her shoulder repair.  She would like to consider this over the next few weeks, and then update us as to her plans for potential intervention.    Vital signs demonstrate hemodynamic stability.  Blood pressure is at goal.    Continue current dose of temazepam.    Continue avoidance of excessive salt/sodium intake.  Daily weight evaluation.  Continue to monitor diuresis.  Demonstrates no findings of volume overload today.    Surveillance labs at follow-up visit.    Discussion Summary:    Discussed plan of care in detail with pt today; pt verb understanding and agrees.    I spent 35 minutes caring for [place] on this date of service. This time includes time spent by me in the following activities:preparing for the visit, performing a medically appropriate examination and/or evaluation , counseling and educating the patient/family/caregiver, ordering medications, tests, or procedures, documenting information in the medical record and care coordination    I have reviewed and updated all copied forward information, as appropriate.  I attest to the accuracy and relevance of any unchanged information.    Follow up:  Return in about 2 months (around 11/7/2021) for Recheck.     There are no Patient Instructions on file for this visit.    Lan Hollins DO  09/07/21  11:32 EDT          Please note that portions of this note may have been completed with a voice recognition program. Efforts were made to edit the dictations, but occasionally words are mistranscribed.

## 2021-09-27 RX ORDER — TRAMADOL HYDROCHLORIDE 50 MG/1
TABLET ORAL
Qty: 90 TABLET | Refills: 2 | Status: SHIPPED | OUTPATIENT
Start: 2021-09-27 | End: 2022-04-20

## 2021-10-02 DIAGNOSIS — M25.562 ARTHRALGIA OF LEFT KNEE: ICD-10-CM

## 2021-10-02 DIAGNOSIS — M25.512 ARTHRALGIA OF LEFT SHOULDER REGION: ICD-10-CM

## 2021-10-02 DIAGNOSIS — M17.12 PRIMARY OSTEOARTHRITIS OF LEFT KNEE: ICD-10-CM

## 2021-11-09 ENCOUNTER — OFFICE VISIT (OUTPATIENT)
Dept: FAMILY MEDICINE CLINIC | Facility: CLINIC | Age: 81
End: 2021-11-09

## 2021-11-09 VITALS
SYSTOLIC BLOOD PRESSURE: 120 MMHG | DIASTOLIC BLOOD PRESSURE: 86 MMHG | HEART RATE: 84 BPM | WEIGHT: 225.13 LBS | OXYGEN SATURATION: 98 % | HEIGHT: 66 IN | BODY MASS INDEX: 36.18 KG/M2

## 2021-11-09 DIAGNOSIS — M17.12 PRIMARY OSTEOARTHRITIS OF LEFT KNEE: ICD-10-CM

## 2021-11-09 DIAGNOSIS — G25.81 RESTLESS LEGS SYNDROME: ICD-10-CM

## 2021-11-09 DIAGNOSIS — M25.561 ARTHRALGIA OF RIGHT KNEE: Primary | ICD-10-CM

## 2021-11-09 DIAGNOSIS — M17.11 PRIMARY OSTEOARTHRITIS OF RIGHT KNEE: ICD-10-CM

## 2021-11-09 PROCEDURE — 20610 DRAIN/INJ JOINT/BURSA W/O US: CPT | Performed by: FAMILY MEDICINE

## 2021-11-09 PROCEDURE — 99213 OFFICE O/P EST LOW 20 MIN: CPT | Performed by: FAMILY MEDICINE

## 2021-11-09 RX ORDER — TRAZODONE HYDROCHLORIDE 50 MG/1
TABLET ORAL
COMMUNITY
Start: 2021-10-05 | End: 2022-05-09 | Stop reason: ALTCHOICE

## 2021-11-09 RX ORDER — GABAPENTIN 300 MG/1
300 CAPSULE ORAL 2 TIMES DAILY
Qty: 60 CAPSULE | Refills: 1 | Status: SHIPPED | OUTPATIENT
Start: 2021-11-09 | End: 2022-01-10

## 2021-11-09 RX ADMIN — BETAMETHASONE SODIUM PHOSPHATE AND BETAMETHASONE ACETATE 12 MG: 3; 3 INJECTION, SUSPENSION INTRA-ARTICULAR; INTRALESIONAL; INTRAMUSCULAR; SOFT TISSUE at 13:54

## 2021-11-09 RX ADMIN — LIDOCAINE HYDROCHLORIDE 1 ML: 10 INJECTION, SOLUTION INFILTRATION; PERINEURAL at 13:54

## 2021-11-09 NOTE — PROGRESS NOTES
Established Patient        Chief Complaint:   Chief Complaint   Patient presents with   • Follow-up     2 month follow up for knee pain.        Seema Petit is a 81 y.o. female    History of Present Illness:   Here today in scheduled follow-up visit of arthralgia of right knee, as well as restless leg syndrome.    She denies any new injuries or trauma to her right knee.  Has known primary osteoarthritis of bilateral knees.  She has underwent left knee arthrocentesis in the past with sustained noticeable improvement to her pain and functional capacity.    She describes the pain to her right knee is anterior, at times both medial and lateral joint line tenderness as well.  She denies any fever, chills or night sweats.  No overlying skin changes.    Contains an active lifestyle, balanced dietary intake.  She reports good hydration habits.      Subjective     The following portions of the patient's history were reviewed and updated as appropriate: allergies, current medications, past family history, past medical history, past social history, past surgical history and problem list.    Allergies   Allergen Reactions   • Quinine Derivatives Other (See Comments)     FLU LIKE SYMPTOMS       Review of Systems  1. Constitutional: Negative for fever. Negative for chills, diaphoresis, fatigue and unexpected weight change.   2. HENT: No dysphagia; no changes to vision/hearing/smell/taste; no epistaxis.    3. Eyes: Negative for redness and visual disturbance.   4. Respiratory: As per above. Negative for chest pain . Negative for cough and chest tightness.   5. Cardiovascular: Negative for chest pain and palpitations.   6. Gastrointestinal: Negative for abdominal distention, abdominal pain and blood in stool.   7. Endocrine: Negative for cold intolerance and heat intolerance.   8. Genitourinary: Negative for difficulty urinating, dysuria and frequency.   9. Musculoskeletal: Chronic arthralgias, back pain and myalgias.   "Chronic left knee pain.  Worsened right knee pain as per above.  10. Skin: Negative for color change, rash and wound.   11. Neurological: Negative for syncope, weakness and headaches.   12. Hematological: Negative for adenopathy. Does not bruise/bleed easily.   13. Psychiatric/Behavioral: Negative for confusion. The patient is not nervous/anxious.      Objective     Physical Exam   Vital Signs: /86   Pulse 84   Ht 167.6 cm (65.98\")   Wt 102 kg (225 lb 2 oz)   SpO2 98%   BMI 36.35 kg/m²     General Appearance: alert, oriented x 3, no acute distress.  Pleasant and interactive during questioning and examination.  Skin: warm and dry.  No jaundice.  HEENT: Atraumatic.  pupils round and reactive to light and accommodation, oral mucosa pink and moist.  Nares patent without epistaxis.  External auditory canals are patent tympanic membranes intact.  Mild postnasal drainage without pustules or exudate.  Neck: supple, no JVD, trachea midline.  No thyromegaly.  No stridor.  Lungs: CTA, unlabored breathing effort.  Heart: RRR, normal S1 and S2, no S3, no rub.  Abdomen: soft, non-tender, no palpable bladder, present bowel sounds to auscultation ×4.  No guarding or rigidity.  Extremities: no clubbing, cyanosis.  Good range of motion actively and passively; crepitance to marina knees on A/P ROM, medial/lateral joint line tenderness marina knees; quad mech intact.  Symmetric muscle strength and development; post-surgical scarring to left hip from LONG.  Diffuse varicose veins noted to the lower extremities with 1+ pitting edema that extends to the distal most third of the tibias bilaterally.  There is medial/lateral joint line tenderness of the right knee, no appreciable ligamentous laxity.  Ana's is positive.  Quadriceps mechanism intact bilaterally.  Noted crepitance on A/P ROM testing of the right knee.  Yasmine/Mackenzie sign negative.  Dorsiflexion/plantar flexion of bilateral feet is symmetric and normal.  No leg length " discrepancies.  Neer's/Hawkin's test positive to the left shoulder, unable to abduct during empty can test, positive shrug sign.  Negative pushoff, but painful range of motion.   strength symmetric bilaterally.  Normal flexion/extension at the elbows, as well as supination/pronation of the forearms.  Cross body abduction creates significant discomfort.  Neuro: normal speech and mental status.  Cranial nerves II through XII intact.  No anosmia. DTR 2+; proprioception intact.  No focal motor/sensory deficits.    Arthrocentesis    Date/Time: 11/9/2021 1:54 PM  Performed by: Lan Hollins DO  Authorized by: Lan Hollins DO   Indications: joint swelling and pain   Body area: knee  Joint: right knee  Local anesthesia used: yes    Anesthesia:  Local anesthesia used: yes  Local anesthetic: ethyl chloride spray.    Sedation:  Patient sedated: no    Preparation: Patient was prepped and draped in the usual sterile fashion.  Needle size: 22 G  Ultrasound guidance: no  Approach: anterior  Patient tolerance: patient tolerated the procedure well with no immediate complications          Assessment and Plan      Assessment:   Diagnoses and all orders for this visit:    1. Arthralgia of right knee (Primary)  -     Arthrocentesis    2. Primary osteoarthritis of right knee  -     Arthrocentesis    3. Restless legs syndrome  -     gabapentin (NEURONTIN) 300 MG capsule; Take 1 capsule by mouth 2 (Two) Times a Day.  Dispense: 60 capsule; Refill: 1    4. Primary osteoarthritis of left knee        Plan:  Patient tolerated arthrocentesis of the right knee without difficulty today.  I have discussed activity modifications, as well as ice compress therapy to the knee.    I have refilled patient's gabapentin today.    Vital signs demonstrate hemodynamic stability.    Discussion Summary:    Discussed plan of care in detail with pt today; pt verb understanding and agrees.    I spent 30 minutes caring for [place] on this date of  service; 20 minutes managing presenting symptoms, as well as developing treatment plan, and additional 10 minutes spent in performing right knee arthrocentesis. This time includes time spent by me in the following activities:preparing for the visit, performing a medically appropriate examination and/or evaluation , counseling and educating the patient/family/caregiver, ordering medications, tests, or procedures, documenting information in the medical record and care coordination    I have reviewed and updated all copied forward information, as appropriate.  I attest to the accuracy and relevance of any unchanged information.    Follow up:  No follow-ups on file.     There are no Patient Instructions on file for this visit.    Lan Hollins,   11/09/21  13:52 EST          Please note that portions of this note may have been completed with a voice recognition program. Efforts were made to edit the dictations, but occasionally words are mistranscribed.

## 2021-11-26 RX ORDER — BETAMETHASONE SODIUM PHOSPHATE AND BETAMETHASONE ACETATE 3; 3 MG/ML; MG/ML
12 INJECTION, SUSPENSION INTRA-ARTICULAR; INTRALESIONAL; INTRAMUSCULAR; SOFT TISSUE
Status: COMPLETED | OUTPATIENT
Start: 2021-11-09 | End: 2021-11-09

## 2021-11-26 RX ORDER — LIDOCAINE HYDROCHLORIDE 10 MG/ML
1 INJECTION, SOLUTION INFILTRATION; PERINEURAL
Status: COMPLETED | OUTPATIENT
Start: 2021-11-09 | End: 2021-11-09

## 2021-12-01 ENCOUNTER — OFFICE VISIT (OUTPATIENT)
Dept: FAMILY MEDICINE CLINIC | Facility: CLINIC | Age: 81
End: 2021-12-01

## 2021-12-01 VITALS
SYSTOLIC BLOOD PRESSURE: 138 MMHG | HEART RATE: 59 BPM | TEMPERATURE: 98.2 F | BODY MASS INDEX: 36.93 KG/M2 | WEIGHT: 229.8 LBS | HEIGHT: 66 IN | DIASTOLIC BLOOD PRESSURE: 84 MMHG | OXYGEN SATURATION: 95 %

## 2021-12-01 DIAGNOSIS — M25.471 ANKLE EDEMA, BILATERAL: Primary | ICD-10-CM

## 2021-12-01 DIAGNOSIS — I83.813 VARICOSE VEINS OF BILATERAL LOWER EXTREMITIES WITH PAIN: ICD-10-CM

## 2021-12-01 DIAGNOSIS — M25.472 ANKLE EDEMA, BILATERAL: Primary | ICD-10-CM

## 2021-12-01 PROCEDURE — 99213 OFFICE O/P EST LOW 20 MIN: CPT

## 2021-12-01 NOTE — PROGRESS NOTES
Acute Office Visit      Patient Name: Seema Petit  : 1940   MRN: [id number]     Chief Complaint:    Chief Complaint   Patient presents with   • Edema     x3 days; legs & ankles bilateral        History of Present Illness: Seema Petit is a 81 y.o. female who is here today for leg pain and edema.  She notes that 3 days ago she started to have bilateral leg pain.  She then noticed some bruising behind her right knee and a painful knot to her left shin.  She then acquired swelling to both ankles.  She denies any pain with walking.  Has not noticed any other changes to her skin color or the presence of rashes.  She states that she had a fall a month ago and landed on her right knee.  She was seen afterwards and had some fluid drained from the right knee.  She denies any chest pain or worsening shortness of air.  She does note that the bruising and the knot are improving but the ankles are continuing to stay swollen.  She states that they were both normal last week.  She has multiple superficial and varicose veins to bilateral legs.  Her daughter who is a nurse thinks that the bruising and the knot are most likely from the varicose veins.    Subjective     Review of System: Review of Systems   Constitutional: Negative for fatigue.   Respiratory: Negative for cough and shortness of breath.    Cardiovascular: Positive for leg swelling. Negative for chest pain.   Musculoskeletal: Positive for joint swelling. Negative for arthralgias.   Skin: Positive for color change (bruising).   Neurological: Negative for dizziness and light-headedness.      I have reviewed the ROS documented by my clinical staff, updated appropriately and I agree. MAYUR Guzman    Past Medical History:   Past Medical History:   Diagnosis Date   • Angina pectoris (HCC)    • Arthritis    • CHF (congestive heart failure) (HCC)    • Cholelithiasis    • Chronic diastolic heart failure (HCC)    • GERD (gastroesophageal reflux  disease)    • Gout    • Hyperlipidemia    • Infectious viral hepatitis    • Kidney disease    • RLS (restless legs syndrome)        Past Surgical History:   Past Surgical History:   Procedure Laterality Date   • EYE SURGERY     • HYSTERECTOMY     • TOTAL HIP ARTHROPLASTY Bilateral    • VARICOSE VEIN SURGERY         Family History:   Family History   Problem Relation Age of Onset   • Cancer Mother    • Kidney disease Mother    • Heart attack Father    • Stroke Father    • Cancer Sister    • Heart attack Brother    • Cancer Son        Social History:   Social History     Socioeconomic History   • Marital status:    Tobacco Use   • Smoking status: Never Smoker   • Smokeless tobacco: Never Used   Vaping Use   • Vaping Use: Never used   Substance and Sexual Activity   • Alcohol use: No   • Drug use: No   • Sexual activity: Defer       Medications:     Current Outpatient Medications:   •  diclofenac (VOLTAREN) 50 MG EC tablet, TAKE ONE TABLET BY MOUTH TWICE DAILY WITH FOOD AS NEEDED, Disp: 60 tablet, Rfl: 1  •  DULoxetine (CYMBALTA) 20 MG capsule, Take 1 capsule by mouth Daily., Disp: 90 capsule, Rfl: 1  •  furosemide (Lasix) 20 MG tablet, Take 1 tablet by mouth Daily., Disp: 90 tablet, Rfl: 1  •  gabapentin (NEURONTIN) 300 MG capsule, Take 1 capsule by mouth 2 (Two) Times a Day., Disp: 60 capsule, Rfl: 1  •  potassium chloride 10 MEQ CR tablet, Take 1 tablet by mouth Daily., Disp: 90 tablet, Rfl: 1  •  pramipexole (MIRAPEX) 0.25 MG tablet, Take 1 tablet by mouth Every Night., Disp: 90 tablet, Rfl: 2  •  temazepam (RESTORIL) 15 MG capsule, Take 1 capsule by mouth At Night As Needed for Sleep., Disp: 30 capsule, Rfl: 0  •  traMADol (ULTRAM) 50 MG tablet, TAKE ONE TABLET BY MOUTH EVERY 6 HOURS AS NEEDED FOR PAIN, Disp: 90 tablet, Rfl: 2  •  traZODone (DESYREL) 50 MG tablet, , Disp: , Rfl:   •  vitamin B-12 (CYANOCOBALAMIN) 1000 MCG tablet, Take 1,000 mcg by mouth Daily., Disp: , Rfl:     Allergies:   Allergies  "  Allergen Reactions   • Quinine Derivatives Other (See Comments)     FLU LIKE SYMPTOMS       Objective     Physical Exam:   Vital Signs:   Vitals:    21 1113   BP: 138/84   Pulse: 59   Temp: 98.2 °F (36.8 °C)   SpO2: 95%   Weight: 104 kg (229 lb 12.8 oz)   Height: 167.6 cm (66\")     Body mass index is 37.09 kg/m².     Physical Exam  Vitals and nursing note reviewed.   Constitutional:       General: She is not in acute distress.     Appearance: She is obese.   HENT:      Head: Normocephalic and atraumatic.   Cardiovascular:      Rate and Rhythm: Normal rate and regular rhythm.      Pulses: Normal pulses.      Heart sounds: Normal heart sounds.   Pulmonary:      Effort: Pulmonary effort is normal.      Breath sounds: Normal breath sounds.   Musculoskeletal:      Right lower le+ Pitting Edema present.      Left lower le+ Pitting Edema present.   Skin:     General: Skin is warm.      Capillary Refill: Capillary refill takes less than 2 seconds.      Findings: Ecchymosis present.          Neurological:      Mental Status: She is alert and oriented to person, place, and time. Mental status is at baseline.   Psychiatric:         Mood and Affect: Mood normal.         Behavior: Behavior normal.         Assessment / Plan      Assessment/Plan:   Diagnoses and all orders for this visit:    1. Ankle edema, bilateral (Primary)    2. Varicose veins of bilateral lower extremities with pain         1. Edema is isolated to bilateral ankles, does not extend up her tibia or down to her feet.  She currently takes Lasix 20 mg daily.  Advised the patient to increase her dose to 40 mg for the next 3 days.  She is to notify the office if she does not have any improvement.  Otherwise, after the 3 days she can return to her normal daily dose of 20 mg.  2. Blood work and ultrasound offered to patient but she is reluctant at this time.  She states that she does not like having her blood drawn and has pain when she has ultrasounds " of her legs.      Follow Up:   Return if symptoms worsen or fail to improve.    I spent approximately 20 minutes providing clinical care for this patient; including review of patient's chart and provider documentation, face to face time spent with patient in examination room (obtaining history, performing physical exam, discussing diagnosis and management options), placing orders, and completing patient documentation.     MAYUR Guzman  [facility]

## 2022-01-09 DIAGNOSIS — G25.81 RESTLESS LEGS SYNDROME: ICD-10-CM

## 2022-01-10 RX ORDER — GABAPENTIN 300 MG/1
CAPSULE ORAL
Qty: 60 CAPSULE | Refills: 1 | Status: SHIPPED | OUTPATIENT
Start: 2022-01-10 | End: 2022-03-16

## 2022-01-25 DIAGNOSIS — M25.512 ARTHRALGIA OF LEFT SHOULDER REGION: ICD-10-CM

## 2022-01-25 DIAGNOSIS — M17.12 PRIMARY OSTEOARTHRITIS OF LEFT KNEE: ICD-10-CM

## 2022-01-25 DIAGNOSIS — M25.562 ARTHRALGIA OF LEFT KNEE: ICD-10-CM

## 2022-02-08 ENCOUNTER — OFFICE VISIT (OUTPATIENT)
Dept: FAMILY MEDICINE CLINIC | Facility: CLINIC | Age: 82
End: 2022-02-08

## 2022-02-08 VITALS
TEMPERATURE: 98.4 F | HEART RATE: 73 BPM | DIASTOLIC BLOOD PRESSURE: 76 MMHG | RESPIRATION RATE: 15 BRPM | WEIGHT: 229 LBS | HEIGHT: 66 IN | SYSTOLIC BLOOD PRESSURE: 138 MMHG | OXYGEN SATURATION: 96 % | BODY MASS INDEX: 36.8 KG/M2

## 2022-02-08 DIAGNOSIS — G47.33 OBSTRUCTIVE SLEEP APNEA ON CPAP: ICD-10-CM

## 2022-02-08 DIAGNOSIS — Z00.00 MEDICARE ANNUAL WELLNESS VISIT, SUBSEQUENT: Primary | ICD-10-CM

## 2022-02-08 DIAGNOSIS — I83.813 VARICOSE VEINS OF BILATERAL LOWER EXTREMITIES WITH PAIN: ICD-10-CM

## 2022-02-08 DIAGNOSIS — F51.01 PRIMARY INSOMNIA: ICD-10-CM

## 2022-02-08 DIAGNOSIS — R73.01 IMPAIRED FASTING BLOOD SUGAR: ICD-10-CM

## 2022-02-08 DIAGNOSIS — Z99.89 OBSTRUCTIVE SLEEP APNEA ON CPAP: ICD-10-CM

## 2022-02-08 DIAGNOSIS — G25.81 RESTLESS LEGS SYNDROME: ICD-10-CM

## 2022-02-08 DIAGNOSIS — I50.32 CHRONIC DIASTOLIC (CONGESTIVE) HEART FAILURE: ICD-10-CM

## 2022-02-08 DIAGNOSIS — R42 PAROXYSMAL VERTIGO: ICD-10-CM

## 2022-02-08 DIAGNOSIS — I10 ESSENTIAL HYPERTENSION: ICD-10-CM

## 2022-02-08 PROCEDURE — 3044F HG A1C LEVEL LT 7.0%: CPT | Performed by: FAMILY MEDICINE

## 2022-02-08 PROCEDURE — 83036 HEMOGLOBIN GLYCOSYLATED A1C: CPT | Performed by: FAMILY MEDICINE

## 2022-02-08 PROCEDURE — G0439 PPPS, SUBSEQ VISIT: HCPCS | Performed by: FAMILY MEDICINE

## 2022-02-08 PROCEDURE — 1159F MED LIST DOCD IN RCRD: CPT | Performed by: FAMILY MEDICINE

## 2022-02-08 PROCEDURE — 99214 OFFICE O/P EST MOD 30 MIN: CPT | Performed by: FAMILY MEDICINE

## 2022-02-08 RX ORDER — MULTIVIT WITH MINERALS/LUTEIN
250 TABLET ORAL DAILY
COMMUNITY
End: 2023-02-24

## 2022-02-08 RX ORDER — TEMAZEPAM 30 MG/1
30 CAPSULE ORAL NIGHTLY PRN
Qty: 30 CAPSULE | Refills: 2 | Status: SHIPPED | OUTPATIENT
Start: 2022-02-08 | End: 2022-05-09 | Stop reason: ALTCHOICE

## 2022-02-08 RX ORDER — ZINC SULFATE 50(220)MG
220 CAPSULE ORAL DAILY
COMMUNITY
End: 2023-02-24

## 2022-02-08 RX ORDER — ERGOCALCIFEROL 1.25 MG/1
50000 CAPSULE ORAL WEEKLY
COMMUNITY
End: 2023-02-24

## 2022-02-08 NOTE — PROGRESS NOTES
The ABCs of the Annual Wellness Visit  Subsequent Medicare Wellness Visit    Chief Complaint   Patient presents with   • Follow-up      Subjective    History of Present Illness:  Seema Petit is a 81 y.o. female who presents for a Subsequent Medicare wellness visit; also in follow-up of her diastolic congestive heart failure, hypertension, restless leg syndrome, insomnia, obstructive sleep apnea, and varicose veins of the lower extremities.    The patient reports that she is doing well. She states that she thought she needed to go to bed and she could not fall asleep. The patient reports that at 3:00 AM, she could feel the left side of her face that is hot and then she could feel her heart pounding. She reports that she laid there and thought something is not right, so she got up and took her blood pressure. The patient reports that she took a diuretic and finally got it down. She reports that she never went to sleep that night and never went to sleep the next day. The patient reports that she was fine for 2 days without sleep. She reports that on 02/01/2021, she had a life line and they did the carotid artery and they tested that. The patient reports that on 02/08/2022 the left side of her neck was sore , but did not hurt, it had a tightness. She reports that it lasted approximately 5 minutes. The patient reports that right now she can feel pressure down in her neck, but it does not hurt. She states that one time previously she has felt like she was going to pass out. The patient reports that she has had one time where she could not walk straight.  She adds that she would catch herself and be walking and then straighten up and then start weaving again, and that lasted for almost a day. The patient reports that she does not notice much of salt or caffeine intake that day before. She denies any changes in hydration with that water. The patient states it seems every day it is something different. The patient  reports that she has lower back pain. She reports that both knees all the way down to her feet she could not walk or bend that day. She adds that she has the most pain she stands up. The patient reports that it is in both knees.      Answers for HPI/ROS submitted by the patient on 2/8/2022  Please describe your symptoms.: Lower back pain...both legs and knees and visit is regularly scheduled appointment.  There was something different that happened on the 22nd of January concerning my blood pressure...will explain in office.  Have you had these symptoms before?: Yes  How long have you been having these symptoms?: Greater than 2 weeks  Please list any medications you are currently taking for this condition.: my pain has existed for a      l...o....n....g   time and it is something that I will just have to live with because there is no cure.  Pain meds do ease the pain to where I can deal with it.  What is the primary reason for your visit?: Other    Paroxysmal vertigo; describes left neck fullness; without trauma; no syncopal episodes.    No CP/SOA/F/C; no night sweats; no falls or injuries; good UOP without hematuria.        The following portions of the patient's history were reviewed and   updated as appropriate: allergies, current medications, past family history, past medical history, past social history, past surgical history and problem list.    Compared to one year ago, the patient feels her physical   health is the same.    Compared to one year ago, the patient feels her mental   health is the same.    Recent Hospitalizations:  She was not admitted to the hospital during the last year.       Current Medical Providers:  Patient Care Team:  Lan Hollins DO as PCP - General (Family Medicine)    Outpatient Medications Prior to Visit   Medication Sig Dispense Refill   • diclofenac (VOLTAREN) 50 MG EC tablet TAKE ONE TABLET BY MOUTH TWICE DAILY WITH FOOD AS NEEDED 60 tablet 1   • DULoxetine (CYMBALTA) 20 MG  capsule Take 1 capsule by mouth Daily. 90 capsule 1   • furosemide (Lasix) 20 MG tablet Take 1 tablet by mouth Daily. 90 tablet 1   • gabapentin (NEURONTIN) 300 MG capsule TAKE ONE CAPSULE BY MOUTH TWICE DAILY 60 capsule 1   • potassium chloride 10 MEQ CR tablet Take 1 tablet by mouth Daily. 90 tablet 1   • pramipexole (MIRAPEX) 0.25 MG tablet Take 1 tablet by mouth Every Night. 90 tablet 2   • traMADol (ULTRAM) 50 MG tablet TAKE ONE TABLET BY MOUTH EVERY 6 HOURS AS NEEDED FOR PAIN 90 tablet 2   • traZODone (DESYREL) 50 MG tablet      • vitamin B-12 (CYANOCOBALAMIN) 1000 MCG tablet Take 1,000 mcg by mouth Daily.     • vitamin C (ASCORBIC ACID) 250 MG tablet Take 250 mg by mouth Daily.     • vitamin D (ERGOCALCIFEROL) 1.25 MG (55346 UT) capsule capsule Take 50,000 Units by mouth 1 (One) Time Per Week.     • zinc sulfate (ZINCATE) 220 (50 Zn) MG capsule Take 220 mg by mouth Daily.     • temazepam (RESTORIL) 15 MG capsule Take 1 capsule by mouth At Night As Needed for Sleep. 30 capsule 0     No facility-administered medications prior to visit.       Opioid medication/s are on active medication list.  and I have evaluated her active treatment plan and pain score trends (see table).  There were no vitals filed for this visit.  I have reviewed the chart for potential of high risk medication and harmful drug interactions in the elderly.            Aspirin is not on active medication list.  Aspirin use is indicated based on review of current medical condition/s. Pros and cons of this therapy have been discussed with this patient. Benefits of this medication outweigh potential harm.  Patient has been instructed to start taking this medication..    Patient Active Problem List   Diagnosis   • Arthralgia of left knee   • Varicose veins of bilateral lower extremities with pain   • Primary osteoarthritis of left knee   • Essential hypertension   • Chronic diastolic (congestive) heart failure (HCC)   • Restless legs syndrome   •  "Caregiver stress   • Primary insomnia   • Obstructive sleep apnea on CPAP   • Vitamin B12 deficiency   • Traumatic tear of supraspinatus tendon of left shoulder   • Fibromyalgia   • Idiopathic osteoarthritis     Advance Care Planning  Advance Directive is on file.  ACP discussion was held with the patient during this visit. Patient has an advance directive in EMR which is still valid.     Review of Systems  1. Constitutional: Negative for fever. Negative for chills, diaphoresis, fatigue and unexpected weight change.   2. HENT: No dysphagia; no changes to vision/hearing/smell/taste; no epistaxis.    3. Eyes: Negative for redness and visual disturbance.   4. Respiratory: As per above. Negative for chest pain . Negative for cough and chest tightness.   5. Cardiovascular: Negative for chest pain and palpitations.   6. Gastrointestinal: Negative for abdominal distention, abdominal pain and blood in stool.   7. Endocrine: Negative for cold intolerance and heat intolerance.   8. Genitourinary: Negative for difficulty urinating, dysuria and frequency.   9. Musculoskeletal: Chronic arthralgias, back pain and myalgias.  Chronic left knee pain.  Worsened right knee pain as per above.  10. Skin: Negative for color change, rash and wound.   11. Neurological: Negative for syncope, weakness and headaches.  Paroxysmal vertigo as per above.  12. Hematological: Negative for adenopathy. Does not bruise/bleed easily.   13. Psychiatric/Behavioral: Negative for confusion. The patient is not nervous/anxious.             Objective    Vitals:    02/08/22 1308   BP: 138/76   Pulse: 73   Resp: 15   Temp: 98.4 °F (36.9 °C)   SpO2: 96%   Weight: 104 kg (229 lb)   Height: 167.6 cm (66\")     BMI Readings from Last 1 Encounters:   02/08/22 36.96 kg/m²   BMI is above normal parameters. Recommendations include: educational material, exercise counseling and nutrition counseling    Does the patient have evidence of cognitive impairment? No    Physical " Exam  Lab Results   Component Value Date    HGBA1C 5.0 02/09/2022        General Appearance: alert, oriented x 3, no acute distress.  Pleasant and interactive during questioning and examination.  Skin: warm and dry.  No jaundice.  HEENT: Atraumatic.  pupils round and reactive to light and accommodation, oral mucosa pink and moist.  Nares patent without epistaxis.  External auditory canals are patent tympanic membranes intact.  Mild postnasal drainage without pustules or exudate.  Neck: supple, no JVD, trachea midline.  No thyromegaly.  No stridor.  Lungs: CTA, unlabored breathing effort.  Heart: RRR, normal S1 and S2, no S3, no rub.  Abdomen: soft, non-tender, no palpable bladder, present bowel sounds to auscultation ×4.  No guarding or rigidity.  Extremities: no clubbing, cyanosis.  Good range of motion actively and passively; crepitance to marina knees on A/P ROM, medial/lateral joint line tenderness marina knees; quad mech intact.  Symmetric muscle strength and development; post-surgical scarring to left hip from LONG.  Diffuse varicose veins noted to the lower extremities with 1+ pitting edema that extends to the distal most third of the tibias bilaterally.  There is medial/lateral joint line tenderness of the right knee, no appreciable ligamentous laxity.  Ana's is positive.  Quadriceps mechanism intact bilaterally.  Noted crepitance on A/P ROM testing of the right knee.  Yasmine/Mackenzie sign negative.  Dorsiflexion/plantar flexion of bilateral feet is symmetric and normal.  No leg length discrepancies.  Neer's/Hawkin's test positive to the left shoulder, unable to abduct during empty can test, positive shrug sign.  Negative pushoff, but painful range of motion.   strength symmetric bilaterally.  Normal flexion/extension at the elbows, as well as supination/pronation of the forearms.  Cross body abduction creates significant discomfort.  Neuro: normal speech and mental status.  Cranial nerves II through XII  intact.  No anosmia. DTR 2+; proprioception intact.  No focal motor/sensory deficits.    HEALTH RISK ASSESSMENT    Smoking Status:  Social History     Tobacco Use   Smoking Status Never Smoker   Smokeless Tobacco Never Used     Alcohol Consumption:  Social History     Substance and Sexual Activity   Alcohol Use No     Fall Risk Screen:    Counts include 234 beds at the [facility] Fall Risk Assessment has not been completed.    Depression Screening:  PHQ-2/PHQ-9 Depression Screening 2/8/2022   Little interest or pleasure in doing things 0   Feeling down, depressed, or hopeless 0   Total Score 0       Health Habits and Functional and Cognitive Screening:  Functional & Cognitive Status 11/10/2020   Do you have difficulty preparing food and eating? No   Do you have difficulty bathing yourself, getting dressed or grooming yourself? No   Do you have difficulty using the toilet? No   Do you have difficulty moving around from place to place? Yes   Do you have trouble with steps or getting out of a bed or a chair? Yes   Current Diet Well Balanced Diet   Dental Exam Up to date   Eye Exam Up to date   Exercise (times per week) 0 times per week   Current Exercise Activities Include None   Do you need help using the phone?  No   Are you deaf or do you have serious difficulty hearing?  Yes   Do you need help with transportation? No   Do you need help shopping? Yes   Do you need help preparing meals?  No   Do you need help with housework?  Yes   Do you need help with laundry? No   Do you need help taking your medications? No   Do you need help managing money? No   Do you ever drive or ride in a car without wearing a seat belt? Yes   Have you felt unusual stress, anger or loneliness in the last month? No   Who do you live with? Spouse   If you need help, do you have trouble finding someone available to you? No   Do you have difficulty concentrating, remembering or making decisions? No       Age-appropriate Screening Schedule:  Refer to the list below for future  screening recommendations based on patient's age, sex and/or medical conditions. Orders for these recommended tests are listed in the plan section. The patient has been provided with a written plan.    Health Maintenance   Topic Date Due   • URINE MICROALBUMIN  Never done   • ZOSTER VACCINE (1 of 2) Never done   • DXA SCAN  06/28/2019   • LIPID PANEL  11/03/2020   • INFLUENZA VACCINE  02/08/2023 (Originally 8/1/2021)   • DIABETIC EYE EXAM  07/27/2022   • HEMOGLOBIN A1C  08/09/2022   • TDAP/TD VACCINES (2 - Td or Tdap) 06/22/2027              Assessment/Plan   CMS Preventative Services Quick Reference  Risk Factors Identified During Encounter  Cardiovascular Disease  Inactivity/Sedentary  The above risks/problems have been discussed with the patient.  Follow up actions/plans if indicated are seen below in the Assessment/Plan Section.  Pertinent information has been shared with the patient in the After Visit Summary.    Diagnoses and all orders for this visit:    1. Medicare annual wellness visit, subsequent (Primary)  -     CBC & Differential  -     Comprehensive Metabolic Panel    2. Chronic diastolic (congestive) heart failure (HCC)  -     CBC & Differential  -     Comprehensive Metabolic Panel    3. Essential hypertension  -     US Carotid Bilateral; Future  -     CBC & Differential  -     Comprehensive Metabolic Panel    4. Restless legs syndrome    5. Primary insomnia  -     temazepam (RESTORIL) 30 MG capsule; Take 1 capsule by mouth At Night As Needed for Sleep.  Dispense: 30 capsule; Refill: 2    6. Obstructive sleep apnea on CPAP    7. Varicose veins of bilateral lower extremities with pain    8. Impaired fasting blood sugar  -     POC Glycosylated Hemoglobin (Hb A1C)    9. Paroxysmal vertigo  -     US Carotid Bilateral; Future        Follow Up:   Return in about 3 months (around 5/8/2022) for Recheck, Med Change/New Meds.     An After Visit Summary and PPPS were made available to the patient.    Patient has  been erroneously marked as diabetic. Based on the available clinical information, she does not have diabetes and should therefore be excluded from diabetic health maintenance and quality measures for the remainder of the reporting period.    VSS, BP is @ goal; appears HD asymptomatic; given left neck discomfort, and paroxysmal vertigo, will order carotid u/s.    HbA1c normal; no signs of DM.    Continue low sodium/salt dietary intake.    Surveillance CBC/CMP today.        I spent 55 minutes caring for [place] on this date of service. This time includes time spent by me in the following activities:preparing for the visit, performing a medically appropriate examination and/or evaluation , counseling and educating the patient/family/caregiver, ordering medications, tests, or procedures, documenting information in the medical record and care coordination    I have reviewed and updated all copied forward information, as appropriate.  I attest to the accuracy and relevance of any unchanged information.           Transcribed from ambient dictation for Lan Hollins DO by Haris Cheng.  02/09/22   10:49 EST    Patient verbalized consent to the visit recording.  I have personally performed the services described in this document as transcribed by the above individual, and it is both accurate and complete.  Lan Hollins DO  2/9/2022  11:58 EST

## 2022-02-08 NOTE — PATIENT INSTRUCTIONS
Advance Care Planning and Advance Directives     You make decisions on a daily basis - decisions about where you want to live, your career, your home, your life. Perhaps one of the most important decisions you face is your choice for future medical care. Take time to talk with your family and your healthcare team and start planning today.  Advance Care Planning is a process that can help you:  · Understand possible future healthcare decisions in light of your own experiences  · Reflect on those decision in light of your goals and values  · Discuss your decisions with those closest to you and the healthcare professionals that care for you  · Make a plan by creating a document that reflects your wishes    Surrogate Decision Maker  In the event of a medical emergency, which has left you unable to communicate or to make your own decisions, you would need someone to make decisions for you.  It is important to discuss your preferences for medical treatment with this person while you are in good health.     Qualities of a surrogate decision maker:  • Willing to take on this role and responsibility  • Knows what you want for future medical care  • Willing to follow your wishes even if they don't agree with them  • Able to make difficult medical decisions under stressful circumstances    Advance Directives  These are legal documents you can create that will guide your healthcare team and decision maker(s) when needed. These documents can be stored in the electronic medical record.    · Living Will - a legal document to guide your care if you have a terminal condition or a serious illness and are unable to communicate. States vary by statute in document names/types, but most forms may include one or more of the following:        -  Directions regarding life-prolonging treatments        -  Directions regarding artificially provided nutrition/hydration        -  Choosing a healthcare decision maker        -  Direction  regarding organ/tissue donation    · Durable Power of  for Healthcare - this document names an -in-fact to make medical decisions for you, but it may also allow this person to make personal and financial decisions for you. Please seek the advice of an  if you need this type of document.    **Advance Directives are not required and no one may discriminate against you if you do not sign one.    Medical Orders  Many states allow specific forms/orders signed by your physician to record your wishes for medical treatment in your current state of health. This form, signed in personal communication with your physician, addresses resuscitation and other medical interventions that you may or may not want.      For more information or to schedule a time with a [facility] Advance Care Planning Facilitator contact: [facility] or call [phone] and someone will contact you directly.  You are due for Shingrix vaccination series ( the newest shingles vaccine).  It is a two shot series spaced 2-6 months apart. Please get this vaccine series started at your earliest convenience at your local pharmacy to help avoid shingles outbreak. It is more effective than the old Zostavax vaccine and is recommended even if you have had the Zostavax vaccine in the past.  Once the Shingrix series is completed, it does not need to be repeated.   For more information, please look at the website below:  [facility] Shingrix Vaccine Information      Medicare Wellness  Personal Prevention Plan of Service     Date of Office Visit:    Encounter Provider:  Lan Hollins DO  Place of Service:  [facility]  Patient Name: Seema Petit  :  1940    As part of the Medicare Wellness portion of your visit today, we are providing you with this personalized preventive plan of services (PPPS). This plan is based upon recommendations of the [place] Preventive Services Task Force  (USPSTF) and the Advisory Committee on Immunization Practices (ACIP).    This lists the preventive care services that should be considered, and provides dates of when you are due. Items listed as completed are up-to-date and do not require any further intervention.    Health Maintenance   Topic Date Due   • URINE MICROALBUMIN  Never done   • COVID-19 Vaccine (1) Never done   • ZOSTER VACCINE (1 of 2) Never done   • DXA SCAN  06/28/2019   • HEPATITIS C SCREENING  Never done   • LIPID PANEL  11/03/2020   • HEMOGLOBIN A1C  11/03/2020   • ANNUAL WELLNESS VISIT  11/10/2021   • INFLUENZA VACCINE  02/08/2023 (Originally 8/1/2021)   • DIABETIC EYE EXAM  07/27/2022   • TDAP/TD VACCINES (2 - Td or Tdap) 06/22/2027   • Pneumococcal Vaccine 65+  Completed       No orders of the defined types were placed in this encounter.      No follow-ups on file.

## 2022-02-09 LAB
ALBUMIN SERPL-MCNC: 4.1 G/DL (ref 3.5–5.2)
ALBUMIN/GLOB SERPL: 1.6 G/DL
ALP SERPL-CCNC: 79 U/L (ref 39–117)
ALT SERPL-CCNC: 13 U/L (ref 1–33)
AST SERPL-CCNC: 14 U/L (ref 1–32)
BASOPHILS # BLD AUTO: 0.05 10*3/MM3 (ref 0–0.2)
BASOPHILS NFR BLD AUTO: 0.6 % (ref 0–1.5)
BILIRUB SERPL-MCNC: 0.4 MG/DL (ref 0–1.2)
BUN SERPL-MCNC: 15 MG/DL (ref 8–23)
BUN/CREAT SERPL: 17.6 (ref 7–25)
CALCIUM SERPL-MCNC: 10 MG/DL (ref 8.6–10.5)
CHLORIDE SERPL-SCNC: 104 MMOL/L (ref 98–107)
CO2 SERPL-SCNC: 31.8 MMOL/L (ref 22–29)
CREAT SERPL-MCNC: 0.85 MG/DL (ref 0.57–1)
EOSINOPHIL # BLD AUTO: 0.18 10*3/MM3 (ref 0–0.4)
EOSINOPHIL NFR BLD AUTO: 2.2 % (ref 0.3–6.2)
ERYTHROCYTE [DISTWIDTH] IN BLOOD BY AUTOMATED COUNT: 12.8 % (ref 12.3–15.4)
EXPIRATION DATE: NORMAL
GLOBULIN SER CALC-MCNC: 2.6 GM/DL
GLUCOSE SERPL-MCNC: 89 MG/DL (ref 65–99)
HBA1C MFR BLD: 5 %
HCT VFR BLD AUTO: 41 % (ref 34–46.6)
HGB BLD-MCNC: 13.4 G/DL (ref 12–15.9)
IMM GRANULOCYTES # BLD AUTO: 0.02 10*3/MM3 (ref 0–0.05)
IMM GRANULOCYTES NFR BLD AUTO: 0.2 % (ref 0–0.5)
LYMPHOCYTES # BLD AUTO: 4 10*3/MM3 (ref 0.7–3.1)
LYMPHOCYTES NFR BLD AUTO: 47.9 % (ref 19.6–45.3)
Lab: 1
MCH RBC QN AUTO: 30.8 PG (ref 26.6–33)
MCHC RBC AUTO-ENTMCNC: 32.7 G/DL (ref 31.5–35.7)
MCV RBC AUTO: 94.3 FL (ref 79–97)
MONOCYTES # BLD AUTO: 0.6 10*3/MM3 (ref 0.1–0.9)
MONOCYTES NFR BLD AUTO: 7.2 % (ref 5–12)
NEUTROPHILS # BLD AUTO: 3.5 10*3/MM3 (ref 1.7–7)
NEUTROPHILS NFR BLD AUTO: 41.9 % (ref 42.7–76)
NRBC BLD AUTO-RTO: 0 /100 WBC (ref 0–0.2)
PLATELET # BLD AUTO: 213 10*3/MM3 (ref 140–450)
POTASSIUM SERPL-SCNC: 4.8 MMOL/L (ref 3.5–5.2)
PROT SERPL-MCNC: 6.7 G/DL (ref 6–8.5)
RBC # BLD AUTO: 4.35 10*6/MM3 (ref 3.77–5.28)
SODIUM SERPL-SCNC: 145 MMOL/L (ref 136–145)
WBC # BLD AUTO: 8.35 10*3/MM3 (ref 3.4–10.8)

## 2022-02-11 ENCOUNTER — APPOINTMENT (OUTPATIENT)
Dept: ULTRASOUND IMAGING | Facility: HOSPITAL | Age: 82
End: 2022-02-11

## 2022-02-28 ENCOUNTER — TELEPHONE (OUTPATIENT)
Dept: FAMILY MEDICINE CLINIC | Facility: CLINIC | Age: 82
End: 2022-02-28

## 2022-02-28 NOTE — TELEPHONE ENCOUNTER
Caller: Seema Petit    Relationship: Self    Best call back number: [phone]    What is the best time to reach you: ANYTIME     Who are you requesting to speak with (clinical staff, provider,  specific staff member): DR. QUEEN     What was the call regarding: PATIENT IS ASKING FOR A CALL BACK FROM HER PROVIDER. ITS TO DO WITH THE PAIN IN HER LEGS WHEN SHE WALKS. SHE SAYS THAT BOTH LEGS ARE CAUSING HER PAIN AND SHE WOULD LIKE TO DISCUSS GETTING A REFERRAL.     Do you require a callback: YES

## 2022-03-02 ENCOUNTER — TELEPHONE (OUTPATIENT)
Dept: FAMILY MEDICINE CLINIC | Facility: CLINIC | Age: 82
End: 2022-03-02

## 2022-03-02 NOTE — TELEPHONE ENCOUNTER
LVM to schedule AWV that is Overdue since 2020, I offered later that same day. Please advise patient and schedule. Thanks.

## 2022-03-16 DIAGNOSIS — G25.81 RESTLESS LEGS SYNDROME: ICD-10-CM

## 2022-03-16 RX ORDER — GABAPENTIN 300 MG/1
CAPSULE ORAL
Qty: 60 CAPSULE | Refills: 1 | Status: SHIPPED | OUTPATIENT
Start: 2022-03-16 | End: 2022-05-20

## 2022-04-14 ENCOUNTER — PRIOR AUTHORIZATION (OUTPATIENT)
Dept: FAMILY MEDICINE CLINIC | Facility: CLINIC | Age: 82
End: 2022-04-14

## 2022-04-14 NOTE — TELEPHONE ENCOUNTER
A PRIOR AUTH HAS BEEN STARTED THROUGH [employer] FOR TEMAZEPAM.    CURRENTLY WAITING ON A RESPONSE FROM THE INSURANCE.    Key: [id number]

## 2022-04-17 DIAGNOSIS — M17.12 PRIMARY OSTEOARTHRITIS OF LEFT KNEE: ICD-10-CM

## 2022-04-17 DIAGNOSIS — G25.81 RESTLESS LEGS SYNDROME: ICD-10-CM

## 2022-04-17 DIAGNOSIS — I83.813 VARICOSE VEINS OF BILATERAL LOWER EXTREMITIES WITH PAIN: ICD-10-CM

## 2022-04-17 DIAGNOSIS — M25.562 ARTHRALGIA OF LEFT KNEE: ICD-10-CM

## 2022-04-17 DIAGNOSIS — I10 ESSENTIAL HYPERTENSION: ICD-10-CM

## 2022-04-17 DIAGNOSIS — M25.512 ARTHRALGIA OF LEFT SHOULDER REGION: ICD-10-CM

## 2022-04-17 DIAGNOSIS — I50.32 CHRONIC DIASTOLIC (CONGESTIVE) HEART FAILURE: ICD-10-CM

## 2022-04-18 RX ORDER — FUROSEMIDE 20 MG/1
TABLET ORAL
Qty: 90 TABLET | Refills: 1 | Status: SHIPPED | OUTPATIENT
Start: 2022-04-18 | End: 2022-12-27

## 2022-04-18 RX ORDER — DULOXETIN HYDROCHLORIDE 20 MG/1
CAPSULE, DELAYED RELEASE ORAL
Qty: 90 CAPSULE | Refills: 1 | Status: SHIPPED | OUTPATIENT
Start: 2022-04-18 | End: 2022-05-10 | Stop reason: SDUPTHER

## 2022-04-20 ENCOUNTER — TELEPHONE (OUTPATIENT)
Dept: FAMILY MEDICINE CLINIC | Facility: CLINIC | Age: 82
End: 2022-04-20

## 2022-04-20 RX ORDER — TRAMADOL HYDROCHLORIDE 50 MG/1
TABLET ORAL
Qty: 90 TABLET | Refills: 2 | Status: SHIPPED | OUTPATIENT
Start: 2022-04-20 | End: 2023-02-24

## 2022-04-20 NOTE — TELEPHONE ENCOUNTER
Provider: BRET     Caller: JOSE RAUL     Relationship to Patient: [facility]     Phone Number: [phone]    Reason for Call: JOSE RAUL STATES THAT SHE NEEDS TRY AND FAILS VERIFICATION FOR TEMAZEPAM

## 2022-04-20 NOTE — TELEPHONE ENCOUNTER
Rx Refill Note  Requested Prescriptions     Pending Prescriptions Disp Refills   • traMADol (ULTRAM) 50 MG tablet [Pharmacy Med Name: tramadol 50 mg tablet] 90 tablet 2     Sig: TAKE ONE TABLET BY MOUTH EVERY 6 HOURS AS NEEDED FOR PAIN      Last office visit with prescribing clinician: 2/8/2022      Next office visit with prescribing clinician: 5/9/2022            Eveline Pitts MA  04/20/22, 13:00 EDT

## 2022-04-25 NOTE — TELEPHONE ENCOUNTER
Patients medication has been approved through the insurance.    Pharmacy has been notified.    Approval start date: 04/15/2022    Approval end date: 12/31/2039

## 2022-05-09 ENCOUNTER — OFFICE VISIT (OUTPATIENT)
Dept: FAMILY MEDICINE CLINIC | Facility: CLINIC | Age: 82
End: 2022-05-09

## 2022-05-09 VITALS
HEART RATE: 63 BPM | SYSTOLIC BLOOD PRESSURE: 128 MMHG | RESPIRATION RATE: 16 BRPM | OXYGEN SATURATION: 97 % | BODY MASS INDEX: 34.55 KG/M2 | HEIGHT: 66 IN | WEIGHT: 215 LBS | TEMPERATURE: 98.3 F | DIASTOLIC BLOOD PRESSURE: 84 MMHG

## 2022-05-09 DIAGNOSIS — M25.561 ARTHRALGIA OF BOTH KNEES: ICD-10-CM

## 2022-05-09 DIAGNOSIS — M25.562 ARTHRALGIA OF BOTH KNEES: ICD-10-CM

## 2022-05-09 DIAGNOSIS — F43.21 COMPLICATED GRIEVING: ICD-10-CM

## 2022-05-09 DIAGNOSIS — F51.01 PRIMARY INSOMNIA: Primary | ICD-10-CM

## 2022-05-09 DIAGNOSIS — G25.81 RESTLESS LEGS SYNDROME: ICD-10-CM

## 2022-05-09 DIAGNOSIS — M17.0 PRIMARY OSTEOARTHRITIS OF KNEES, BILATERAL: ICD-10-CM

## 2022-05-09 PROCEDURE — 99214 OFFICE O/P EST MOD 30 MIN: CPT | Performed by: FAMILY MEDICINE

## 2022-05-09 PROCEDURE — 20610 DRAIN/INJ JOINT/BURSA W/O US: CPT | Performed by: FAMILY MEDICINE

## 2022-05-09 RX ORDER — DIAZEPAM 2 MG/1
2 TABLET ORAL NIGHTLY PRN
Qty: 30 TABLET | Refills: 1 | Status: SHIPPED | OUTPATIENT
Start: 2022-05-09 | End: 2022-07-25

## 2022-05-09 RX ADMIN — LIDOCAINE HYDROCHLORIDE 1 ML: 10 INJECTION, SOLUTION INFILTRATION; PERINEURAL at 14:20

## 2022-05-09 RX ADMIN — BETAMETHASONE SODIUM PHOSPHATE AND BETAMETHASONE ACETATE 12 MG: 3; 3 INJECTION, SUSPENSION INTRA-ARTICULAR; INTRALESIONAL; INTRAMUSCULAR; SOFT TISSUE at 14:20

## 2022-05-09 NOTE — PROGRESS NOTES
Established Patient        Chief Complaint:   Chief Complaint   Patient presents with   • Follow-up   • Knee Pain     BOTH KNEE PAIN, HARD TO WALK, POPPING.  STILL WAKING UP IN THE MIDDLE OF THE NIGHT WITH TAKING MEDS.         Seema Petit is a 81 y.o. female    History of Present Illness:   Here today in scheduled follow-up visit of her primary insomnia, restless leg syndrome and complicated grieving.    Patient complains of worsened arthralgias of bilateral knees.  No new falls or injuries.  Her last joint injection was approximately 6 months ago.  She reports good response to that therapy.  She denies any fever, chills or night sweats.  Denies any aspiration or dysphagia.    She has had some worsening of her grieving since the death of her , periodic times of remembering situations.  She denies any SI/HI.    Previously taking temazepam and trazodone and alternating nights.  She reports good response to medications when she takes them, however she does continue to awaken at approximately 3 AM on most nights of the week, unable to fall back asleep easily.    Subjective     The following portions of the patient's history were reviewed and updated as appropriate: allergies, current medications, past family history, past medical history, past social history, past surgical history and problem list.    Allergies   Allergen Reactions   • Quinine Derivatives Other (See Comments)     FLU LIKE SYMPTOMS       Review of Systems  1. Constitutional: Negative for fever. Negative for chills, diaphoresis, fatigue and unexpected weight change.   2. HENT: No dysphagia; no changes to vision/hearing/smell/taste; no epistaxis.    3. Eyes: Negative for redness and visual disturbance.   4. Respiratory: As per above. Negative for chest pain . Negative for cough and chest tightness.   5. Cardiovascular: Negative for chest pain and palpitations.   6. Gastrointestinal: Negative for abdominal distention, abdominal pain and  "blood in stool.   7. Endocrine: Negative for cold intolerance and heat intolerance.   8. Genitourinary: Negative for difficulty urinating, dysuria and frequency.   9. Musculoskeletal: Chronic arthralgias, back pain and myalgias.  Chronic left knee pain.  Worsened knee pain as per above.  10. Skin: Negative for color change, rash and wound.   11. Neurological: Negative for syncope, weakness and headaches.   12. Hematological: Negative for adenopathy. Does not bruise/bleed easily.   13. Psychiatric/Behavioral: Negative for confusion. The patient is not nervous/anxious.      Objective     Physical Exam   Vital Signs: /84   Pulse 63   Temp 98.3 °F (36.8 °C)   Resp 16   Ht 167.6 cm (66\")   Wt 97.5 kg (215 lb)   SpO2 97%   BMI 34.70 kg/m²     General Appearance: alert, oriented x 3, no acute distress.  Pleasant and interactive during questioning and examination.  Skin: warm and dry.  No jaundice.  HEENT: Atraumatic.  pupils round and reactive to light and accommodation, oral mucosa pink and moist.  Nares patent without epistaxis.  External auditory canals are patent tympanic membranes intact.  Mild postnasal drainage without pustules or exudate.  Neck: supple, no JVD, trachea midline.  No thyromegaly.  No stridor.  Lungs: CTA, unlabored breathing effort.  Heart: RRR, normal S1 and S2, no S3, no rub.  Abdomen: soft, non-tender, no palpable bladder, present bowel sounds to auscultation ×4.  No guarding or rigidity.  Extremities: no clubbing, cyanosis.  Good range of motion actively and passively; crepitance to marina knees on A/P ROM, medial/lateral joint line tenderness marina knees; quad mech intact.  Symmetric muscle strength and development; post-surgical scarring to left hip from LONG.  Diffuse varicose veins noted to the lower extremities with 1+ pitting edema that extends to the distal most third of the tibias bilaterally.  There is medial/lateral joint line tenderness of bilateral knees, no appreciable " ligamentous laxity.  Quadriceps mechanism intact bilaterally.  Noted crepitance on A/P ROM testing of lateral knees.  Yasmine/Mackenzie sign negative.  Dorsiflexion/plantar flexion of bilateral feet is symmetric and normal.  No leg length discrepancies.  Neer's/Hawkin's test positive to the left shoulder, unable to abduct during empty can test, positive shrug sign.  Negative pushoff, but painful range of motion.   strength symmetric bilaterally.  Normal flexion/extension at the elbows, as well as supination/pronation of the forearms.  Cross body abduction creates significant discomfort.  Neuro: normal speech and mental status.  Cranial nerves II through XII intact.  No anosmia. DTR 2+; proprioception intact.  No focal motor/sensory deficits.    Arthrocentesis    Date/Time: 5/9/2022 2:20 PM  Performed by: Lan Hollins DO  Authorized by: Lan Hollins DO   Indications: joint swelling and pain   Body area: knee  Joint: right knee  Local anesthesia used: yes    Anesthesia:  Local anesthesia used: yes  Local anesthetic: Ethyl chloride spray.    Sedation:  Patient sedated: no    Preparation: Patient was prepped and draped in the usual sterile fashion.  Needle size: 22 G  Ultrasound guidance: no  Approach: anterior  Meds administered: 12 mg betamethasone acetate-betamethasone sodium phosphate 6 (3-3) MG/ML; 1 mL lidocaine 1 %  Patient tolerance: patient tolerated the procedure well with no immediate complications    Arthrocentesis    Date/Time: 5/9/2022 2:20 PM  Performed by: Lan Hollins DO  Authorized by: Lan Hollins DO   Indications: pain and joint swelling   Body area: knee  Joint: left knee  Local anesthesia used: yes    Anesthesia:  Local anesthesia used: yes  Local anesthetic: Ethyl chloride spray.    Sedation:  Patient sedated: no    Preparation: Patient was prepped and draped in the usual sterile fashion.  Needle size: 22 G  Ultrasound guidance: no  Approach: anterior  Meds administered: 12 mg  betamethasone acetate-betamethasone sodium phosphate 6 (3-3) MG/ML; 1 mL lidocaine 1 %  Patient tolerance: patient tolerated the procedure well with no immediate complications            Assessment and Plan      Assessment:   Diagnoses and all orders for this visit:    1. Primary insomnia (Primary)  -     diazePAM (Valium) 2 MG tablet; Take 1 tablet by mouth At Night As Needed (insomnia).  Dispense: 30 tablet; Refill: 1    2. Restless legs syndrome  -     DULoxetine (CYMBALTA) 30 MG capsule; Take 1 capsule by mouth Daily.  Dispense: 90 capsule; Refill: 1    3. Complicated grieving  -     diazePAM (Valium) 2 MG tablet; Take 1 tablet by mouth At Night As Needed (insomnia).  Dispense: 30 tablet; Refill: 1  -     DULoxetine (CYMBALTA) 30 MG capsule; Take 1 capsule by mouth Daily.  Dispense: 90 capsule; Refill: 1    4. Arthralgia of both knees  -     Arthrocentesis  -     Arthrocentesis    5. Primary osteoarthritis of knees, bilateral  -     Arthrocentesis  -     Arthrocentesis        Plan:  Discussed need for stress/anxiety reducing techniques such as prayer/meditation/breathing and counting exercises and avoidance of stress producing environments/situations; will follow clinically.  I have recommended discontinuation of temazepam and Restoril.  We will instead utilize diazepam in an effort to achieve longer duration of activity at night.  I have asked patient to notify the office should she develop any ill effects to the new medication.  Planned titration of duloxetine dosing to 30 mg daily.     Vital signs stable, appears hemodynamically asymptomatic.    Patient tolerated bilateral arthrocentesis without complication.  Should she not realize therapeutic benefit of these injections, consideration for viscosupplementation, referral to orthopedic surgery.      Discussion Summary:    Discussed plan of care in detail with pt today; pt verb understanding and agrees.    I spent 40 minutes caring for [place] on this date of  service; this includes time spent in management of insomnia, restless leg syndrome, complicated grieving and primary osteoarthritis of bilateral knees, as well as performing arthrocentesis of bilateral knees. This time includes time spent by me in the following activities:preparing for the visit, performing a medically appropriate examination and/or evaluation , counseling and educating the patient/family/caregiver, ordering medications, tests, or procedures, documenting information in the medical record and care coordination    I have reviewed and updated all copied forward information, as appropriate.  I attest to the accuracy and relevance of any unchanged information.    Follow up:  No follow-ups on file.     There are no Patient Instructions on file for this visit.    Lan Hollins,   11/09/21  13:52 EST          Please note that portions of this note may have been completed with a voice recognition program. Efforts were made to edit the dictations, but occasionally words are mistranscribed.

## 2022-05-10 RX ORDER — LIDOCAINE HYDROCHLORIDE 10 MG/ML
1 INJECTION, SOLUTION INFILTRATION; PERINEURAL
Status: COMPLETED | OUTPATIENT
Start: 2022-05-09 | End: 2022-05-09

## 2022-05-10 RX ORDER — BETAMETHASONE SODIUM PHOSPHATE AND BETAMETHASONE ACETATE 3; 3 MG/ML; MG/ML
12 INJECTION, SUSPENSION INTRA-ARTICULAR; INTRALESIONAL; INTRAMUSCULAR; SOFT TISSUE
Status: COMPLETED | OUTPATIENT
Start: 2022-05-09 | End: 2022-05-09

## 2022-05-10 RX ORDER — DULOXETIN HYDROCHLORIDE 30 MG/1
30 CAPSULE, DELAYED RELEASE ORAL DAILY
Qty: 90 CAPSULE | Refills: 1 | Status: SHIPPED | OUTPATIENT
Start: 2022-05-10 | End: 2022-10-06

## 2022-05-20 DIAGNOSIS — G25.81 RESTLESS LEGS SYNDROME: ICD-10-CM

## 2022-05-20 RX ORDER — GABAPENTIN 300 MG/1
CAPSULE ORAL
Qty: 60 CAPSULE | Refills: 1 | Status: SHIPPED | OUTPATIENT
Start: 2022-05-20 | End: 2022-08-07

## 2022-05-20 NOTE — TELEPHONE ENCOUNTER
Rx Refill Note  Requested Prescriptions     Pending Prescriptions Disp Refills   • gabapentin (NEURONTIN) 300 MG capsule [Pharmacy Med Name: gabapentin 300 mg capsule] 60 capsule 1     Sig: TAKE ONE CAPSULE BY MOUTH TWICE DAILY      Last office visit with prescribing clinician: 5/9/2022      Next office visit with prescribing clinician: 8/9/2022            Eveline Pitts MA  05/20/22, 08:13 EDT

## 2022-06-17 DIAGNOSIS — I50.32 CHRONIC DIASTOLIC (CONGESTIVE) HEART FAILURE: ICD-10-CM

## 2022-06-17 DIAGNOSIS — I10 ESSENTIAL HYPERTENSION: ICD-10-CM

## 2022-06-17 DIAGNOSIS — I83.813 VARICOSE VEINS OF BILATERAL LOWER EXTREMITIES WITH PAIN: ICD-10-CM

## 2022-06-17 RX ORDER — POTASSIUM CHLORIDE 750 MG/1
TABLET, FILM COATED, EXTENDED RELEASE ORAL
Qty: 90 TABLET | Refills: 1 | Status: SHIPPED | OUTPATIENT
Start: 2022-06-17 | End: 2022-12-30

## 2022-07-01 DIAGNOSIS — M25.562 ARTHRALGIA OF LEFT KNEE: ICD-10-CM

## 2022-07-01 DIAGNOSIS — M17.12 PRIMARY OSTEOARTHRITIS OF LEFT KNEE: ICD-10-CM

## 2022-07-01 DIAGNOSIS — M25.512 ARTHRALGIA OF LEFT SHOULDER REGION: ICD-10-CM

## 2022-07-05 ENCOUNTER — TELEPHONE (OUTPATIENT)
Dept: CARDIOLOGY | Facility: CLINIC | Age: 82
End: 2022-07-05

## 2022-07-05 NOTE — TELEPHONE ENCOUNTER
----- Message from Seema Petit sent at 7/3/2022 10:44 PM EDT -----  Regarding: Need to be CLEARED for total knee Anthroplasty  At present, Dr Peter, I am scheduled for a total right knee Anthroplasty on the 17th day of Oct.  I need you to confirm  to Dr. Dickerson  that I am okay.  ;    His addrdss:  Dr Dickerson                              [address]                                [place]  [address]                               Phone:  [phone]    Thank you ever so much Dr. Peter.  Seema Petit.

## 2022-07-22 DIAGNOSIS — F43.21 COMPLICATED GRIEVING: ICD-10-CM

## 2022-07-22 DIAGNOSIS — F51.01 PRIMARY INSOMNIA: ICD-10-CM

## 2022-07-22 NOTE — TELEPHONE ENCOUNTER
Rx Refill Note  Requested Prescriptions     Pending Prescriptions Disp Refills   • diazePAM (VALIUM) 2 MG tablet [Pharmacy Med Name: diazepam 2 mg tablet] 30 tablet 1     Sig: TAKE ONE TABLET BY MOUTH EVERY NIGHT AS NEEDED FOR INSOMNIA      Last office visit with prescribing clinician: 5/9/2022      Next office visit with prescribing clinician: 8/9/2022            Eveline Pitts MA  07/22/22, 08:13 EDT

## 2022-07-25 RX ORDER — DIAZEPAM 2 MG/1
TABLET ORAL
Qty: 30 TABLET | Refills: 1 | Status: SHIPPED | OUTPATIENT
Start: 2022-07-25 | End: 2022-09-19

## 2022-08-05 DIAGNOSIS — G25.81 RESTLESS LEGS SYNDROME: ICD-10-CM

## 2022-08-05 NOTE — TELEPHONE ENCOUNTER
Rx Refill Note  Requested Prescriptions     Pending Prescriptions Disp Refills   • gabapentin (NEURONTIN) 300 MG capsule [Pharmacy Med Name: gabapentin 300 mg capsule] 60 capsule 1     Sig: TAKE ONE CAPSULE BY MOUTH TWICE DAILY      Last office visit with prescribing clinician: 5/9/2022      Next office visit with prescribing clinician: 8/9/2022            Eveline Pitts MA  08/05/22, 08:08 EDT

## 2022-08-07 RX ORDER — GABAPENTIN 300 MG/1
CAPSULE ORAL
Qty: 60 CAPSULE | Refills: 1 | Status: SHIPPED | OUTPATIENT
Start: 2022-08-07 | End: 2022-10-18 | Stop reason: SDUPTHER

## 2022-08-25 ENCOUNTER — OFFICE VISIT (OUTPATIENT)
Dept: FAMILY MEDICINE CLINIC | Facility: CLINIC | Age: 82
End: 2022-08-25

## 2022-08-25 VITALS
OXYGEN SATURATION: 98 % | BODY MASS INDEX: 31.5 KG/M2 | DIASTOLIC BLOOD PRESSURE: 88 MMHG | TEMPERATURE: 98.1 F | SYSTOLIC BLOOD PRESSURE: 134 MMHG | HEIGHT: 66 IN | WEIGHT: 196 LBS | RESPIRATION RATE: 16 BRPM | HEART RATE: 74 BPM

## 2022-08-25 DIAGNOSIS — M25.562 ARTHRALGIA OF BOTH KNEES: Primary | ICD-10-CM

## 2022-08-25 DIAGNOSIS — M23.8X2 CREPITUS OF BOTH KNEE JOINTS: ICD-10-CM

## 2022-08-25 DIAGNOSIS — M25.561 ARTHRALGIA OF BOTH KNEES: Primary | ICD-10-CM

## 2022-08-25 DIAGNOSIS — M25.561 BILATERAL CHRONIC KNEE PAIN: ICD-10-CM

## 2022-08-25 DIAGNOSIS — M25.562 BILATERAL CHRONIC KNEE PAIN: ICD-10-CM

## 2022-08-25 DIAGNOSIS — M23.8X1 CREPITUS OF BOTH KNEE JOINTS: ICD-10-CM

## 2022-08-25 DIAGNOSIS — G89.29 BILATERAL CHRONIC KNEE PAIN: ICD-10-CM

## 2022-08-25 PROCEDURE — 99214 OFFICE O/P EST MOD 30 MIN: CPT | Performed by: NURSE PRACTITIONER

## 2022-08-25 RX ORDER — METHYLPREDNISOLONE ACETATE 80 MG/ML
80 INJECTION, SUSPENSION INTRA-ARTICULAR; INTRALESIONAL; INTRAMUSCULAR; SOFT TISSUE ONCE
Status: SHIPPED | OUTPATIENT
Start: 2022-08-25

## 2022-08-25 RX ORDER — HYDROCODONE BITARTRATE AND ACETAMINOPHEN 5; 325 MG/1; MG/1
1 TABLET ORAL EVERY 6 HOURS PRN
Qty: 30 TABLET | Refills: 0 | Status: SHIPPED | OUTPATIENT
Start: 2022-08-25 | End: 2022-10-18

## 2022-08-25 RX ORDER — PREDNISONE 10 MG/1
10 TABLET ORAL DAILY
Qty: 15 TABLET | Refills: 1 | Status: SHIPPED | OUTPATIENT
Start: 2022-08-25 | End: 2023-02-24

## 2022-08-25 NOTE — PROGRESS NOTES
Established Patient        Chief Complaint:   Chief Complaint   Patient presents with   • Leg Swelling         History of Present Illness:    Seema Petit is a 81 y.o. female who presents today for complaints of pain and swelling in bilateral lower extremities due to chronic arthralgias. Patient reports pain is consistent and worsening. Hurts to walk, inhibits sleep. Pain from knees down to feet bilaterally. Patient is scheduled for right total knee replacement in October. Upon assessment today patient is having difficulty standing or sitting due to pain.     Subjective     The following portions of the patient's history were reviewed and updated as appropriate: allergies, current medications, past family history, past medical history, past social history, past surgical history and problem list.    ALLERGIES  Allergies   Allergen Reactions   • Quinine Derivatives Other (See Comments)     FLU LIKE SYMPTOMS       ROS  Review of Systems  1. Constitutional: sleep disturbance  2. HENT: No dysphagia; no changes to vision/hearing/smell/taste; no epistaxis  3. Eyes: Negative for redness and visual disturbance.   4. Respiratory: negative for shortness of breath. Negative for chest pain . Negative for cough and chest tightness.   5. Cardiovascular: Negative for chest pain and palpitations.   6. Gastrointestinal: Negative for abdominal distention, abdominal pain and blood in stool.   7. Endocrine: Negative for cold intolerance and heat intolerance.   8. Genitourinary: Negative for difficulty urinating, dysuria and frequency.   9. Musculoskeletal: bilateral knee pain, stiffness, swelling bilateral lower legs  10. Skin: warmth, redness bilateral knees  11. Neurological: Negative for syncope, weakness and headaches.   12. Hematological: Negative for adenopathy. Does not bruise/bleed easily.   13. Psychiatric/Behavioral: Negative for confusion. The patient is not nervous/anxious.    Objective     Vital  "Signs:   /88   Pulse 74   Temp 98.1 °F (36.7 °C)   Resp 16   Ht 167.6 cm (66\")   Wt 88.9 kg (196 lb)   SpO2 98%   BMI 31.64 kg/m²     BMI is >= 30 and <35. (Class 1 Obesity). The following options were offered after discussion;: exercise counseling/recommendations and nutrition counseling/recommendations    Physical Exam   Physical Exam  General Appearance: alert, oriented x 3, no acute distress.  Skin: warm and dry.   HEENT: Atraumatic.  pupils round and reactive to light and accommodation, oral mucosa pink and moist.  Nares patent without epistaxis.  External auditory canals are patent tympanic membranes intact.  Neck: supple, no JVD, trachea midline.  No thyromegaly  Lungs: CTA, unlabored breathing effort.  Heart: RRR, normal S1 and S2, no S3, no rub.  Abdomen: soft, non-tender, no palpable bladder, present bowel sounds to auscultation ×4.  No guarding or rigidity.  Extremities: redness, warmth, crepitus present upon palpation bilateral knees, swelling bilateral lower legs, non-pitting  Neuro: normal speech and mental status.  Cranial nerves II through XII intact.  No anosmia. DTR 2+; proprioception intact.  No focal motor/sensory deficits.    Assessment and Plan      Assessment/Plan:   Diagnoses and all orders for this visit:    1. Arthralgia of both knees (Primary)  -     HYDROcodone-acetaminophen (NORCO) 5-325 MG per tablet; Take 1 tablet by mouth Every 6 (Six) Hours As Needed for Moderate Pain  or Severe Pain .  Dispense: 30 tablet; Refill: 0  -     predniSONE (DELTASONE) 10 MG tablet; Take 1 tablet by mouth Daily. TAKE 5 TABLETS BY MOUTH ON DAY 1, DECREASE DOSE BY 1 TABLET EACH DAY THEREAFTER. 5,4,3,2,1  Dispense: 15 tablet; Refill: 1  -     methylPREDNISolone acetate (DEPO-medrol) injection 80 mg    2. Bilateral chronic knee pain  -     HYDROcodone-acetaminophen (NORCO) 5-325 MG per tablet; Take 1 tablet by mouth Every 6 (Six) Hours As Needed for Moderate Pain  or Severe Pain .  Dispense: 30 " tablet; Refill: 0    3. Crepitus of both knee joints  -     predniSONE (DELTASONE) 10 MG tablet; Take 1 tablet by mouth Daily. TAKE 5 TABLETS BY MOUTH ON DAY 1, DECREASE DOSE BY 1 TABLET EACH DAY THEREAFTER. 5,4,3,2,1  Dispense: 15 tablet; Refill: 1  -     methylPREDNISolone acetate (DEPO-medrol) injection 80 mg    Discussion Summary:  Discussed plan of care in detail with pt today; pt verb understanding and agrees.    Follow up:  Return if symptoms worsen or fail to improve.     Patient Education:  Patient Instructions       Chronic Knee Pain, Adult  Chronic knee pain is pain in one or both knees that lasts longer than 3 months. Symptoms of chronic knee pain may include swelling, stiffness, and discomfort. Age-related wear and tear (osteoarthritis) of the knee joint is the most common cause of chronic knee pain. Other possible causes include:  · A long-term immune-related disease that causes inflammation of the knee (rheumatoid arthritis). This usually affects both knees.  · Inflammatory arthritis, such as gout or pseudogout.  · An injury to the knee that causes arthritis.  · An injury to the knee that damages the ligaments. Ligaments are strong tissues that connect bones to each other.  · Runner's knee or pain behind the kneecap.  Treatment for chronic knee pain depends on the cause. The main treatments for chronic knee pain are physical therapy and weight loss. This condition may also be treated with medicines, injections, a knee sleeve or brace, and by using crutches. Rest, ice, pressure (compression), and elevation, also known as RICE therapy, may also be recommended.  Follow these instructions at home:  If you have a knee sleeve or brace:    1. Wear the knee sleeve or brace as told by your health care provider. Remove it only as told by your health care provider.  2. Loosen it if your toes tingle, become numb, or turn cold and blue.  3. Keep it clean.  4. If the sleeve or brace is not waterproof:  ? Do not let  it get wet.  ? Remove it if allowed by your health care provider, or cover it with a watertight covering when you take a bath or a shower.     Managing pain, stiffness, and swelling         1. If directed, apply heat to the affected area as often as told by your health care provider. Use the heat source that your health care provider recommends, such as a moist heat pack or a heating pad.  ? If you have a removable knee sleeve or brace, remove it as told by your health care provider.  ? Place a towel between your skin and the heat source.  ? Leave the heat on for 20-30 minutes.  ? Remove the heat if your skin turns bright red. This is especially important if you are unable to feel pain, heat, or cold. You may have a greater risk of getting burned.  2. If directed, put ice on the affected area. To do this:  ? If you have a removable knee sleeve or brace, remove it as told by your health care provider.  ? Put ice in a plastic bag.  ? Place a towel between your skin and the bag.  ? Leave the ice on for 20 minutes, 2-3 times a day.  ? Remove the ice if your skin turns bright red. This is very important. If you cannot feel pain, heat, or cold, you have a greater risk of damage to the area.  3. Move your toes often to reduce stiffness and swelling.  4. Raise (elevate) the injured area above the level of your heart while you are sitting or lying down.  Activity  1. Avoid high-impact activities or exercises, such as running, jumping rope, or doing jumping jacks.  2. Follow the exercise plan that your health care provider designed for you. Your health care provider may suggest that you:  ? Avoid activities that make knee pain worse. This may require you to change your exercise routines, sport participation, or job duties.  ? Wear shoes with cushioned soles.  ? Avoid sports that require running and sudden changes in direction.  ? Do physical therapy. Physical therapy is planned to match your needs and abilities. It may  include exercises for strength, flexibility, stability, and endurance.  ? Do exercises that increase balance and strength, such as khushboo chi and yoga.  3. Do not use the injured limb to support your body weight until your health care provider says that you can. Use crutches as told by your health care provider.  4. Return to your normal activities as told by your health care provider. Ask your health care provider what activities are safe for you.  General instructions  · Take over-the-counter and prescription medicines only as told by your health care provider.  · Lose weight if you are overweight. Losing even a little weight can reduce knee pain. Ask your health care provider what your ideal weight is, and how to safely lose extra weight. A dietitian may be able to help you plan your meals.  · Do not use any products that contain nicotine or tobacco, such as cigarettes, e-cigarettes, and chewing tobacco. These can delay healing. If you need help quitting, ask your health care provider.  · Keep all follow-up visits. This is important.  Contact a health care provider if:  · You have knee pain that is not getting better or gets worse.  · You are unable to do your physical therapy exercises due to knee pain.  Get help right away if:  · Your knee swells and the swelling becomes worse.  · You cannot move your knee.  · You have severe knee pain.  Summary  · Knee pain that lasts more than 3 months is considered chronic knee pain.  · The main treatments for chronic knee pain are physical therapy and weight loss. You may also need to take medicines, wear a knee sleeve or brace, use crutches, and apply ice or heat.  · Losing even a little weight can reduce knee pain. Ask your health care provider what your ideal weight is, and how to safely lose extra weight. A dietitian may be able to help you plan your meals.  · Follow the exercise plan that your health care provider designed for you.  This information is not intended to  replace advice given to you by your health care provider. Make sure you discuss any questions you have with your health care provider.  Document Revised: 06/02/2021 Document Reviewed: 06/02/2021  [employer] Patient Education © 2021 [employer]          MAYUR Lynn  08/30/22  14:55 EDT          Please note that portions of this note may have been completed with a voice recognition program. Efforts were made to edit the dictations, but occasionally words are mistranscribed.

## 2022-08-30 NOTE — PATIENT INSTRUCTIONS
Chronic Knee Pain, Adult  Chronic knee pain is pain in one or both knees that lasts longer than 3 months. Symptoms of chronic knee pain may include swelling, stiffness, and discomfort. Age-related wear and tear (osteoarthritis) of the knee joint is the most common cause of chronic knee pain. Other possible causes include:  A long-term immune-related disease that causes inflammation of the knee (rheumatoid arthritis). This usually affects both knees.  Inflammatory arthritis, such as gout or pseudogout.  An injury to the knee that causes arthritis.  An injury to the knee that damages the ligaments. Ligaments are strong tissues that connect bones to each other.  Runner's knee or pain behind the kneecap.  Treatment for chronic knee pain depends on the cause. The main treatments for chronic knee pain are physical therapy and weight loss. This condition may also be treated with medicines, injections, a knee sleeve or brace, and by using crutches. Rest, ice, pressure (compression), and elevation, also known as RICE therapy, may also be recommended.  Follow these instructions at home:  If you have a knee sleeve or brace:    Wear the knee sleeve or brace as told by your health care provider. Remove it only as told by your health care provider.  Loosen it if your toes tingle, become numb, or turn cold and blue.  Keep it clean.  If the sleeve or brace is not waterproof:  Do not let it get wet.  Remove it if allowed by your health care provider, or cover it with a watertight covering when you take a bath or a shower.     Managing pain, stiffness, and swelling         If directed, apply heat to the affected area as often as told by your health care provider. Use the heat source that your health care provider recommends, such as a moist heat pack or a heating pad.  If you have a removable knee sleeve or brace, remove it as told by your health care provider.  Place a towel between your skin and the heat source.  Leave the heat  on for 20-30 minutes.  Remove the heat if your skin turns bright red. This is especially important if you are unable to feel pain, heat, or cold. You may have a greater risk of getting burned.  If directed, put ice on the affected area. To do this:  If you have a removable knee sleeve or brace, remove it as told by your health care provider.  Put ice in a plastic bag.  Place a towel between your skin and the bag.  Leave the ice on for 20 minutes, 2-3 times a day.  Remove the ice if your skin turns bright red. This is very important. If you cannot feel pain, heat, or cold, you have a greater risk of damage to the area.  Move your toes often to reduce stiffness and swelling.  Raise (elevate) the injured area above the level of your heart while you are sitting or lying down.  Activity  Avoid high-impact activities or exercises, such as running, jumping rope, or doing jumping jacks.  Follow the exercise plan that your health care provider designed for you. Your health care provider may suggest that you:  Avoid activities that make knee pain worse. This may require you to change your exercise routines, sport participation, or job duties.  Wear shoes with cushioned soles.  Avoid sports that require running and sudden changes in direction.  Do physical therapy. Physical therapy is planned to match your needs and abilities. It may include exercises for strength, flexibility, stability, and endurance.  Do exercises that increase balance and strength, such as khushboo chi and yoga.  Do not use the injured limb to support your body weight until your health care provider says that you can. Use crutches as told by your health care provider.  Return to your normal activities as told by your health care provider. Ask your health care provider what activities are safe for you.  General instructions  Take over-the-counter and prescription medicines only as told by your health care provider.  Lose weight if you are overweight. Losing even  a little weight can reduce knee pain. Ask your health care provider what your ideal weight is, and how to safely lose extra weight. A dietitian may be able to help you plan your meals.  Do not use any products that contain nicotine or tobacco, such as cigarettes, e-cigarettes, and chewing tobacco. These can delay healing. If you need help quitting, ask your health care provider.  Keep all follow-up visits. This is important.  Contact a health care provider if:  You have knee pain that is not getting better or gets worse.  You are unable to do your physical therapy exercises due to knee pain.  Get help right away if:  Your knee swells and the swelling becomes worse.  You cannot move your knee.  You have severe knee pain.  Summary  Knee pain that lasts more than 3 months is considered chronic knee pain.  The main treatments for chronic knee pain are physical therapy and weight loss. You may also need to take medicines, wear a knee sleeve or brace, use crutches, and apply ice or heat.  Losing even a little weight can reduce knee pain. Ask your health care provider what your ideal weight is, and how to safely lose extra weight. A dietitian may be able to help you plan your meals.  Follow the exercise plan that your health care provider designed for you.  This information is not intended to replace advice given to you by your health care provider. Make sure you discuss any questions you have with your health care provider.  Document Revised: 06/02/2021 Document Reviewed: 06/02/2021  [employer] Patient Education © 2021 [employer]

## 2022-09-19 DIAGNOSIS — F51.01 PRIMARY INSOMNIA: ICD-10-CM

## 2022-09-19 DIAGNOSIS — F43.21 COMPLICATED GRIEVING: ICD-10-CM

## 2022-09-19 RX ORDER — DIAZEPAM 2 MG/1
TABLET ORAL
Qty: 30 TABLET | Refills: 1 | Status: SHIPPED | OUTPATIENT
Start: 2022-09-19 | End: 2022-10-18 | Stop reason: SDUPTHER

## 2022-10-06 DIAGNOSIS — M25.562 ARTHRALGIA OF LEFT KNEE: ICD-10-CM

## 2022-10-06 DIAGNOSIS — G25.81 RESTLESS LEGS SYNDROME: ICD-10-CM

## 2022-10-06 DIAGNOSIS — M17.12 PRIMARY OSTEOARTHRITIS OF LEFT KNEE: ICD-10-CM

## 2022-10-06 DIAGNOSIS — F43.21 COMPLICATED GRIEVING: ICD-10-CM

## 2022-10-06 DIAGNOSIS — M25.512 ARTHRALGIA OF LEFT SHOULDER REGION: ICD-10-CM

## 2022-10-06 RX ORDER — DULOXETIN HYDROCHLORIDE 30 MG/1
CAPSULE, DELAYED RELEASE ORAL
Qty: 90 CAPSULE | Refills: 2 | Status: SHIPPED | OUTPATIENT
Start: 2022-10-06

## 2022-10-06 RX ORDER — PRAMIPEXOLE DIHYDROCHLORIDE 0.25 MG/1
TABLET ORAL
Qty: 90 TABLET | Refills: 2 | Status: SHIPPED | OUTPATIENT
Start: 2022-10-06

## 2022-10-18 DIAGNOSIS — M19.90 IDIOPATHIC OSTEOARTHRITIS: ICD-10-CM

## 2022-10-18 DIAGNOSIS — F43.21 COMPLICATED GRIEVING: ICD-10-CM

## 2022-10-18 DIAGNOSIS — M79.7 FIBROMYALGIA: Primary | ICD-10-CM

## 2022-10-18 DIAGNOSIS — G25.81 RESTLESS LEGS SYNDROME: ICD-10-CM

## 2022-10-18 DIAGNOSIS — F51.01 PRIMARY INSOMNIA: ICD-10-CM

## 2022-10-18 RX ORDER — DIAZEPAM 2 MG/1
2 TABLET ORAL NIGHTLY PRN
Qty: 30 TABLET | Refills: 5 | Status: SHIPPED | OUTPATIENT
Start: 2022-10-18 | End: 2022-10-19 | Stop reason: SDUPTHER

## 2022-10-18 RX ORDER — GABAPENTIN 300 MG/1
300 CAPSULE ORAL 2 TIMES DAILY
Qty: 60 CAPSULE | Refills: 5 | Status: SHIPPED | OUTPATIENT
Start: 2022-10-18 | End: 2022-10-19 | Stop reason: SDUPTHER

## 2022-10-18 RX ORDER — OXYCODONE HYDROCHLORIDE 5 MG/1
5 TABLET ORAL EVERY 4 HOURS PRN
Qty: 18 TABLET | Refills: 0 | Status: SHIPPED | OUTPATIENT
Start: 2022-10-18 | End: 2022-10-19 | Stop reason: SDUPTHER

## 2022-10-19 ENCOUNTER — DOCUMENTATION (OUTPATIENT)
Dept: FAMILY MEDICINE CLINIC | Facility: CLINIC | Age: 82
End: 2022-10-19

## 2022-10-19 DIAGNOSIS — M79.7 FIBROMYALGIA: ICD-10-CM

## 2022-10-19 DIAGNOSIS — G25.81 RESTLESS LEGS SYNDROME: ICD-10-CM

## 2022-10-19 DIAGNOSIS — M19.90 IDIOPATHIC OSTEOARTHRITIS: ICD-10-CM

## 2022-10-19 DIAGNOSIS — F43.21 COMPLICATED GRIEVING: ICD-10-CM

## 2022-10-19 DIAGNOSIS — F51.01 PRIMARY INSOMNIA: ICD-10-CM

## 2022-10-19 PROBLEM — E11.9 DIABETES MELLITUS TYPE 2, CONTROLLED, WITHOUT COMPLICATIONS (HCC): Status: ACTIVE | Noted: 2022-10-19

## 2022-10-19 PROBLEM — M62.81 MUSCLE WEAKNESS: Status: ACTIVE | Noted: 2022-10-19

## 2022-10-19 PROBLEM — R26.2 DIFFICULTY WALKING: Status: ACTIVE | Noted: 2022-10-19

## 2022-10-19 PROBLEM — Z96.651 HX OF TOTAL KNEE ARTHROPLASTY, RIGHT: Status: ACTIVE | Noted: 2022-10-19

## 2022-10-19 RX ORDER — ROPINIROLE 3 MG/1
3 TABLET, FILM COATED ORAL NIGHTLY
COMMUNITY

## 2022-10-19 RX ORDER — CEFADROXIL 500 MG/1
500 CAPSULE ORAL 2 TIMES DAILY
COMMUNITY

## 2022-10-19 RX ORDER — MELOXICAM 7.5 MG/1
7.5 TABLET ORAL DAILY
COMMUNITY

## 2022-10-19 RX ORDER — DIAZEPAM 2 MG/1
2 TABLET ORAL NIGHTLY PRN
Qty: 30 TABLET | Refills: 1 | Status: SHIPPED | OUTPATIENT
Start: 2022-10-19 | End: 2022-12-07

## 2022-10-19 RX ORDER — L. ACIDOPHILUS/L.BULGARICUS 1MM CELL
1 TABLET ORAL DAILY
COMMUNITY

## 2022-10-19 RX ORDER — ASPIRIN 81 MG/1
81 TABLET, CHEWABLE ORAL DAILY
COMMUNITY

## 2022-10-19 RX ORDER — ACETAMINOPHEN 500 MG
1000 TABLET ORAL EVERY 6 HOURS PRN
COMMUNITY
End: 2023-02-24

## 2022-10-19 RX ORDER — GABAPENTIN 300 MG/1
300 CAPSULE ORAL 2 TIMES DAILY
Qty: 60 CAPSULE | Refills: 1 | Status: SHIPPED | OUTPATIENT
Start: 2022-10-19 | End: 2022-11-22

## 2022-10-19 RX ORDER — OXYCODONE HYDROCHLORIDE 5 MG/1
5 TABLET ORAL EVERY 4 HOURS PRN
Qty: 120 TABLET | Refills: 0 | Status: SHIPPED | OUTPATIENT
Start: 2022-10-19

## 2022-10-19 RX ORDER — DOCUSATE SODIUM 100 MG/1
100 CAPSULE, LIQUID FILLED ORAL 2 TIMES DAILY
COMMUNITY

## 2022-10-19 RX ORDER — ACETAMINOPHEN 500 MG
1 TABLET ORAL DAILY
COMMUNITY
End: 2023-02-24

## 2022-10-19 NOTE — TELEPHONE ENCOUNTER
(NEW ADMIT)    [facility] REQUEST[facility] REFILLS FOR GABAPENTIN 300 MG, OXYCODONE 5 MG, AND DIAZEPAM 2 MG.    DIRECTIONS: GABAPENTIN 300 MG 1 CAP PO BID.    OXYCODONE 5 MG 1 TAB  PO Q 4 HRS PRN.     DIAZEPAM 2 MG 1 PO QD PRN.

## 2022-10-26 ENCOUNTER — NURSING HOME (OUTPATIENT)
Dept: FAMILY MEDICINE CLINIC | Facility: CLINIC | Age: 82
End: 2022-10-26

## 2022-10-26 VITALS
RESPIRATION RATE: 18 BRPM | WEIGHT: 204 LBS | BODY MASS INDEX: 32.93 KG/M2 | HEART RATE: 95 BPM | TEMPERATURE: 98.8 F | DIASTOLIC BLOOD PRESSURE: 68 MMHG | OXYGEN SATURATION: 96 % | SYSTOLIC BLOOD PRESSURE: 124 MMHG

## 2022-10-26 DIAGNOSIS — Z78.9 IMPAIRED MOBILITY AND ADLS: Primary | ICD-10-CM

## 2022-10-26 DIAGNOSIS — Z74.09 IMPAIRED MOBILITY AND ADLS: Primary | ICD-10-CM

## 2022-10-26 DIAGNOSIS — I83.813 VARICOSE VEINS OF BILATERAL LOWER EXTREMITIES WITH PAIN: ICD-10-CM

## 2022-10-26 DIAGNOSIS — I10 ESSENTIAL HYPERTENSION: ICD-10-CM

## 2022-10-26 DIAGNOSIS — Z96.651 S/P TOTAL KNEE ARTHROPLASTY, RIGHT: ICD-10-CM

## 2022-10-26 DIAGNOSIS — M79.7 FIBROMYALGIA: ICD-10-CM

## 2022-10-26 DIAGNOSIS — I50.32 CHRONIC DIASTOLIC (CONGESTIVE) HEART FAILURE: ICD-10-CM

## 2022-10-26 DIAGNOSIS — E11.9 CONTROLLED TYPE 2 DIABETES MELLITUS WITHOUT COMPLICATION, WITHOUT LONG-TERM CURRENT USE OF INSULIN: ICD-10-CM

## 2022-10-26 PROCEDURE — 99309 SBSQ NF CARE MODERATE MDM 30: CPT | Performed by: FAMILY MEDICINE

## 2022-10-27 ENCOUNTER — TELEPHONE (OUTPATIENT)
Dept: FAMILY MEDICINE CLINIC | Facility: CLINIC | Age: 82
End: 2022-10-27

## 2022-11-02 PROBLEM — Z78.9 IMPAIRED MOBILITY AND ADLS: Status: ACTIVE | Noted: 2022-11-02

## 2022-11-02 PROBLEM — Z74.09 IMPAIRED MOBILITY AND ADLS: Status: ACTIVE | Noted: 2022-11-02

## 2022-11-02 NOTE — PROGRESS NOTES
Nursing Home Progress Note        Lan Hollins DO [x]  MAYUR Nixon []  [address] [place] [address]  Phone: [phone]  Fax: [phone] Vazquez Francisco MD []  Rudy Mcfarland DO []  [address] [place] [address]  Phone: [phone]  Fax: [phone]     PATIENT NAME: Seema Petit                                                                          YOB: 1940           DATE OF SERVICE: 10/26/2022  FACILITY: []  [place]  [] [facility]  []  [facility]  [x] [facility]  []  Other ______________________________________________________________________     CHIEF COMPLAINT:  Impaired mobility and ADLs/fibromyalgia/chronic diastolic CHF/hypertension/diabetes mellitus/status post right total knee arthroplasty      HISTORY OF PRESENT ILLNESS:   [x]Initial visit for coordination of rehabilitative care issues and chronic medical management of Diagnoses and all orders for this visit:    1. Impaired mobility and ADLs (Primary)    2. S/P total knee arthroplasty, right    3. Fibromyalgia    4. Controlled type 2 diabetes mellitus without complication, without long-term current use of insulin (HCC)    5. Chronic diastolic (congestive) heart failure (HCC)    6. Essential hypertension    7. Varicose veins of bilateral lower extremities with pain    Patient is an 82-year-old female who is status posthospitalization secondary to an elective right total knee arthroplasty secondary to end-stage degenerative osteoarthritis of the right knee.  She has numerous chronic comorbid conditions, as well as expected impaired mobility and ADLs, physical deconditioning secondary to chronic comorbid conditions and advanced age.  She was admitted to the skilled nursing facility here for rehabilitation and to return to presurgical level of independence, as well as expected routine orthopedic aftercare and progressive mobilization efforts.    No reports of falls or injuries since arrival to  facility.  She did experience a popping sensation to her right knee yesterday, x-rays have been ordered.  She has been able to continue participation with physical therapy, including ambulation with moderate assistance, utilizing walker for short distances as well.  Progressively improving.  Tolerating all p.o. intake, denies any chest pain, syncope, palpitations or vertigo.    Maintains good urine output, denies orthopnea.  Denies any epistaxis or hemoptysis.  Denies hematuria or BRB/BTS.  Pain well controlled.      PAST MEDICAL & SURGICAL HISTORY:   Past Medical History:   Diagnosis Date   • Angina pectoris (HCC)    • Arthritis    • CHF (congestive heart failure) (HCC)    • Cholelithiasis    • Chronic diastolic heart failure (HCC)    • GERD (gastroesophageal reflux disease)    • Gout    • Hyperlipidemia    • Infectious viral hepatitis    • Kidney disease    • RLS (restless legs syndrome)       Past Surgical History:   Procedure Laterality Date   • EYE SURGERY     • HYSTERECTOMY     • TOTAL HIP ARTHROPLASTY Bilateral    • VARICOSE VEIN SURGERY           MEDICATIONS:  I have reviewed and reconciled the patients medication list in the patients chart at the [facility] nursing [facility] today.      ALLERGIES:    Allergies   Allergen Reactions   • Quinine Derivatives Other (See Comments)     FLU LIKE SYMPTOMS         SOCIAL HISTORY:    Social History     Socioeconomic History   • Marital status:    Tobacco Use   • Smoking status: Never   • Smokeless tobacco: Never   Vaping Use   • Vaping Use: Never used   Substance and Sexual Activity   • Alcohol use: No   • Drug use: No   • Sexual activity: Defer       FAMILY HISTORY:    Family History   Problem Relation Age of Onset   • Cancer Mother    • Kidney disease Mother    • Heart attack Father    • Stroke Father    • Cancer Sister    • Heart attack Brother    • Cancer Son        REVIEW OF SYSTEMS:    Review of Systems  · Appetite: Fair []   Good [x]   Poor []   Weight Loss,  intentional [x]  []  Weight Stable   Unavoidable Weight Loss []  Tolerating Tube Feeding []    Supplements Provided []   · Constitutional: Negative for fever, chills, diaphoresis or fatigue and weight change.   · HENT: No dysphagia; no changes to vision/hearing/smell/taste; no epistaxis  · Eyes: Negative for redness and visual disturbance.   · Respiratory: Negative for shortness of breath, chest pain, cough or chest tightness.   · Cardiovascular: Negative for chest pain and palpitations.   · Gastrointestinal: Negative for abdominal distention, abdominal pain and blood in stool.   · Endocrine: Negative for cold intolerance and heat intolerance.   · Genitourinary: Negative for difficulty urinating, dysuria and frequency.Negative for hematuria   · Musculoskeletal: Chronic myalgias and arthralgias.  Expected worsened pain to right knee.  · Integumentary: No open wounds, rash or concerning skin lesions  · Neurological: Negative for syncope, weakness and headaches.   · Hematological: Negative for adenopathy. Does not bruise/bleed easily.   · Immunological: Negative for reported allergies or immunological disorders  · Psychological: No acute behavioral changes    PHYSICAL EXAMINATION:   VITAL SIGNS:   Vitals:    10/26/22 0849   BP: 124/68   Pulse: 95   Resp: 18   Temp: 98.8 °F (37.1 °C)   SpO2: 96%       Physical Exam    General Appearance:  [x]  Alert   [x]  Oriented x person  [x]  No acute distress     []  Confused  []  Disoriented   []  Comatose   Head:  Atraumatic and normocephalic, without obvious abnormality.   Eyes:         PERRLA, conjunctivae and sclerae normal, no Icterus. No pallor. Extra-occular movements are within normal limits.   Ears:  Ears appear intact with no abnormalities noted.   Throat: No oral lesions, no thrush, oral mucosa moist.   Neck: Supple, trachea midline, no thyromegaly, no carotid bruit.   Back:   No kyphoscoliosis. No tenderness to palpation.   Lungs:   Chest shape is normal.  Air exchange  noted to all lung fields.  No wheezing.    Heart:  Normal S1 and S2, no murmur, no gallop, no rub. No JVD.   Abdomen:   Normal bowel sounds, no masses, no organomegaly. Soft, non-tender, non-distended, no guarding    Extremities: Moves all extremities.  Edematous changes to bilateral lower extremities, gravity dependent worsening to the right lower extremity.  Ambulates with assistance of support and walker.   Pulses: Pulses palpable and equal bilaterally.   Skin:  Postsurgical scarring noted to the anterior right knee, clean dry incision, well approximated edges.  No streaking erythema.  Generalized dry skin noted.  Age-related atrophy of skin.   Neurologic: [x] Normal speech []  Normal mental status    [x] Cranial nerves II through XII intact   [x]  No anosmia [x]  DTR 2+ [x]  Proprioception intact  [x]  No focal motor/sensory deficits      Psych/Mood:                    [x]  No acute changes []  Depressed  Urinary:                            [x]  Continent  []  Incontinent []  Retention  []  F/C      []  UTI w/treatment in progress         ASSESSMENT     Diagnoses and all orders for this visit:    1. Impaired mobility and ADLs (Primary)    2. S/P total knee arthroplasty, right    3. Fibromyalgia    4. Controlled type 2 diabetes mellitus without complication, without long-term current use of insulin (HCC)    5. Chronic diastolic (congestive) heart failure (HCC)    6. Essential hypertension    7. Varicose veins of bilateral lower extremities with pain          PLAN  Continue routine orthopedic aftercare.  Fall precautions in place.  Pain appears clinically well controlled.  Keep scheduled follow-up appoint with orthopedic surgery.  Plan to follow-up x-rays of right knee that were ordered secondary to popping sensation.    Vital signs demonstrate hemodynamic stability.  Blood pressure is at goal.  Demonstrates no findings of acute volume overload.  Continue frequent weight evaluations.  Continue low-sodium/salt  dietary intake as able.    Continue to follow nutritional/hydration status closely.    Continue to monitor blood glucose, adjustments to her treatment regimen in an effort to maximize her blood glucose control, as well as minimize hypoglycemic episodes.    Continue PT/OT.    Surveillance labs when needed.    [x]  Discussed Patient in detail with nursing/staff, addressed all needs today.     [x]  Plan of Care Reviewed   []  PT/OT Reviewed   []  Order Changes  []  Discharge Plans Reviewed   []  Code Status Changes    I spent 35 minutes caring for [place] on this date of service. This time includes time spent by me in the following activities:preparing for the visit, reviewing tests, obtaining and/or reviewing a separately obtained history, performing a medically appropriate examination and/or evaluation , counseling and educating the patient/family/caregiver, ordering medications, tests, or procedures, documenting information in the medical record and care coordination       Lan Hollins DO  10/26/2022

## 2022-11-18 DIAGNOSIS — G25.81 RESTLESS LEGS SYNDROME: ICD-10-CM

## 2022-11-18 NOTE — TELEPHONE ENCOUNTER
Rx Refill Note  Requested Prescriptions     Pending Prescriptions Disp Refills   • gabapentin (NEURONTIN) 300 MG capsule [Pharmacy Med Name: gabapentin 300 mg capsule] 60 capsule 1     Sig: TAKE ONE CAPSULE BY MOUTH TWICE DAILY      Last office visit with prescribing clinician: 5/9/2022      Next office visit with prescribing clinician: Visit date not found            Eveline Pitts MA  11/18/22, 10:45 EST

## 2022-11-22 RX ORDER — GABAPENTIN 300 MG/1
CAPSULE ORAL
Qty: 60 CAPSULE | Refills: 1 | Status: SHIPPED | OUTPATIENT
Start: 2022-11-22 | End: 2023-01-23

## 2022-12-03 DIAGNOSIS — F51.01 PRIMARY INSOMNIA: ICD-10-CM

## 2022-12-03 DIAGNOSIS — F43.21 COMPLICATED GRIEVING: ICD-10-CM

## 2022-12-05 NOTE — TELEPHONE ENCOUNTER
Rx Refill Note  Requested Prescriptions     Pending Prescriptions Disp Refills   • diazePAM (VALIUM) 2 MG tablet [Pharmacy Med Name: diazepam 2 mg tablet] 30 tablet 1     Sig: TAKE ONE TABLET BY MOUTH EVERY NIGHT AS NEEDED FOR INSOMNIA      Last office visit with prescribing clinician: 5/9/2022   Last telemedicine visit with prescribing clinician: Visit date not found   Next office visit with prescribing clinician: Visit date not found                         Would you like a call back once the refill request has been completed: [] Yes [] No    If the office needs to give you a call back, can they leave a voicemail: [] Yes [] No    Eveline Pitts MA  12/05/22, 09:00 EST

## 2022-12-07 RX ORDER — DIAZEPAM 2 MG/1
TABLET ORAL
Qty: 30 TABLET | Refills: 1 | Status: SHIPPED | OUTPATIENT
Start: 2022-12-07 | End: 2023-03-06

## 2022-12-26 DIAGNOSIS — M25.561 BILATERAL CHRONIC KNEE PAIN: ICD-10-CM

## 2022-12-26 DIAGNOSIS — I83.813 VARICOSE VEINS OF BILATERAL LOWER EXTREMITIES WITH PAIN: ICD-10-CM

## 2022-12-26 DIAGNOSIS — I50.32 CHRONIC DIASTOLIC (CONGESTIVE) HEART FAILURE: ICD-10-CM

## 2022-12-26 DIAGNOSIS — G89.29 BILATERAL CHRONIC KNEE PAIN: ICD-10-CM

## 2022-12-26 DIAGNOSIS — M25.562 ARTHRALGIA OF BOTH KNEES: ICD-10-CM

## 2022-12-26 DIAGNOSIS — I10 ESSENTIAL HYPERTENSION: ICD-10-CM

## 2022-12-26 DIAGNOSIS — M25.561 ARTHRALGIA OF BOTH KNEES: ICD-10-CM

## 2022-12-26 DIAGNOSIS — M25.562 BILATERAL CHRONIC KNEE PAIN: ICD-10-CM

## 2022-12-27 RX ORDER — FUROSEMIDE 20 MG/1
TABLET ORAL
Qty: 90 TABLET | Refills: 1 | Status: SHIPPED | OUTPATIENT
Start: 2022-12-27

## 2022-12-27 RX ORDER — HYDROCODONE BITARTRATE AND ACETAMINOPHEN 5; 325 MG/1; MG/1
TABLET ORAL
Qty: 30 TABLET | Refills: 0 | Status: SHIPPED | OUTPATIENT
Start: 2022-12-27 | End: 2023-02-24

## 2022-12-27 NOTE — TELEPHONE ENCOUNTER
Rx Refill Note  Requested Prescriptions     Pending Prescriptions Disp Refills   • HYDROcodone-acetaminophen (NORCO) 5-325 MG per tablet [Pharmacy Med Name: hydrocodone 5 mg-acetaminophen 325 mg tablet] 30 tablet 0     Sig: TAKE ONE TABLET BY MOUTH EVERY 6 HOURS AS NEEDED FOR PAIN      Last office visit with prescribing clinician: 8/25/2022   Last telemedicine visit with prescribing clinician: 12/26/2022   Next office visit with prescribing clinician: Visit date not found                         Would you like a call back once the refill request has been completed: [] Yes [] No    If the office needs to give you a call back, can they leave a voicemail: [] Yes [] No    Adilia Vallejo MA  12/27/22, 08:11 EST

## 2022-12-30 DIAGNOSIS — I10 ESSENTIAL HYPERTENSION: ICD-10-CM

## 2022-12-30 DIAGNOSIS — I83.813 VARICOSE VEINS OF BILATERAL LOWER EXTREMITIES WITH PAIN: ICD-10-CM

## 2022-12-30 DIAGNOSIS — I50.32 CHRONIC DIASTOLIC (CONGESTIVE) HEART FAILURE: ICD-10-CM

## 2022-12-30 RX ORDER — POTASSIUM CHLORIDE 750 MG/1
TABLET, FILM COATED, EXTENDED RELEASE ORAL
Qty: 90 TABLET | Refills: 1 | Status: SHIPPED | OUTPATIENT
Start: 2022-12-30

## 2023-01-20 DIAGNOSIS — G25.81 RESTLESS LEGS SYNDROME: ICD-10-CM

## 2023-01-20 NOTE — TELEPHONE ENCOUNTER
Rx Refill Note  Requested Prescriptions     Pending Prescriptions Disp Refills   • gabapentin (NEURONTIN) 300 MG capsule [Pharmacy Med Name: gabapentin 300 mg capsule] 60 capsule 1     Sig: TAKE ONE CAPSULE BY MOUTH TWICE DAILY      Last office visit with prescribing clinician: 5/9/2022   Last telemedicine visit with prescribing clinician: Visit date not found   Next office visit with prescribing clinician: Visit date not found                         Would you like a call back once the refill request has been completed: [] Yes [] No    If the office needs to give you a call back, can they leave a voicemail: [] Yes [] No    Eveline Pitts MA  01/20/23, 08:54 EST

## 2023-01-23 RX ORDER — GABAPENTIN 300 MG/1
CAPSULE ORAL
Qty: 60 CAPSULE | Refills: 2 | Status: SHIPPED | OUTPATIENT
Start: 2023-01-23 | End: 2023-03-09 | Stop reason: SDUPTHER

## 2023-02-24 ENCOUNTER — NURSING HOME (OUTPATIENT)
Dept: FAMILY MEDICINE CLINIC | Facility: CLINIC | Age: 83
End: 2023-02-24
Payer: MEDICARE

## 2023-02-24 ENCOUNTER — DOCUMENTATION (OUTPATIENT)
Dept: FAMILY MEDICINE CLINIC | Facility: CLINIC | Age: 83
End: 2023-02-24
Payer: MEDICARE

## 2023-02-24 VITALS
OXYGEN SATURATION: 97 % | HEART RATE: 76 BPM | DIASTOLIC BLOOD PRESSURE: 70 MMHG | BODY MASS INDEX: 34.7 KG/M2 | RESPIRATION RATE: 18 BRPM | TEMPERATURE: 97.4 F | WEIGHT: 215 LBS | SYSTOLIC BLOOD PRESSURE: 120 MMHG

## 2023-02-24 DIAGNOSIS — M25.469 KNEE SWELLING: ICD-10-CM

## 2023-02-24 DIAGNOSIS — Z78.9 IMPAIRED MOBILITY AND ADLS: ICD-10-CM

## 2023-02-24 DIAGNOSIS — Z96.652 STATUS POST TOTAL LEFT KNEE REPLACEMENT: ICD-10-CM

## 2023-02-24 DIAGNOSIS — T14.8XXA BRUISING: ICD-10-CM

## 2023-02-24 DIAGNOSIS — Z74.09 IMPAIRED MOBILITY AND ADLS: ICD-10-CM

## 2023-02-24 PROCEDURE — 99309 SBSQ NF CARE MODERATE MDM 30: CPT | Performed by: NURSE PRACTITIONER

## 2023-02-24 RX ORDER — ONDANSETRON 4 MG/1
4 TABLET, FILM COATED ORAL EVERY 6 HOURS PRN
COMMUNITY

## 2023-02-24 RX ORDER — OXYCODONE HYDROCHLORIDE AND ACETAMINOPHEN 5; 325 MG/1; MG/1
1 TABLET ORAL EVERY 4 HOURS PRN
COMMUNITY
End: 2023-03-09 | Stop reason: SDUPTHER

## 2023-02-27 NOTE — PROGRESS NOTES
"Nursing Home Follow Up Note      Lan Hollins DO []   MAYUR Nixon [x]  [address] [place] [address]  Phone: [phone]  Fax: [phone] Vazquez Francisco MD []    Rudy Mcfarland DO []   [address] [place] [address]  Phone: [phone]  Fax: [phone]     PATIENT NAME: Seema Petit                                                                          YOB: 1940           DATE OF SERVICE: 02/24/2023  FACILITY:  [][place]   [] [place]   [] [facility]   [x] [facility]   [] Other ______________________________________________________________________      CHIEF COMPLAINT:    Medication and chart review.      HISTORY OF PRESENT ILLNESS:     83 yo female with PMH CHF, GERD, HTN, HLD, gout, arthritis, RLS and CKD. She was admitted to facility for rehabilitation and strengthening status post left knee arthroplasty for arthritis. She did not work with therapy today,\"worn out and knee hurting too bad\". Per daughter however, she had her son take her home earlier \"to do laundry\". She has not been using ice pad and is not wearing TARIK hose but is agreeable to these. She has no other complaints today, is resting in bed with no signs and symptoms of distress. Does report pain to right knee. She is concerned about bruising to posterior knee but appears normal and per daughter she had this bruising with right knee arthroplasty.     PAST MEDICAL & SURGICAL HISTORY:   Past Medical History:   Diagnosis Date   • Angina pectoris (HCC)    • Arthritis    • Cancer (HCC)    • CHF (congestive heart failure) (HCC)    • Cholelithiasis    • Chronic diastolic heart failure (HCC)    • GERD (gastroesophageal reflux disease)    • Gout    • Hyperlipidemia    • Hypertension    • Infectious viral hepatitis    • Kidney disease    • Mitral valve prolapse    • RLS (restless legs syndrome)       Past Surgical History:   Procedure Laterality Date   • ABDOMINAL SURGERY     • EYE SURGERY     • " HYSTERECTOMY     • JOINT REPLACEMENT     • KNEE ARTHROPLASTY Right 10/17/2022   • TOTAL HIP ARTHROPLASTY Bilateral    • TOTAL KNEE ARTHROPLASTY Left 02/2023   • VARICOSE VEIN SURGERY           MEDICATIONS:  I have reviewed and reconciled the patients medication list in the patients chart at the skilled nursing facility today.      ALLERGIES:    Allergies   Allergen Reactions   • Quinine Derivatives Other (See Comments)     FLU LIKE SYMPTOMS         SOCIAL HISTORY:    Social History     Socioeconomic History   • Marital status:    Tobacco Use   • Smoking status: Never   • Smokeless tobacco: Never   Vaping Use   • Vaping Use: Never used   Substance and Sexual Activity   • Alcohol use: No   • Drug use: No   • Sexual activity: Defer       FAMILY HISTORY:    Family History   Problem Relation Age of Onset   • Cancer Mother    • Kidney disease Mother    • Heart attack Father    • Stroke Father    • Cancer Sister    • Heart attack Brother    • Cancer Son        REVIEW OF SYSTEMS:    Review of Systems   Constitutional: Negative for activity change, appetite change, chills, diaphoresis, fatigue, fever, unexpected weight gain and unexpected weight loss.   HENT: Negative for congestion, mouth sores, nosebleeds, sore throat and trouble swallowing.    Eyes: Negative for discharge, redness and itching.   Respiratory: Negative for apnea, cough, choking, chest tightness, shortness of breath and wheezing.    Cardiovascular: Negative for chest pain, palpitations and leg swelling.   Gastrointestinal: Negative for abdominal distention, abdominal pain, blood in stool, constipation, diarrhea, nausea, vomiting, GERD and indigestion.   Genitourinary: Negative for decreased urine volume, difficulty urinating, dysuria, flank pain, frequency, hematuria, urgency and urinary incontinence.   Musculoskeletal: Positive for arthralgias, gait problem and joint swelling. Negative for back pain and myalgias.        Right knee arthroplasty    Skin: Positive for bruise (posterior left knee). Negative for color change, dry skin, rash and skin lesions.        Dressing intact right knee, status post arthroplasty.    Neurological: Positive for memory problem. Negative for dizziness, weakness and confusion.   Psychiatric/Behavioral: Negative for behavioral problems, dysphoric mood, hallucinations, sleep disturbance and depressed mood. The patient is not nervous/anxious.          PHYSICAL EXAMINATION:   VITAL SIGNS:   Vitals:    02/24/23 1547   BP: 120/70   Pulse: 76   Resp: 18   Temp: 97.4 °F (36.3 °C)   SpO2: 97%   Weight: 97.5 kg (215 lb)       Physical Exam  Vitals and nursing note reviewed.   Constitutional:       Appearance: She is well-developed.   HENT:      Head: Normocephalic.      Right Ear: External ear normal.      Left Ear: External ear normal.      Nose: Nose normal.   Eyes:      Conjunctiva/sclera: Conjunctivae normal.   Cardiovascular:      Rate and Rhythm: Normal rate and regular rhythm.      Heart sounds: Normal heart sounds.   Pulmonary:      Effort: Pulmonary effort is normal. No respiratory distress.      Breath sounds: Normal breath sounds. No wheezing or rales.   Abdominal:      General: Bowel sounds are normal. There is no distension.      Palpations: Abdomen is soft.      Tenderness: There is no abdominal tenderness.   Musculoskeletal:      Cervical back: Normal range of motion.      Left knee: Swelling and ecchymosis present. Decreased range of motion. Tenderness present.        Legs:    Skin:     General: Skin is warm and dry.      Findings: No rash.   Neurological:      Mental Status: She is alert.   Psychiatric:         Behavior: Behavior normal.         RECORDS REVIEW:   I have reviewed and interpreted the records in [facility]    ASSESSMENT     Diagnoses and all orders for this visit:    1. Status post total left knee replacement    2. Bruising    3. Knee swelling    4. Impaired mobility and ADLs        PLAN    Status post left knee  arthroplasty/left knee swelling and bruising  -Nursing to apply ice to knee 3-4 times per day for at least 20 minutes each time. TARIK hose to BLE 21 hours per day per Ortho. Nursing not to remove dressing until she follows up with Ortho in 1 week. If dressing becomes soiled or loose, nursing to contact Ortho for further instructions. Will have nursing contact Ortho about bruising, what is normal and abnormal, per patient concerns. Will follow up and continue to monitor. Admission labs pending.     Patient was encouraged to keep me informed of any acute changes, lack of improvement, or any new concerning symptoms. Patient voiced understanding of all instructions and denied further questions.    Continue supportive care for all ADLs.     [x]  Discussed Patient in detail with nursing/staff, addressed all needs today.     [x]  Plan of Care Reviewed   [x]  PT/OT Reviewed   [x]  Order Changes  [x]  Discharge Plans Reviewed  [x]  Advance Directive on file with Nursing Home.   [x]  POA on file with Nursing Home.   [x]  Code Status listed: [x]  Full Code   []  DNR         Inga Wesley, MAYUR.

## 2023-03-01 ENCOUNTER — NURSING HOME (OUTPATIENT)
Dept: FAMILY MEDICINE CLINIC | Facility: CLINIC | Age: 83
End: 2023-03-01
Payer: MEDICARE

## 2023-03-01 VITALS
WEIGHT: 210 LBS | OXYGEN SATURATION: 98 % | TEMPERATURE: 98.6 F | SYSTOLIC BLOOD PRESSURE: 126 MMHG | HEART RATE: 82 BPM | DIASTOLIC BLOOD PRESSURE: 78 MMHG | RESPIRATION RATE: 18 BRPM | BODY MASS INDEX: 33.89 KG/M2

## 2023-03-01 DIAGNOSIS — G47.33 OBSTRUCTIVE SLEEP APNEA ON CPAP: ICD-10-CM

## 2023-03-01 DIAGNOSIS — Z96.652 S/P TOTAL KNEE ARTHROPLASTY, LEFT: ICD-10-CM

## 2023-03-01 DIAGNOSIS — I50.32 CHRONIC DIASTOLIC (CONGESTIVE) HEART FAILURE: ICD-10-CM

## 2023-03-01 DIAGNOSIS — E11.9 CONTROLLED TYPE 2 DIABETES MELLITUS WITHOUT COMPLICATION, WITHOUT LONG-TERM CURRENT USE OF INSULIN: ICD-10-CM

## 2023-03-01 DIAGNOSIS — Z74.09 IMPAIRED MOBILITY AND ADLS: Primary | ICD-10-CM

## 2023-03-01 DIAGNOSIS — G25.81 RESTLESS LEGS SYNDROME: ICD-10-CM

## 2023-03-01 DIAGNOSIS — I10 ESSENTIAL HYPERTENSION: ICD-10-CM

## 2023-03-01 DIAGNOSIS — M17.12 PRIMARY OSTEOARTHRITIS OF LEFT KNEE: ICD-10-CM

## 2023-03-01 DIAGNOSIS — F51.01 PRIMARY INSOMNIA: ICD-10-CM

## 2023-03-01 DIAGNOSIS — Z78.9 IMPAIRED MOBILITY AND ADLS: Primary | ICD-10-CM

## 2023-03-01 DIAGNOSIS — M79.7 FIBROMYALGIA: ICD-10-CM

## 2023-03-05 DIAGNOSIS — F51.01 PRIMARY INSOMNIA: ICD-10-CM

## 2023-03-05 DIAGNOSIS — F43.21 COMPLICATED GRIEVING: ICD-10-CM

## 2023-03-06 RX ORDER — DIAZEPAM 2 MG/1
TABLET ORAL
Qty: 30 TABLET | Refills: 1 | Status: SHIPPED | OUTPATIENT
Start: 2023-03-06 | End: 2023-03-09 | Stop reason: SDUPTHER

## 2023-03-09 ENCOUNTER — NURSING HOME (OUTPATIENT)
Dept: FAMILY MEDICINE CLINIC | Facility: CLINIC | Age: 83
End: 2023-03-09
Payer: MEDICARE

## 2023-03-09 VITALS
OXYGEN SATURATION: 97 % | BODY MASS INDEX: 33.57 KG/M2 | TEMPERATURE: 98.6 F | DIASTOLIC BLOOD PRESSURE: 78 MMHG | RESPIRATION RATE: 18 BRPM | SYSTOLIC BLOOD PRESSURE: 126 MMHG | HEART RATE: 82 BPM | WEIGHT: 208 LBS

## 2023-03-09 DIAGNOSIS — M79.7 FIBROMYALGIA: ICD-10-CM

## 2023-03-09 DIAGNOSIS — Z99.89 OBSTRUCTIVE SLEEP APNEA ON CPAP: Primary | ICD-10-CM

## 2023-03-09 DIAGNOSIS — E53.8 VITAMIN B12 DEFICIENCY: ICD-10-CM

## 2023-03-09 DIAGNOSIS — Z96.652 S/P TOTAL KNEE ARTHROPLASTY, LEFT: ICD-10-CM

## 2023-03-09 DIAGNOSIS — E11.9 CONTROLLED TYPE 2 DIABETES MELLITUS WITHOUT COMPLICATION, WITHOUT LONG-TERM CURRENT USE OF INSULIN: ICD-10-CM

## 2023-03-09 DIAGNOSIS — M19.90 IDIOPATHIC OSTEOARTHRITIS: ICD-10-CM

## 2023-03-09 DIAGNOSIS — I10 ESSENTIAL HYPERTENSION: ICD-10-CM

## 2023-03-09 DIAGNOSIS — I50.32 CHRONIC DIASTOLIC (CONGESTIVE) HEART FAILURE: ICD-10-CM

## 2023-03-09 DIAGNOSIS — F51.01 PRIMARY INSOMNIA: ICD-10-CM

## 2023-03-09 DIAGNOSIS — G25.81 RESTLESS LEGS SYNDROME: ICD-10-CM

## 2023-03-09 DIAGNOSIS — F43.21 COMPLICATED GRIEVING: ICD-10-CM

## 2023-03-09 DIAGNOSIS — G47.33 OBSTRUCTIVE SLEEP APNEA ON CPAP: Primary | ICD-10-CM

## 2023-03-09 PROCEDURE — 99316 NF DSCHRG MGMT 30 MIN+: CPT | Performed by: NURSE PRACTITIONER

## 2023-03-09 RX ORDER — GABAPENTIN 300 MG/1
300 CAPSULE ORAL 2 TIMES DAILY
Qty: 60 CAPSULE | Refills: 0 | Status: SHIPPED | OUTPATIENT
Start: 2023-03-09 | End: 2023-04-08

## 2023-03-09 RX ORDER — DIAZEPAM 2 MG/1
2 TABLET ORAL NIGHTLY PRN
Qty: 30 TABLET | Refills: 0 | Status: SHIPPED | OUTPATIENT
Start: 2023-03-09 | End: 2023-04-08

## 2023-03-09 RX ORDER — OXYCODONE HYDROCHLORIDE AND ACETAMINOPHEN 5; 325 MG/1; MG/1
1 TABLET ORAL EVERY 4 HOURS PRN
Qty: 18 TABLET | Refills: 0 | Status: SHIPPED | OUTPATIENT
Start: 2023-03-09

## 2023-03-10 NOTE — PROGRESS NOTES
Nursing Home Follow Up Note      Lan Hollins DO []   MAYUR Nixon [x]  [address] [place] [address]  Phone: [phone]  Fax: [phone] Vazquez Francisco MD []    Rudy Mcfarland DO []   [address] [place] [address]  Phone: [phone]  Fax: [phone]     PATIENT NAME: Seema Petit                                                                          YOB: 1940           DATE OF SERVICE: 03/9/2023  FACILITY:  [][facility]   [] [facility]   [] [facility]   [x] [facility]   [] Other ______________________________________________________________________      CHIEF COMPLAINT:    Discharging home tomorrow.       HISTORY OF PRESENT ILLNESS:     81 yo female with PMH CHF, GERD, HTN, HLD, gout, arthritis, RLS and CKD. She was admitted to facility for rehabilitation and strengthening status post left knee arthroplasty for arthritis. She has progressed very well with therapy. She has been able to leave facility with family to go home and participate in activities. She will continue to follow up with Ortho and continue therapy exercises at home. She will continue to follow up with her PCP for management of chronic conditions. She has no complaints or concerns today. She has been walking about the facility with her walker.     PAST MEDICAL & SURGICAL HISTORY:   Past Medical History:   Diagnosis Date   • Angina pectoris (HCC)    • Arthritis    • Cancer (HCC)    • CHF (congestive heart failure) (HCC)    • Cholelithiasis    • Chronic diastolic heart failure (HCC)    • GERD (gastroesophageal reflux disease)    • Gout    • Hyperlipidemia    • Hypertension    • Infectious viral hepatitis    • Kidney disease    • Mitral valve prolapse    • RLS (restless legs syndrome)       Past Surgical History:   Procedure Laterality Date   • ABDOMINAL SURGERY     • EYE SURGERY     • HYSTERECTOMY     • JOINT REPLACEMENT     • KNEE ARTHROPLASTY Right 10/17/2022   • TOTAL HIP ARTHROPLASTY  Bilateral    • TOTAL KNEE ARTHROPLASTY Left 02/2023   • VARICOSE VEIN SURGERY           MEDICATIONS:  I have reviewed and reconciled the patients medication list in the patients chart at the skilled nursing facility today.      ALLERGIES:    Allergies   Allergen Reactions   • Quinine Derivatives Other (See Comments)     FLU LIKE SYMPTOMS         SOCIAL HISTORY:    Social History     Socioeconomic History   • Marital status:    Tobacco Use   • Smoking status: Never   • Smokeless tobacco: Never   Vaping Use   • Vaping Use: Never used   Substance and Sexual Activity   • Alcohol use: No   • Drug use: No   • Sexual activity: Defer       FAMILY HISTORY:    Family History   Problem Relation Age of Onset   • Cancer Mother    • Kidney disease Mother    • Heart attack Father    • Stroke Father    • Cancer Sister    • Heart attack Brother    • Cancer Son        REVIEW OF SYSTEMS:    Review of Systems   Constitutional: Negative for activity change, appetite change, chills, diaphoresis, fatigue, fever, unexpected weight gain and unexpected weight loss.   HENT: Negative for congestion, mouth sores, nosebleeds, sore throat and trouble swallowing.    Eyes: Negative for discharge, redness and itching.   Respiratory: Negative for apnea, cough, choking, chest tightness, shortness of breath and wheezing.    Cardiovascular: Negative for chest pain, palpitations and leg swelling.   Gastrointestinal: Negative for abdominal distention, abdominal pain, blood in stool, constipation, diarrhea, nausea, vomiting, GERD and indigestion.   Genitourinary: Negative for decreased urine volume, difficulty urinating, dysuria, flank pain, frequency, hematuria, urgency and urinary incontinence.   Musculoskeletal: Positive for arthralgias, gait problem and joint swelling. Negative for back pain and myalgias.        Right knee arthroplasty   Skin: Negative for color change, dry skin, rash, skin lesions and bruise.   Neurological: Positive for memory  problem. Negative for dizziness, weakness and confusion.   Psychiatric/Behavioral: Negative for behavioral problems, dysphoric mood, hallucinations, sleep disturbance and depressed mood. The patient is not nervous/anxious.          PHYSICAL EXAMINATION:   VITAL SIGNS:   Vitals:    03/09/23 1212   BP: 126/78   Pulse: 82   Resp: 18   Temp: 98.6 °F (37 °C)   SpO2: 97%   Weight: 94.3 kg (208 lb)       Physical Exam  Vitals and nursing note reviewed.   Constitutional:       Appearance: She is well-developed.   HENT:      Head: Normocephalic.      Right Ear: External ear normal.      Left Ear: External ear normal.      Nose: Nose normal.   Eyes:      Conjunctiva/sclera: Conjunctivae normal.   Cardiovascular:      Rate and Rhythm: Normal rate and regular rhythm.      Heart sounds: Normal heart sounds.   Pulmonary:      Effort: Pulmonary effort is normal. No respiratory distress.      Breath sounds: Normal breath sounds. No wheezing or rales.   Abdominal:      General: Bowel sounds are normal. There is no distension.      Palpations: Abdomen is soft.      Tenderness: There is no abdominal tenderness.   Musculoskeletal:      Cervical back: Normal range of motion.      Left knee: Swelling and ecchymosis present. Decreased range of motion. Tenderness present.        Legs:    Skin:     General: Skin is warm and dry.      Findings: No rash.   Neurological:      Mental Status: She is alert.   Psychiatric:         Behavior: Behavior normal.         RECORDS REVIEW:   I have reviewed and interpreted the records in [facility]    ASSESSMENT     Diagnoses and all orders for this visit:    1. Obstructive sleep apnea on CPAP (Primary)    2. S/P total knee arthroplasty, left    3. Primary insomnia    4. Restless legs syndrome    5. Controlled type 2 diabetes mellitus without complication, without long-term current use of insulin (HCC)    6. Essential hypertension    7. Chronic diastolic (congestive) heart failure (HCC)    8. Vitamin B12  deficiency        PLAN    Status post left knee arthroplasty/left knee swelling and bruising  -She will continue to follow Orth as directed. She will continue therapy exercises at home.     HTN/CHF/DM/CLIVE/B12 def/RLS  -She will continue to follow up with PCP for management of chronic conditions. Stable during her admission to facility.     Patient was encouraged to keep me informed of any acute changes, lack of improvement, or any new concerning symptoms. Patient voiced understanding of all instructions and denied further questions.    Continue supportive care for all ADLs.     [x]  Discussed Patient in detail with nursing/staff, addressed all needs today.     [x]  Plan of Care Reviewed   [x]  PT/OT Reviewed   [x]  Order Changes  [x]  Discharge Plans Reviewed  [x]  Advance Directive on file with Nursing Home.   [x]  POA on file with Nursing Home.   [x]  Code Status listed: [x]  Full Code   []  DNR     “I confirm accuracy of unchanged data/findings which have been carried forward from previous visit, as well as I have updated appropriately those that have changed.”      I spent 45 minutes on discharge for [place] on this date of service. This time includes time spent by me in the following activities:preparing for the visit, reviewing tests, obtaining and/or reviewing a separately obtained history, performing a medically appropriate examination and/or evaluation , counseling and educating the patient/family/caregiver, ordering medications, tests, or procedures, referring and communicating with other health care professionals , documenting information in the medical record, independently interpreting results and communicating that information with the patient/family/caregiver and care coordination      MAYUR Perez.

## 2023-04-28 DIAGNOSIS — I50.32 CHRONIC DIASTOLIC (CONGESTIVE) HEART FAILURE: ICD-10-CM

## 2023-04-28 DIAGNOSIS — M17.12 PRIMARY OSTEOARTHRITIS OF LEFT KNEE: ICD-10-CM

## 2023-04-28 DIAGNOSIS — I83.813 VARICOSE VEINS OF BILATERAL LOWER EXTREMITIES WITH PAIN: ICD-10-CM

## 2023-04-28 DIAGNOSIS — I10 ESSENTIAL HYPERTENSION: ICD-10-CM

## 2023-04-28 DIAGNOSIS — G25.81 RESTLESS LEGS SYNDROME: ICD-10-CM

## 2023-04-28 DIAGNOSIS — M25.562 ARTHRALGIA OF LEFT KNEE: ICD-10-CM

## 2023-04-28 DIAGNOSIS — F43.21 COMPLICATED GRIEVING: ICD-10-CM

## 2023-04-28 DIAGNOSIS — M25.512 ARTHRALGIA OF LEFT SHOULDER REGION: ICD-10-CM

## 2023-04-28 RX ORDER — GABAPENTIN 300 MG/1
300 CAPSULE ORAL 2 TIMES DAILY
Qty: 60 CAPSULE | Refills: 0 | Status: SHIPPED | OUTPATIENT
Start: 2023-04-28 | End: 2023-05-28

## 2023-04-28 RX ORDER — POTASSIUM CHLORIDE 750 MG/1
10 TABLET, FILM COATED, EXTENDED RELEASE ORAL DAILY
Qty: 90 TABLET | Refills: 1 | Status: SHIPPED | OUTPATIENT
Start: 2023-04-28

## 2023-04-28 RX ORDER — DULOXETIN HYDROCHLORIDE 30 MG/1
30 CAPSULE, DELAYED RELEASE ORAL DAILY
Qty: 90 CAPSULE | Refills: 2 | Status: SHIPPED | OUTPATIENT
Start: 2023-04-28

## 2023-05-09 DIAGNOSIS — I10 ESSENTIAL HYPERTENSION: ICD-10-CM

## 2023-05-09 DIAGNOSIS — M17.12 PRIMARY OSTEOARTHRITIS OF LEFT KNEE: ICD-10-CM

## 2023-05-09 DIAGNOSIS — I50.32 CHRONIC DIASTOLIC (CONGESTIVE) HEART FAILURE: ICD-10-CM

## 2023-05-09 DIAGNOSIS — I83.813 VARICOSE VEINS OF BILATERAL LOWER EXTREMITIES WITH PAIN: ICD-10-CM

## 2023-05-09 DIAGNOSIS — M25.562 ARTHRALGIA OF LEFT KNEE: ICD-10-CM

## 2023-05-09 DIAGNOSIS — M25.512 ARTHRALGIA OF LEFT SHOULDER REGION: ICD-10-CM

## 2023-05-09 RX ORDER — FUROSEMIDE 20 MG/1
20 TABLET ORAL DAILY
Qty: 90 TABLET | Refills: 1 | Status: SHIPPED | OUTPATIENT
Start: 2023-05-09

## 2023-09-14 DIAGNOSIS — G25.81 RESTLESS LEGS SYNDROME: ICD-10-CM

## 2023-09-14 RX ORDER — PRAMIPEXOLE DIHYDROCHLORIDE 0.25 MG/1
TABLET ORAL
Qty: 30 TABLET | Refills: 0 | Status: SHIPPED | OUTPATIENT
Start: 2023-09-14

## 2023-09-27 DIAGNOSIS — M25.562 ARTHRALGIA OF LEFT KNEE: ICD-10-CM

## 2023-09-27 DIAGNOSIS — I50.32 CHRONIC DIASTOLIC (CONGESTIVE) HEART FAILURE: ICD-10-CM

## 2023-09-27 DIAGNOSIS — M17.12 PRIMARY OSTEOARTHRITIS OF LEFT KNEE: ICD-10-CM

## 2023-09-27 DIAGNOSIS — I83.813 VARICOSE VEINS OF BILATERAL LOWER EXTREMITIES WITH PAIN: ICD-10-CM

## 2023-09-27 DIAGNOSIS — M25.512 ARTHRALGIA OF LEFT SHOULDER REGION: ICD-10-CM

## 2023-09-27 DIAGNOSIS — I10 ESSENTIAL HYPERTENSION: ICD-10-CM

## 2023-09-27 RX ORDER — FUROSEMIDE 20 MG/1
TABLET ORAL
Qty: 90 TABLET | OUTPATIENT
Start: 2023-09-27

## 2023-09-27 RX ORDER — POTASSIUM CHLORIDE 750 MG/1
TABLET, EXTENDED RELEASE ORAL
Qty: 90 TABLET | OUTPATIENT
Start: 2023-09-27

## 2023-10-02 NOTE — PROGRESS NOTES
Nursing Home Progress Note        Lan Hollins DO [x]  MAYUR Nixon []  [address] [place] [address]  Phone: [phone]  Fax: [phone] Vazquez Francisco MD []  Rudy Mcfarland DO []  [address] [place] [address]  Phone: [phone]  Fax: [phone]     PATIENT NAME: Seema Petit                                                                          YOB: 1940           DATE OF SERVICE: 3/1/2023  FACILITY: []  [facility]  [] [facility]  []  [facility]  [x] [facility]  []  Other ______________________________________________________________________     CHIEF COMPLAINT:  Impaired mobility and ADLs/fibromyalgia/chronic diastolic CHF/hypertension/diabetes mellitus/status post left total knee arthroplasty      HISTORY OF PRESENT ILLNESS:   [x]Initial visit for coordination of rehabilitative care issues and chronic medical management of Diagnoses and all orders for this visit:    1. Impaired mobility and ADLs (Primary)    2. Chronic diastolic (congestive) heart failure    3. Controlled type 2 diabetes mellitus without complication, without long-term current use of insulin    4. Fibromyalgia    5. Restless legs syndrome    6. Primary insomnia    7. Obstructive sleep apnea on CPAP    8. Essential hypertension    9. Primary osteoarthritis of left knee    10. S/P total knee arthroplasty, left    Patient is an 83-year-old female who is status postelective left total knee arthroplasty.  She has done well since surgery, has tolerated PT/OT without complication.    Patient has done well since right total knee arthroplasty several months ago.  She feels she is progressing faster with this surgery than the last.  Pain has been clinically well controlled.    Vital signs have been stable.    No reports of cyclical/persistent hypoglycemia.      PAST MEDICAL & SURGICAL HISTORY:   Past Medical History:   Diagnosis Date    Angina pectoris     Arthritis     Cancer     CHF  (congestive heart failure)     Cholelithiasis     Chronic diastolic heart failure     GERD (gastroesophageal reflux disease)     Gout     Hyperlipidemia     Hypertension     Infectious viral hepatitis     Kidney disease     Mitral valve prolapse     RLS (restless legs syndrome)       Past Surgical History:   Procedure Laterality Date    ABDOMINAL SURGERY      EYE SURGERY      HYSTERECTOMY      JOINT REPLACEMENT      KNEE ARTHROPLASTY Right 10/17/2022    TOTAL HIP ARTHROPLASTY Bilateral     TOTAL KNEE ARTHROPLASTY Left 02/2023    VARICOSE VEIN SURGERY           MEDICATIONS:  I have reviewed and reconciled the patients medication list in the patients chart at the skilled nursing facility today.      ALLERGIES:    Allergies   Allergen Reactions    Quinine Derivatives Other (See Comments)     FLU LIKE SYMPTOMS         SOCIAL HISTORY:    Social History     Socioeconomic History    Marital status:    Tobacco Use    Smoking status: Never    Smokeless tobacco: Never   Vaping Use    Vaping Use: Never used   Substance and Sexual Activity    Alcohol use: No    Drug use: No    Sexual activity: Defer       FAMILY HISTORY:    Family History   Problem Relation Age of Onset    Cancer Mother     Kidney disease Mother     Heart attack Father     Stroke Father     Cancer Sister     Heart attack Brother     Cancer Son        REVIEW OF SYSTEMS:    Review of Systems  Appetite: Fair []   Good [x]   Poor []   Weight Loss, intentional [x]  []  Weight Stable   Unavoidable Weight Loss []  Tolerating Tube Feeding []    Supplements Provided []   Constitutional: Negative for fever, chills, diaphoresis or fatigue and weight change.   HENT: No dysphagia; no changes to vision/hearing/smell/taste; no epistaxis  Eyes: Negative for redness and visual disturbance.   Respiratory: Negative for shortness of breath, chest pain, cough or chest tightness.   Cardiovascular: Negative for chest pain and palpitations.   Gastrointestinal: Negative for  abdominal distention, abdominal pain and blood in stool.   Endocrine: Negative for cold intolerance and heat intolerance.   Genitourinary: Negative for difficulty urinating, dysuria and frequency.Negative for hematuria   Musculoskeletal: Chronic myalgias and arthralgias.  Expected discomfort to left knee.  Integumentary: Clean and dry incision to left knee.  Neurological: Negative for syncope, weakness and headaches.   Hematological: Negative for adenopathy. Does not bruise/bleed easily.   Immunological: Negative for reported allergies or immunological disorders  Psychological: No acute behavioral changes    PHYSICAL EXAMINATION:   VITAL SIGNS:   Vitals:    03/01/23 0935   BP: 126/78   Pulse: 82   Resp: 18   Temp: 98.6 °F (37 °C)   SpO2: 98%       Physical Exam    General Appearance:  [x]  Alert   [x]  Oriented x person  [x]  No acute distress     []  Confused  []  Disoriented   []  Comatose   Head:  Atraumatic and normocephalic, without obvious abnormality.   Eyes:         PERRLA, conjunctivae and sclerae normal, no Icterus. No pallor. Extra-occular movements are within normal limits.   Ears:  Ears appear intact with no abnormalities noted.   Throat: No oral lesions, no thrush, oral mucosa moist.   Neck: Supple, trachea midline, no thyromegaly, no carotid bruit.   Back:   No kyphoscoliosis. No tenderness to palpation.   Lungs:   Chest shape is normal.  Air exchange noted to all lung fields.  No wheezing.    Heart:  Normal S1 and S2, no murmur, no gallop, no rub. No JVD.   Abdomen:   Normal bowel sounds, no masses, no organomegaly. Soft, non-tender, non-distended, no guarding    Extremities: Moves all extremities.  Edematous changes to bilateral lower extremities, gravity dependent worsening to the left lower extremity.  Ambulates with assistance of support and walker.   Pulses: Pulses palpable and equal bilaterally.   Skin:  Postsurgical scarring noted to the anterior right knee, clean dry incision, well  approximated edges.  No streaking erythema.  Generalized dry skin noted.  Age-related atrophy of skin.  Expected surgical incision to left knee, clean and dry.  Wound edges well approximated.   Neurologic: [x] Normal speech []  Normal mental status    [x] Cranial nerves II through XII intact   [x]  No anosmia [x]  DTR 2+ [x]  Proprioception intact  [x]  No focal motor/sensory deficits      Psych/Mood:                    [x]  No acute changes []  Depressed  Urinary:                            [x]  Continent  []  Incontinent []  Retention  []  F/C      []  UTI w/treatment in progress         ASSESSMENT     Diagnoses and all orders for this visit:    1. Impaired mobility and ADLs (Primary)    2. Chronic diastolic (congestive) heart failure    3. Controlled type 2 diabetes mellitus without complication, without long-term current use of insulin    4. Fibromyalgia    5. Restless legs syndrome    6. Primary insomnia    7. Obstructive sleep apnea on CPAP    8. Essential hypertension    9. Primary osteoarthritis of left knee    10. S/P total knee arthroplasty, left          PLAN  Continue PT/OT.  Pain appears clinically well controlled.  Fall precautions in place.  Continue to follow her nutritional/hydration status.  No deficits reported at this time.  Keep scheduled follow-up appointment with orthopedic surgery.    Vital signs demonstrate hemodynamic stability.  Continue frequent weight evaluations, utilize low-sodium/salt dietary intake as best able.  Maintain appropriate hydration status.  Avoid prolonged fasting periods as best able.    Surveillance labs when needed.    [x]  Discussed Patient in detail with nursing/staff, addressed all needs today.     [x]  Plan of Care Reviewed   [x]  PT/OT Reviewed   []  Order Changes  [x]  Discharge Plans Reviewed   []  Code Status Changes    I spent 30 minutes caring for [place] on this date of service. This time includes time spent by me in the following activities:preparing for  the visit, reviewing tests, performing a medically appropriate examination and/or evaluation , counseling and educating the patient/family/caregiver, ordering medications, tests, or procedures, documenting information in the medical record, and care coordination    I have reviewed and updated all copied forward information, as appropriate.  I attest to the accuracy and relevance of any unchanged information.       Lan Hollins DO  3/1/2023

## 2023-10-09 DIAGNOSIS — G25.81 RESTLESS LEGS SYNDROME: ICD-10-CM

## 2023-10-09 NOTE — TELEPHONE ENCOUNTER
Rx Refill Note  Requested Prescriptions     Pending Prescriptions Disp Refills    pramipexole (MIRAPEX) 0.25 MG tablet [Pharmacy Med Name: pramipexole 0.25 mg tablet] 30 tablet 0     Sig: TAKE ONE TABLET BY MOUTH AT BEDTIME      Last office visit with prescribing clinician: 5/9/2022   Last telemedicine visit with prescribing clinician: Visit date not found   Next office visit with prescribing clinician: Visit date not found       Debora Veloz MA  10/09/23, 17:33 EDT

## 2023-10-10 DIAGNOSIS — G25.81 RESTLESS LEGS SYNDROME: ICD-10-CM

## 2023-10-10 DIAGNOSIS — F43.21 COMPLICATED GRIEVING: ICD-10-CM

## 2023-10-10 RX ORDER — PRAMIPEXOLE DIHYDROCHLORIDE 0.25 MG/1
TABLET ORAL
Qty: 30 TABLET | Refills: 0 | Status: SHIPPED | OUTPATIENT
Start: 2023-10-10

## 2023-10-10 RX ORDER — DULOXETIN HYDROCHLORIDE 30 MG/1
30 CAPSULE, DELAYED RELEASE ORAL DAILY
Qty: 90 CAPSULE | Refills: 10 | Status: SHIPPED | OUTPATIENT
Start: 2023-10-10

## 2023-10-11 DIAGNOSIS — I83.813 VARICOSE VEINS OF BILATERAL LOWER EXTREMITIES WITH PAIN: ICD-10-CM

## 2023-10-11 DIAGNOSIS — I50.32 CHRONIC DIASTOLIC (CONGESTIVE) HEART FAILURE: ICD-10-CM

## 2023-10-11 DIAGNOSIS — M25.512 ARTHRALGIA OF LEFT SHOULDER REGION: ICD-10-CM

## 2023-10-11 DIAGNOSIS — M25.562 ARTHRALGIA OF LEFT KNEE: ICD-10-CM

## 2023-10-11 DIAGNOSIS — M17.12 PRIMARY OSTEOARTHRITIS OF LEFT KNEE: ICD-10-CM

## 2023-10-11 DIAGNOSIS — I10 ESSENTIAL HYPERTENSION: ICD-10-CM

## 2023-10-11 RX ORDER — FUROSEMIDE 20 MG/1
20 TABLET ORAL DAILY
Qty: 90 TABLET | Refills: 0 | Status: SHIPPED | OUTPATIENT
Start: 2023-10-11

## 2023-10-11 RX ORDER — POTASSIUM CHLORIDE 750 MG/1
10 TABLET, FILM COATED, EXTENDED RELEASE ORAL DAILY
Qty: 90 TABLET | Refills: 0 | Status: SHIPPED | OUTPATIENT
Start: 2023-10-11

## 2023-10-19 DIAGNOSIS — F43.21 COMPLICATED GRIEVING: ICD-10-CM

## 2023-10-19 DIAGNOSIS — G25.81 RESTLESS LEGS SYNDROME: ICD-10-CM

## 2023-10-19 RX ORDER — DULOXETIN HYDROCHLORIDE 30 MG/1
30 CAPSULE, DELAYED RELEASE ORAL DAILY
Qty: 90 CAPSULE | Refills: 1 | Status: SHIPPED | OUTPATIENT
Start: 2023-10-19

## 2023-10-19 RX ORDER — PRAMIPEXOLE DIHYDROCHLORIDE 0.25 MG/1
0.25 TABLET ORAL
Qty: 90 TABLET | Refills: 1 | Status: SHIPPED | OUTPATIENT
Start: 2023-10-19

## 2023-11-30 ENCOUNTER — OFFICE VISIT (OUTPATIENT)
Dept: FAMILY MEDICINE CLINIC | Facility: CLINIC | Age: 83
End: 2023-11-30
Payer: MEDICARE

## 2023-11-30 VITALS
OXYGEN SATURATION: 95 % | HEIGHT: 66 IN | HEART RATE: 67 BPM | SYSTOLIC BLOOD PRESSURE: 125 MMHG | WEIGHT: 190 LBS | DIASTOLIC BLOOD PRESSURE: 85 MMHG | BODY MASS INDEX: 30.53 KG/M2

## 2023-11-30 DIAGNOSIS — M25.562 ARTHRALGIA OF LEFT KNEE: ICD-10-CM

## 2023-11-30 DIAGNOSIS — K40.90 INGUINAL HERNIA OF LEFT SIDE WITHOUT OBSTRUCTION OR GANGRENE: Primary | ICD-10-CM

## 2023-11-30 DIAGNOSIS — K59.00 CONSTIPATION, UNSPECIFIED CONSTIPATION TYPE: ICD-10-CM

## 2023-11-30 RX ORDER — TRAZODONE HYDROCHLORIDE 50 MG/1
50 TABLET ORAL NIGHTLY
COMMUNITY

## 2023-11-30 NOTE — PROGRESS NOTES
"    Office Note     Name: Seema Petit  : 1940   MRN: [id number]     Chief Complaint:  Mass (Pt sts she has mass \"hand\" cup size on left side), Back Pain (Pt sts when walking it pulls at lower back), and Knee Pain (Pt sts sometimes when moving left leg it gets stuck and she has to move it back into joint)    Subjective     History of Present Illness:  Seema Petit is a 83 y.o. female who presents today for abdominal mass.  She reports that earlier this week she noticed mass on left side of abdomen when she was in the shower.  She described as hard and occasionally tender.  No redness noted.  Denies any falls or injuries to the area.    Also reports left knee pain.  This has been ongoing for the last 5 or 6 months.  She had left TKA done earlier this year and in the course of PT she felt a pop and extreme pain.  X-ray was done but was nondiagnostic.  Patient has not followed up with orthopedics since then or done any physical therapy since then.  The pain is not allowing her to walk without a cane.           I have reviewed the following portions of the patient's history and these were updated and discussed with the patient as appropriate: past family history, past medical history, past social history, past surgical history, and problem list.     Objective     Vital Signs  /85   Pulse 67   Ht 167.6 cm (66\")   Wt 86.2 kg (190 lb)   SpO2 95%   BMI 30.67 kg/m²   Estimated body mass index is 30.67 kg/m² as calculated from the following:    Height as of this encounter: 167.6 cm (66\").    Weight as of this encounter: 86.2 kg (190 lb).    Physical Exam  Vitals reviewed.   Constitutional:       General: She is not in acute distress.     Appearance: Normal appearance. She is not ill-appearing.   HENT:      Head: Normocephalic.   Eyes:      Extraocular Movements: Extraocular movements intact.   Cardiovascular:      Rate and Rhythm: Normal rate.   Pulmonary:      Effort: Pulmonary effort is " normal.      Breath sounds: Normal breath sounds.   Abdominal:      General: Bowel sounds are normal. There is no distension.      Palpations: Abdomen is soft.      Tenderness: There is no abdominal tenderness. There is no guarding or rebound.      Hernia: A hernia is present.       Neurological:      Mental Status: She is alert and oriented to person, place, and time.   Psychiatric:         Mood and Affect: Mood normal.         Behavior: Behavior normal.                      Assessment and Plan     Diagnoses and all orders for this visit:    1. Inguinal hernia of left side without obstruction or gangrene (Primary)  -     Ambulatory Referral to General Surgery    2. Arthralgia of left knee    3. Constipation, unspecified constipation type    Patient has inguinal hernia on the left side without obstruction or gangrene.  Hernia is incarcerated but not strangulated.  Referral to general surgery  Patient has arthralgia of the left knee causing her to not be able to walk.  This is after her left TKA done earlier this year.  She has not followed up with orthopedic surgery or with physical therapy.  I advised her to follow-up with her orthopedic surgeon Dr. TERRAZAS or his PA, Collins Taveras.  Hernia likely 2/2 constipation.  Patient advised to start fiber supplementation with Metamucil and to increase water intake.  I advised her to also obtain OTC stool softeners so that stools are soft and more frequent and she would not exert as much pressure with bowel movements.         Discussion Summary:     Discussed plan of care in detail with patient today. Patient was encouraged to keep me informed of any acute changes, lack of improvement, or any new concerning symptoms.  Patient is also aware of reasons to seek emergent care. Patient verbalized understanding and agrees with plan of care.    This visit was billed based on time.  I spent 40 minutes caring for Seema Petit on this date of service. This time includes time spent by  me in the following activities:preparing for the visit, reviewing tests, obtaining and/or reviewing a separately obtained history, performing a medically appropriate examination and/or evaluation , counseling and educating the patient/family/caregiver, ordering medications, tests, or procedures, referring and communicating with other health care professionals , documenting information in the medical record, independently interpreting results and communicating that information with the patient/family/caregiver, and care coordination.    Follow Up  Return if symptoms worsen or fail to improve.    At [facility], we believe that sharing information builds trust and better relationships. You are receiving this note because you recently visited [facility]. It is possible you will see health information before a provider has talked with you about it. This kind of information can be easy to misunderstand. To help you fully understand what it means for your health, we urge you to discuss this note with your provider.    Los Barroso MD, MPH  [facility] [place] [facility] [place]

## 2023-12-01 PROBLEM — K59.00 CONSTIPATION: Status: ACTIVE | Noted: 2023-12-01

## 2023-12-20 NOTE — H&P (VIEW-ONLY)
Patient: Seema Petit    YOB: 1940    Date: 12/21/2023    Primary Care Provider: Lan Hollins DO    Chief Complaint   Patient presents with    Hernia     Left inguinal hernia       SUBJECTIVE:    History of present illness:  I saw the patient in the office today as a consultation for evaluation and treatment of a left inguinal hernia.    Apparently she has felt a mass in the left groin over the past week.  This gets larger with prolonged standing and any lifting.  It does cause her some discomfort that is crampy and dull in nature certainly worse with pressure.    She is scheduled to have a repeat left knee replacement performed at the end of January of next year.  She denies a history significant for previous myocardial infarction or stroke.    The following portions of the patient's history were reviewed and updated as appropriate: allergies, current medications, past family history, past medical history, past social history, past surgical history and problem list.    Review of Systems   Constitutional:  Negative for chills, fever and unexpected weight change.   HENT:  Negative for hearing loss, trouble swallowing and voice change.    Eyes:  Negative for visual disturbance.   Respiratory:  Negative for apnea, cough, chest tightness, shortness of breath and wheezing.    Cardiovascular:  Negative for chest pain, palpitations and leg swelling.   Gastrointestinal:  Negative for abdominal distention, abdominal pain, anal bleeding, blood in stool, constipation, diarrhea, nausea, rectal pain and vomiting.   Endocrine: Negative for cold intolerance and heat intolerance.   Genitourinary:  Negative for difficulty urinating, dysuria and flank pain.   Musculoskeletal:  Negative for back pain and gait problem.   Skin:  Negative for color change, rash and wound.   Neurological:  Negative for dizziness, syncope, speech difficulty, weakness, light-headedness, numbness and headaches.   Hematological:   Negative for adenopathy. Does not bruise/bleed easily.   Psychiatric/Behavioral:  Negative for confusion. The patient is not nervous/anxious.        History:  Past Medical History:   Diagnosis Date    Angina pectoris     Arthritis     Cancer     CHF (congestive heart failure)     Cholelithiasis     Chronic diastolic heart failure     GERD (gastroesophageal reflux disease)     Gout     Hyperlipidemia     Hypertension     Infectious viral hepatitis     Kidney disease     Mitral valve prolapse     RLS (restless legs syndrome)        Past Surgical History:   Procedure Laterality Date    ABDOMINAL SURGERY      EYE SURGERY      HYSTERECTOMY      JOINT REPLACEMENT      KNEE ARTHROPLASTY Right 10/17/2022    TOTAL HIP ARTHROPLASTY Bilateral     TOTAL KNEE ARTHROPLASTY Left 02/2023    VARICOSE VEIN SURGERY         Family History   Problem Relation Age of Onset    Cancer Mother     Kidney disease Mother     Heart attack Father     Stroke Father     Cancer Sister     Heart attack Brother     Cancer Son        Social History     Tobacco Use    Smoking status: Never    Smokeless tobacco: Never   Vaping Use    Vaping Use: Never used   Substance Use Topics    Alcohol use: No    Drug use: No       Allergies:  Allergies   Allergen Reactions    Gabapentin (Once-Daily) Hallucinations    Quinine Derivatives Other (See Comments)     FLU LIKE SYMPTOMS       Medications:    Current Outpatient Medications:     aspirin 81 MG chewable tablet, Chew 1 tablet Daily., Disp: , Rfl:     diclofenac (VOLTAREN) 50 MG EC tablet, Take 1 tablet by mouth 2 (Two) Times a Day., Disp: 180 tablet, Rfl: 0    DULoxetine (CYMBALTA) 30 MG capsule, Take 1 capsule by mouth Daily., Disp: 90 capsule, Rfl: 1    furosemide (LASIX) 20 MG tablet, Take 1 tablet by mouth Daily., Disp: 90 tablet, Rfl: 0    MAGNESIUM-OXIDE PO, Take  by mouth., Disp: , Rfl:     potassium chloride 10 MEQ CR tablet, Take 1 tablet by mouth Daily., Disp: 90 tablet, Rfl: 0    pramipexole (MIRAPEX)  "0.25 MG tablet, Take 1 tablet by mouth every night at bedtime., Disp: 90 tablet, Rfl: 1    rOPINIRole (REQUIP) 3 MG tablet, Take 1 tablet by mouth Every Night., Disp: , Rfl:     traZODone (DESYREL) 50 MG tablet, Take 1 tablet by mouth Every Night., Disp: , Rfl:     vitamin B-6 (PYRIDOXINE) 50 MG tablet, Take 1 tablet by mouth Daily., Disp: , Rfl:     gabapentin (NEURONTIN) 300 MG capsule, TAKE 1 CAPSULE TWICE DAILY (Patient not taking: Reported on 12/21/2023), Disp: 60 capsule, Rfl: 2    OBJECTIVE:    Vital Signs:   Vitals:    12/21/23 1456   BP: 140/82   Pulse: 73   Temp: 98.4 °F (36.9 °C)   TempSrc: Temporal   SpO2: 97%   Weight: 87.8 kg (193 lb 9.6 oz)   Height: 167.6 cm (66\")       Physical Exam:   General Appearance:    Alert, cooperative, in no acute distress   Head:    Normocephalic, without obvious abnormality, atraumatic   Eyes:            Lids and lashes normal, conjunctivae and sclerae normal, no   icterus, no pallor, corneas clear, PERRLA   Ears:    Ears appear intact with no abnormalities noted   Throat:   No oral lesions, no thrush, oral mucosa moist   Neck:   No adenopathy, supple, trachea midline, no thyromegaly, no   carotid bruit, no JVD   Lungs:     Clear to auscultation,respirations regular, even and                  unlabored    Heart:    Regular rhythm and normal rate, normal S1 and S2, no            murmur   Abdomen:     no masses, no organomegaly, soft non-tender, non-distended, no guarding, there is evidence of a palpable mass in the left inguinal region partially reducible   Extremities:   Moves all extremities well, no edema, no cyanosis, no             redness   Pulses:   Pulses palpable and equal bilaterally   Skin:   No bleeding, bruising or rash   Lymph nodes:   No palpable adenopathy   Neurologic:   Cranial nerves 2 - 12 grossly intact, sensation intact        Results Review:   I reviewed the patient's new clinical results.  I reviewed the patient's new imaging results and agree with " the interpretation.  I reviewed the patient's other test results and agree with the interpretation    Review of Systems was reviewed and confirmed as accurate as documented by the MA.    ASSESSMENT/PLAN:    1. Inguinal hernia without obstruction or gangrene, recurrence not specified, unspecified laterality    2. Incarcerated hernia        I had a detailed and extensive discussion with the patient in the office and they understand that they need to undergo robotic assisted laparoscopic left hernia repair with mesh.  Full risks and benefits of operative versus nonoperative intervention were discussed with the patient and these included things such as nonresolution of symptoms and possible worsening of symptoms without surgical intervention versus infection, bleeding, possible recurrent hernia, possible postoperative neuralgia from nerve damage or involvement with scar tissue, etc.  The patient understands, agrees, and had no questions for me at the end of the office visit.     I discussed the patients findings and my recommendations with patient.    We did perform an ultrasound today in the office and this did show a fairly large inguinal hernia on the left side that contained small bowel.  Because of this she needs to undergo robotic laparoscopic assisted inguinal herniorrhaphy with mesh implantation.    Electronically signed by Krunal Chu MD  12/21/23

## 2023-12-21 ENCOUNTER — OFFICE VISIT (OUTPATIENT)
Dept: SURGERY | Facility: CLINIC | Age: 83
End: 2023-12-21
Payer: MEDICARE

## 2023-12-21 VITALS
OXYGEN SATURATION: 97 % | SYSTOLIC BLOOD PRESSURE: 140 MMHG | DIASTOLIC BLOOD PRESSURE: 82 MMHG | WEIGHT: 193.6 LBS | BODY MASS INDEX: 31.12 KG/M2 | TEMPERATURE: 98.4 F | HEIGHT: 66 IN | HEART RATE: 73 BPM

## 2023-12-21 DIAGNOSIS — K46.0 INCARCERATED HERNIA: ICD-10-CM

## 2023-12-21 DIAGNOSIS — K40.90 INGUINAL HERNIA WITHOUT OBSTRUCTION OR GANGRENE, RECURRENCE NOT SPECIFIED, UNSPECIFIED LATERALITY: Primary | ICD-10-CM

## 2023-12-21 PROCEDURE — 1159F MED LIST DOCD IN RCRD: CPT | Performed by: SURGERY

## 2023-12-21 PROCEDURE — 1160F RVW MEDS BY RX/DR IN RCRD: CPT | Performed by: SURGERY

## 2023-12-21 PROCEDURE — 99204 OFFICE O/P NEW MOD 45 MIN: CPT | Performed by: SURGERY

## 2023-12-21 PROCEDURE — 3079F DIAST BP 80-89 MM HG: CPT | Performed by: SURGERY

## 2023-12-21 PROCEDURE — 3077F SYST BP >= 140 MM HG: CPT | Performed by: SURGERY

## 2023-12-21 RX ORDER — LANOLIN ALCOHOL/MO/W.PET/CERES
50 CREAM (GRAM) TOPICAL DAILY
COMMUNITY

## 2023-12-22 ENCOUNTER — PRE-ADMISSION TESTING (OUTPATIENT)
Dept: PREADMISSION TESTING | Facility: HOSPITAL | Age: 83
End: 2023-12-22
Payer: MEDICARE

## 2023-12-22 DIAGNOSIS — M25.512 ARTHRALGIA OF LEFT SHOULDER REGION: ICD-10-CM

## 2023-12-22 DIAGNOSIS — M25.562 ARTHRALGIA OF LEFT KNEE: ICD-10-CM

## 2023-12-22 DIAGNOSIS — I10 ESSENTIAL HYPERTENSION: ICD-10-CM

## 2023-12-22 DIAGNOSIS — I83.813 VARICOSE VEINS OF BILATERAL LOWER EXTREMITIES WITH PAIN: ICD-10-CM

## 2023-12-22 DIAGNOSIS — I50.32 CHRONIC DIASTOLIC (CONGESTIVE) HEART FAILURE: ICD-10-CM

## 2023-12-22 DIAGNOSIS — M17.12 PRIMARY OSTEOARTHRITIS OF LEFT KNEE: ICD-10-CM

## 2023-12-22 LAB
ANION GAP SERPL CALCULATED.3IONS-SCNC: 10.6 MMOL/L (ref 5–15)
BUN SERPL-MCNC: 26 MG/DL (ref 8–23)
BUN/CREAT SERPL: 26.3 (ref 7–25)
CALCIUM SPEC-SCNC: 10 MG/DL (ref 8.6–10.5)
CHLORIDE SERPL-SCNC: 102 MMOL/L (ref 98–107)
CO2 SERPL-SCNC: 29.4 MMOL/L (ref 22–29)
CREAT SERPL-MCNC: 0.99 MG/DL (ref 0.57–1)
DEPRECATED RDW RBC AUTO: 45.8 FL (ref 37–54)
EGFRCR SERPLBLD CKD-EPI 2021: 57.4 ML/MIN/1.73
ERYTHROCYTE [DISTWIDTH] IN BLOOD BY AUTOMATED COUNT: 12.8 % (ref 12.3–15.4)
GLUCOSE SERPL-MCNC: 100 MG/DL (ref 65–99)
HCT VFR BLD AUTO: 41.8 % (ref 34–46.6)
HGB BLD-MCNC: 13.5 G/DL (ref 12–15.9)
MCH RBC QN AUTO: 31.7 PG (ref 26.6–33)
MCHC RBC AUTO-ENTMCNC: 32.3 G/DL (ref 31.5–35.7)
MCV RBC AUTO: 98.1 FL (ref 79–97)
PLATELET # BLD AUTO: 290 10*3/MM3 (ref 140–450)
PMV BLD AUTO: 11.1 FL (ref 6–12)
POTASSIUM SERPL-SCNC: 4.6 MMOL/L (ref 3.5–5.2)
RBC # BLD AUTO: 4.26 10*6/MM3 (ref 3.77–5.28)
SODIUM SERPL-SCNC: 142 MMOL/L (ref 136–145)
WBC NRBC COR # BLD AUTO: 7.38 10*3/MM3 (ref 3.4–10.8)

## 2023-12-22 PROCEDURE — 36415 COLL VENOUS BLD VENIPUNCTURE: CPT

## 2023-12-22 PROCEDURE — 93005 ELECTROCARDIOGRAM TRACING: CPT

## 2023-12-22 PROCEDURE — 80048 BASIC METABOLIC PNL TOTAL CA: CPT

## 2023-12-22 PROCEDURE — 85027 COMPLETE CBC AUTOMATED: CPT

## 2023-12-22 RX ORDER — FUROSEMIDE 20 MG/1
20 TABLET ORAL DAILY
Qty: 90 TABLET | Refills: 3 | Status: SHIPPED | OUTPATIENT
Start: 2023-12-22

## 2023-12-22 RX ORDER — POTASSIUM CHLORIDE 750 MG/1
10 TABLET, EXTENDED RELEASE ORAL DAILY
Qty: 90 TABLET | Refills: 3 | Status: SHIPPED | OUTPATIENT
Start: 2023-12-22

## 2023-12-22 NOTE — DISCHARGE INSTRUCTIONS
Pre-Admission testing appointment completed today for patient's upcoming procedure at [facility].    PAT PASS reviewed with patient and they verbalize understanding of the following:     Do not eat or drink anything after midnight the night before procedure unless otherwise instructed by physician/surgeon's office, this includes no gum, candy, mints, tobacco products or e-cigarettes.  Do not shave the area to be operated on at least 48 hours prior to procedure.  Do not wear makeup, lotion, hair products, or nail polish.  Do not wear any jewelry and remove all piercings.  Do not wear any adhesive if you wear dentures.  Do not wear contacts; bring in glasses if needed.  Only take medications on the morning of procedure as instructed by PAT nurse per anesthesia guidelines or as instructed by physician's office.  If you are on any blood thinners reach out to the physician/surgeon's office for instructions on when/if they will need to be stopped prior to procedure.  Bring in picture ID and insurance card, advanced directive copies if applicable, CPAP/BIPAP/Inhalers if indicated morning of procedure, leave any other valuables at home.  Ensure you have arranged for someone to drive you home the day of your procedure and someone to care for you at home afterwards. It is recommended that you do not drive, drink alcohol, or make any major legal decisions for at least 24 hours after your procedure is complete.  Chlorhexadine wipes along with instruction/information sheet given to patient if indicated. Instructed patient to date, time, and initial the verification sheet once skin prep has been  completed, and to return to [facility] the day of the procedure.     Introduction to anesthesia video watched by patient during appointment.  Instructions and information given to patient about parking, hospital entrance, and registration location.

## 2023-12-23 LAB
QT INTERVAL: 424 MS
QTC INTERVAL: 433 MS

## 2023-12-26 ENCOUNTER — HOSPITAL ENCOUNTER (OUTPATIENT)
Facility: HOSPITAL | Age: 83
Setting detail: HOSPITAL OUTPATIENT SURGERY
Discharge: HOME | End: 2023-12-26
Attending: SURGERY | Admitting: SURGERY
Payer: MEDICARE

## 2023-12-26 ENCOUNTER — ANESTHESIA EVENT (OUTPATIENT)
Dept: PERIOP | Facility: HOSPITAL | Age: 83
End: 2023-12-26
Payer: MEDICARE

## 2023-12-26 ENCOUNTER — ANESTHESIA (OUTPATIENT)
Dept: PERIOP | Facility: HOSPITAL | Age: 83
End: 2023-12-26
Payer: MEDICARE

## 2023-12-26 VITALS
SYSTOLIC BLOOD PRESSURE: 113 MMHG | HEART RATE: 73 BPM | RESPIRATION RATE: 18 BRPM | OXYGEN SATURATION: 95 % | TEMPERATURE: 97.4 F | DIASTOLIC BLOOD PRESSURE: 66 MMHG

## 2023-12-26 DIAGNOSIS — K40.90 NON-RECURRENT UNILATERAL INGUINAL HERNIA WITHOUT OBSTRUCTION OR GANGRENE: Primary | ICD-10-CM

## 2023-12-26 DIAGNOSIS — K40.90 INGUINAL HERNIA WITHOUT OBSTRUCTION OR GANGRENE, RECURRENCE NOT SPECIFIED, UNSPECIFIED LATERALITY: ICD-10-CM

## 2023-12-26 DIAGNOSIS — K46.0 INCARCERATED HERNIA: ICD-10-CM

## 2023-12-26 PROCEDURE — C1781 MESH (IMPLANTABLE): HCPCS | Performed by: SURGERY

## 2023-12-26 PROCEDURE — 25010000002 PROPOFOL 10 MG/ML EMULSION: Performed by: NURSE ANESTHETIST, CERTIFIED REGISTERED

## 2023-12-26 PROCEDURE — 25010000002 KETOROLAC TROMETHAMINE PER 15 MG: Performed by: NURSE ANESTHETIST, CERTIFIED REGISTERED

## 2023-12-26 PROCEDURE — 25010000002 ONDANSETRON PER 1 MG: Performed by: NURSE ANESTHETIST, CERTIFIED REGISTERED

## 2023-12-26 PROCEDURE — 25010000002 FENTANYL CITRATE PF 50 MCG/ML SOLUTION PREFILLED SYRINGE: Performed by: NURSE ANESTHETIST, CERTIFIED REGISTERED

## 2023-12-26 PROCEDURE — 25810000003 LACTATED RINGERS PER 1000 ML: Performed by: SURGERY

## 2023-12-26 PROCEDURE — 25010000002 SUGAMMADEX 200 MG/2ML SOLUTION: Performed by: NURSE ANESTHETIST, CERTIFIED REGISTERED

## 2023-12-26 PROCEDURE — 25010000002 CEFAZOLIN SODIUM-DEXTROSE 2-3 GM-%(50ML) RECONSTITUTED SOLUTION: Performed by: SURGERY

## 2023-12-26 PROCEDURE — 25010000002 DEXAMETHASONE PER 1 MG: Performed by: NURSE ANESTHETIST, CERTIFIED REGISTERED

## 2023-12-26 RX ORDER — DEXAMETHASONE SODIUM PHOSPHATE 4 MG/ML
INJECTION, SOLUTION INTRA-ARTICULAR; INTRALESIONAL; INTRAMUSCULAR; INTRAVENOUS; SOFT TISSUE AS NEEDED
Status: DISCONTINUED | OUTPATIENT
Start: 2023-12-26 | End: 2023-12-26 | Stop reason: SURG

## 2023-12-26 RX ORDER — FENTANYL CITRATE 50 UG/ML
INJECTION, SOLUTION INTRAMUSCULAR; INTRAVENOUS AS NEEDED
Status: DISCONTINUED | OUTPATIENT
Start: 2023-12-26 | End: 2023-12-26 | Stop reason: SURG

## 2023-12-26 RX ORDER — BUPIVACAINE HYDROCHLORIDE AND EPINEPHRINE 5; 5 MG/ML; UG/ML
INJECTION, SOLUTION EPIDURAL; INTRACAUDAL; PERINEURAL AS NEEDED
Status: DISCONTINUED | OUTPATIENT
Start: 2023-12-26 | End: 2023-12-26 | Stop reason: HOSPADM

## 2023-12-26 RX ORDER — ONDANSETRON 2 MG/ML
4 INJECTION INTRAMUSCULAR; INTRAVENOUS ONCE AS NEEDED
Status: DISCONTINUED | OUTPATIENT
Start: 2023-12-26 | End: 2023-12-26 | Stop reason: HOSPADM

## 2023-12-26 RX ORDER — CEFAZOLIN SODIUM 2 G/50ML
2000 SOLUTION INTRAVENOUS ONCE
Status: COMPLETED | OUTPATIENT
Start: 2023-12-26 | End: 2023-12-26

## 2023-12-26 RX ORDER — MAGNESIUM HYDROXIDE 1200 MG/15ML
LIQUID ORAL AS NEEDED
Status: DISCONTINUED | OUTPATIENT
Start: 2023-12-26 | End: 2023-12-26 | Stop reason: HOSPADM

## 2023-12-26 RX ORDER — ONDANSETRON 2 MG/ML
INJECTION INTRAMUSCULAR; INTRAVENOUS AS NEEDED
Status: DISCONTINUED | OUTPATIENT
Start: 2023-12-26 | End: 2023-12-26 | Stop reason: SURG

## 2023-12-26 RX ORDER — ROCURONIUM BROMIDE 50 MG/5 ML
SYRINGE (ML) INTRAVENOUS AS NEEDED
Status: DISCONTINUED | OUTPATIENT
Start: 2023-12-26 | End: 2023-12-26 | Stop reason: SURG

## 2023-12-26 RX ORDER — HYDROCODONE BITARTRATE AND ACETAMINOPHEN 7.5; 325 MG/1; MG/1
1 TABLET ORAL EVERY 6 HOURS PRN
Qty: 15 TABLET | Refills: 0 | Status: SHIPPED | OUTPATIENT
Start: 2023-12-26

## 2023-12-26 RX ORDER — KETOROLAC TROMETHAMINE 30 MG/ML
INJECTION, SOLUTION INTRAMUSCULAR; INTRAVENOUS AS NEEDED
Status: DISCONTINUED | OUTPATIENT
Start: 2023-12-26 | End: 2023-12-26 | Stop reason: SURG

## 2023-12-26 RX ORDER — SODIUM CHLORIDE, SODIUM LACTATE, POTASSIUM CHLORIDE, CALCIUM CHLORIDE 600; 310; 30; 20 MG/100ML; MG/100ML; MG/100ML; MG/100ML
1000 INJECTION, SOLUTION INTRAVENOUS CONTINUOUS
Status: DISCONTINUED | OUTPATIENT
Start: 2023-12-26 | End: 2023-12-26 | Stop reason: HOSPADM

## 2023-12-26 RX ORDER — PROPOFOL 10 MG/ML
VIAL (ML) INTRAVENOUS AS NEEDED
Status: DISCONTINUED | OUTPATIENT
Start: 2023-12-26 | End: 2023-12-26 | Stop reason: SURG

## 2023-12-26 RX ADMIN — Medication 50 MG: at 09:22

## 2023-12-26 RX ADMIN — FENTANYL CITRATE 50 MCG: 50 INJECTION, SOLUTION INTRAMUSCULAR; INTRAVENOUS at 09:22

## 2023-12-26 RX ADMIN — LIDOCAINE HYDROCHLORIDE 60 MG: 20 INJECTION, SOLUTION INTRAVENOUS at 09:22

## 2023-12-26 RX ADMIN — PROPOFOL 150 MG: 10 INJECTION, EMULSION INTRAVENOUS at 09:22

## 2023-12-26 RX ADMIN — DEXAMETHASONE SODIUM PHOSPHATE 4 MG: 4 INJECTION, SOLUTION INTRA-ARTICULAR; INTRALESIONAL; INTRAMUSCULAR; INTRAVENOUS; SOFT TISSUE at 09:29

## 2023-12-26 RX ADMIN — ONDANSETRON 4 MG: 2 INJECTION INTRAMUSCULAR; INTRAVENOUS at 09:21

## 2023-12-26 RX ADMIN — Medication 10 MG: at 10:26

## 2023-12-26 RX ADMIN — CEFAZOLIN SODIUM 2000 MG: 2 SOLUTION INTRAVENOUS at 09:21

## 2023-12-26 RX ADMIN — SUGAMMADEX 200 MG: 100 INJECTION, SOLUTION INTRAVENOUS at 10:47

## 2023-12-26 RX ADMIN — SODIUM CHLORIDE, POTASSIUM CHLORIDE, SODIUM LACTATE AND CALCIUM CHLORIDE 1000 ML: 600; 310; 30; 20 INJECTION, SOLUTION INTRAVENOUS at 07:37

## 2023-12-26 RX ADMIN — KETOROLAC TROMETHAMINE 15 MG: 30 INJECTION, SOLUTION INTRAMUSCULAR; INTRAVENOUS at 10:18

## 2023-12-26 NOTE — ANESTHESIA POSTPROCEDURE EVALUATION
Patient: Seema Burkett    Procedure Summary       Date: 12/26/23 Room / Location: [facility] OR 2 /  NIC OR    Anesthesia Start: 0919 Anesthesia Stop: 1054    Procedure: INGUINAL HERNIA REPAIR LAPAROSCOPIC WITH DAVINCI ROBOT (Left: Abdomen) Diagnosis:       Inguinal hernia without obstruction or gangrene, recurrence not specified, unspecified laterality      Incarcerated hernia      (Inguinal hernia without obstruction or gangrene, recurrence not specified, unspecified laterality [K40.90])      (Incarcerated hernia [K46.0])    Surgeons: Krunal Chu MD Provider: Bakari Ruiz CRNA    Anesthesia Type: general ASA Status: 3            Anesthesia Type: general    Vitals  No vitals data found for the desired time range.          Post Anesthesia Care and Evaluation    Patient location during evaluation: PACU  Patient participation: complete - patient participated  Level of consciousness: awake and alert  Pain score: 2  Pain management: satisfactory to patient    Airway patency: patent  Anesthetic complications: No anesthetic complications  PONV Status: none  Cardiovascular status: acceptable and stable  Respiratory status: acceptable  Hydration status: acceptable    Comments: Vitals signs as noted in nursing documentation as per protocol.

## 2023-12-26 NOTE — OP NOTE
PATIENT:    Seema Burkett    DATE OF SURGERY:   12/26/2023    PHYSICIAN:    Krunal Chu MD    REFERRING PHYSICIAN:  Lan Hollins DO    YOB: 1940    PREOPERATIVE DIAGNOSIS:  Left indirect inguinal hernia incarcerated    POSTOPERATIVE DIAGNOSIS: Left indirect inguinal hernia incarcerated    PROCEDURE: Robotic left inguinal herniorrhaphy with 3-D Max Mid Extra Large mesh    EBL:  Less than 50 cc    COMPLICATIONS:  None    HISTORY:  The patient presents to me for evaluation and treatment of a history significant for a left inguinal hernia.  They are here now for robotic repair with mesh.    ANESTHESIA:  General endotracheal.    OPERATIVE PROCEDURE:  The patient was taken to the operating room, placed in the supine position, and given general endotracheal anesthesia per the Anesthesia service.  The patient was prepped and draped in the normal sterile fashion and the patient was given preoperative IV antibiotics.  An appropriate timeout per the nursing staff was performed prior to the incision.  I did meet with the patient preoperatively and marked them accordingly.    I did make an incision in the midline approximately 4 cm superior to the umbilicus and then inserted a Veress needle to insufflate the abdomen with CO2.  An 8 mm Optiview robotic trocar was inserted carefully and the abdominal cavity was entered.  Under direct vision separate 8 mm right and left robotic trocars were inserted after incisions were made in these locations.  There was good visualization performed and there was evidence of a left indirect inguinal hernia.  We did reduce some incarcerated omentum.    Good exposure was obtained, we did score the peritoneum with robotic scissors and electrocautery and then brought down the peritoneal flap carefully dissecting in this avascular plane both laterally and medially.  We were able to identify the pubic tubercle and Luciano's ligament, there was evidence of the  above-mentioned hernia defect.  We continued our dissection laterally carefully leaving fatty tissue on the abdominal wall in order to avoid nerve structures.  There was evidence of a lipoma, this was reduced without difficulty.     We then were able to reduce the inguinal sac without difficulty, the round ligament was noted in it's normal position.  It was divided with bipolar electrocautery. We were careful to avoid getting close to the iliac vein or artery.  After we had the sac dissected in its entirety and a nice flap of peritoneum inferiorly we were able to place the above-mentioned 3D max mid large mesh in place without difficulty after inserting it through one of the lateral 8 mm trocars.    A single 3-0 Vicryl suture was used near the pubic tubercle to tack the mesh in place, there was excellent coverage of the above-mentioned hernia defect, the peritoneum was reapproximated with a running 2-0 Stratifix spiral suture without difficulty.  At this time the trocars were undocked, the instruments had previously been removed, and then the trocars were removed and 4-0 Vicryl subcuticular stitch and Steri-Strips were used for skin reapproximation.  Local Marcaine with epinephrine was used for postoperative anesthetic.    The patient was stable at this point in time and subsequently transferred back to the recovery room in stable condition.    PLAN:  I did have a detailed discussion with the patient's son Bakari at [phone], they understand the patient's current condition, the findings at the time of operative intervention, and the treatment plan.    Krunal Chu MD

## 2023-12-26 NOTE — ANESTHESIA PROCEDURE NOTES
Airway  Urgency: elective    Date/Time: 12/26/2023 9:23 AM  Airway not difficult    General Information and Staff    Patient location during procedure: OR  CRNA/CAA: Bakari Ruiz CRNA    Indications and Patient Condition  Indications for airway management: airway protection    Preoxygenated: yes  MILS not maintained throughout  Mask difficulty assessment: 1 - vent by mask    Final Airway Details  Final airway type: endotracheal airway      Successful airway: ETT  Cuffed: yes   Successful intubation technique: direct laryngoscopy  Endotracheal tube insertion site: oral  Blade: Sanjana  Blade size: 3  ETT size (mm): 7.0  Cormack-Lehane Classification: grade I - full view of glottis  Placement verified by: chest auscultation and capnometry   Cuff volume (mL): 7  Measured from: lips  ETT/EBT  to lips (cm): 21  Number of attempts at approach: 1  Assessment: lips, teeth, and gum same as pre-op and atraumatic intubation    Additional Comments  Negative epigastric sounds, Breath sound equal bilaterally with symmetric chest rise and fall

## 2023-12-26 NOTE — ANESTHESIA PREPROCEDURE EVALUATION
Anesthesia Evaluation     Patient summary reviewed and Nursing notes reviewed   NPO Solid Status: > 8 hours  NPO Liquid Status: > 8 hours           Airway   Mallampati: II  TM distance: >3 FB  Neck ROM: full  Possible difficult intubation  Dental    (+) partials    Pulmonary - normal exam   (+) ,sleep apnea (no cpap)  (-) not a smoker  Cardiovascular - normal exam  Exercise tolerance: good (4-7 METS)    ECG reviewed    (+) hypertension, valvular problems/murmurs MVP, angina, CHF , hyperlipidemia    ROS comment: Test Reason : Pre-Op / Pre-Procedure  Blood Pressure :   */*   mmHG  Vent. Rate :  63 BPM     Atrial Rate :  63 BPM     P-R Int : 162 ms          QRS Dur :  86 ms      QT Int : 424 ms       P-R-T Axes :  50  61  72 degrees     QTc Int : 433 ms     Normal sinus rhythm  Normal ECG  When compared with ECG of 08-APR-2014 11:20,  No significant change was found  Confirmed by SARAH GRIER ([id number]) on 12/23/2023 8:18:40 AM     Referred By: LACEY           Confirmed By: SARAH GRIER    12/30/20 echo -  · Estimated left ventricular EF = 66% Left ventricular ejection fraction appears to be 66 - 70%. Left ventricular systolic function is normal.  · Left atrial volume is mildly increased.  · Left ventricular diastolic function is consistent with age.  · Estimated right ventricular systolic pressure from tricuspid regurgitation is normal (<35 mmHg). Calculated right ventricular systolic pressure from tricuspid regurgitation is 25 mmHg.      Neuro/Psych- negative ROS  GI/Hepatic/Renal/Endo    (+) obesity, GERD, hepatitis, liver disease, renal disease-, diabetes mellitus    Musculoskeletal     Abdominal   (+) obese   Substance History - negative use     OB/GYN negative ob/gyn ROS         Other   arthritis,   history of cancer                  Anesthesia Plan    ASA 3     general     (Risks and benefits discussed including risk of aspiration, recall and dental damage. All patient questions answered.    Will continue  with plan of care.)  intravenous induction     Anesthetic plan, risks, benefits, and alternatives have been provided, discussed and informed consent has been obtained with: patient.  Pre-procedure education provided    CODE STATUS:

## 2024-01-03 ENCOUNTER — TELEPHONE (OUTPATIENT)
Dept: FAMILY MEDICINE CLINIC | Facility: CLINIC | Age: 84
End: 2024-01-03

## 2024-01-03 RX ORDER — TRAZODONE HYDROCHLORIDE 50 MG/1
50 TABLET ORAL NIGHTLY PRN
Qty: 30 TABLET | Refills: 0 | Status: SHIPPED | OUTPATIENT
Start: 2024-01-03

## 2024-01-03 NOTE — TELEPHONE ENCOUNTER
Caller: Seema Petit    Relationship: Self    Best call back number     What form or medical record are you requesting: MEDICAL CLEARANCE FOR SURGERY    Who is requesting this form or medical record from you: DR OBRIEN     How would you like to receive the form or medical records (pick-up, mail, fax): NOT GIVEN    PHONE NUMBER:  MICHAEL      Timeframe paperwork needed: ASAP    Additional notes: PATIENT HAS SURGERY FOR HER KNEE  [id number]

## 2024-01-03 NOTE — TELEPHONE ENCOUNTER
Caller: eSema Petit    Relationship: Self    Best call back number: [phone]    Which medication are you concerned about: pramipexole (MIRAPEX) 0.25 MG tablet       Who prescribed you this medication: DR QUEEN    When did you start taking this medication: NA    What are your concerns: PATIENT ADVISED THIS IS NOT WORKING FOR HER; SHE IS STILL HAVING PAIN AT NIGHT WITH HER LEGS; SHE ADVISED POSSIBLY  THE DOSAGE NEEDS TO BE INCREASED. SHE ALSO PREVIOUSLY  WAS ON ROPINIROLE, AND THEN WAS GIVEN THIS NEW MEDICATION; PATIENT ADVISED IF SHE STANDS UP, THE PAIN GOES AWAY, WITH THIS NEW MEDICATION BUT IT WONT ALLOW HER TO LAY DOWN TO GO TO SLEEP   PATIENT ASKED WHICHEVER ONE IF DR QUEEN THINKS IS BETTER AND  ALSO ON THE MIRAPEX, SHE DOES NOT HAVE ENOUGH FOR THE WEEK IF THIS NEEDS TO BE INCREASED IN DOSAGE IF DR QUEEN WANTS HER  TO STAY ON THIS MEDICATION.     PLEASE CALL TO ADVISE

## 2024-01-03 NOTE — TELEPHONE ENCOUNTER
,  Caller: Seema Petit    Relationship: Self    Best call back number:      Requested Prescriptions:   Requested Prescriptions     Pending Prescriptions Disp Refills    traZODone (DESYREL) 50 MG tablet       Sig: Take 1 tablet by mouth At Night As Needed.        Pharmacy where request should be sent: [facility] - [place] - [address] - [phone]  - [phone] FX     Last office visit with prescribing clinician: 5/9/2022   Last telemedicine visit with prescribing clinician: Visit date not found   Next office visit with prescribing clinician: 3/4/2024     Additional details provided by patient: PATIENT IS OUT OF MEDICATION    Does the patient have less than a 3 day supply:  [x] Yes  [] No    Katelyn Winter Rep   01/03/24 14:28 EST

## 2024-01-09 NOTE — PROGRESS NOTES
"Patient: Seema Burkett    YOB: 1940    Date: 01/10/2024    Primary Care Provider: Lan Hollins DO    Chief Complaint   Patient presents with    Post-op     left inguinal hernia        History of present illness:  I saw the patient in the office today as a followup from their recent left inguinal hernia repair which was performed 12/26/2023.  They state that they have done well and are having no complaints.    Vital Signs:   Vitals:    01/10/24 1345   Temp: 97.8 °F (36.6 °C)   TempSrc: Temporal   Weight: 88 kg (194 lb)   Height: 167.6 cm (66\")       Physical Exam:   General Appearance:    Alert, cooperative, in no acute distress   Abdomen:     no masses, no organomegaly, soft non-tender, non-distended, no guarding, wounds are well healed, no evidence of recurrent hernia     Assessment / Plan:    1. Post-operative state        I did discuss the situation with the patient today in the office and they have done well from their recent herniorraphy.  I have released the patient back to normal activity, they understand that they need to be careful about heavy lifting.  I need to see the patient back in the office only if they are having further problems, they know to call me if they are.    Electronically signed by Krunal Chu MD  01/10/24              "

## 2024-01-10 ENCOUNTER — OFFICE VISIT (OUTPATIENT)
Dept: SURGERY | Facility: CLINIC | Age: 84
End: 2024-01-10
Payer: MEDICARE

## 2024-01-10 VITALS — HEIGHT: 66 IN | BODY MASS INDEX: 31.18 KG/M2 | WEIGHT: 194 LBS | TEMPERATURE: 97.8 F

## 2024-01-10 DIAGNOSIS — Z98.890 POST-OPERATIVE STATE: Primary | ICD-10-CM

## 2024-01-10 PROCEDURE — 99024 POSTOP FOLLOW-UP VISIT: CPT | Performed by: SURGERY

## 2024-01-10 PROCEDURE — 1159F MED LIST DOCD IN RCRD: CPT | Performed by: SURGERY

## 2024-01-10 PROCEDURE — 1160F RVW MEDS BY RX/DR IN RCRD: CPT | Performed by: SURGERY

## 2024-01-17 ENCOUNTER — READMISSION MANAGEMENT (OUTPATIENT)
Dept: CALL CENTER | Facility: HOSPITAL | Age: 84
End: 2024-01-17
Payer: MEDICARE

## 2024-01-17 NOTE — OUTREACH NOTE
Prep Survey      Flowsheet Row Responses   [facility] facility patient discharged from? Non-[facility]   Is LACE score < 7 ? Non-BH Discharge   Eligibility [facility]   Hospital [facility]   Date of Admission 01/15/24   Date of Discharge 01/16/24   Discharge Disposition Home or Self Care   Discharge diagnosis (LT REVISION TOTAL KNEE ARTHROPLASTY)   Does the patient have one of the following disease processes/diagnoses(primary or secondary)? General Surgery   Prep survey completed? Yes            Araceli White Registered Nurse

## 2024-01-18 ENCOUNTER — TRANSITIONAL CARE MANAGEMENT TELEPHONE ENCOUNTER (OUTPATIENT)
Dept: CALL CENTER | Facility: HOSPITAL | Age: 84
End: 2024-01-18
Payer: MEDICARE

## 2024-01-18 NOTE — OUTREACH NOTE
Call Center TCM Note      Flowsheet Row Responses   [facility] patient discharged from? Non-   Does the patient have one of the following disease processes/diagnoses(primary or secondary)? Other   TCM attempt successful? Yes  [No VR]   Call start time 0909   Call end time 0924   Discharge diagnosis (LT REVISION TOTAL KNEE ARTHROPLASTY)   Meds reviewed with patient/caregiver? Yes   Is the patient having any side effects they believe may be caused by any medication additions or changes? No   Does the patient have all medications ordered at discharge? Yes   Is the patient taking all medications as directed (includes completed medication regime)? Yes   Comments [facility] d/c follow up 1/25/24@1100.   Does the patient have an appointment with their PCP within 7-14 days of discharge? Yes   Has home health visited the patient within 72 hours of discharge? N/A   Psychosocial issues? No   Did the patient receive a copy of their discharge instructions? Yes   What is the patient's perception of their health status since discharge? Improving   Is the patient/caregiver able to teach back signs and symptoms related to disease process for when to call PCP? Yes   Is the patient/caregiver able to teach back signs and symptoms related to disease process for when to call 911? Yes   Is the patient/caregiver able to teach back the hierarchy of who to call/visit for symptoms/problems? PCP, Specialist, Home health nurse, Urgent Care, ED, 911 Yes   If the patient is a current smoker, are they able to teach back resources for cessation? Not a smoker   TCM call completed? Yes   Wrap up additional comments Patient doing well, denies any concerns today   Call end time 0924   Would this patient benefit from a Referral to [facility] Social Work? No   Is the patient interested in additional calls from an ambulatory ? No            Odalys Pretty RN    1/18/2024, 09:25 EST

## 2024-01-25 ENCOUNTER — OFFICE VISIT (OUTPATIENT)
Dept: FAMILY MEDICINE CLINIC | Facility: CLINIC | Age: 84
End: 2024-01-25
Payer: COMMERCIAL

## 2024-01-25 DIAGNOSIS — I83.813 VARICOSE VEINS OF BILATERAL LOWER EXTREMITIES WITH PAIN: ICD-10-CM

## 2024-01-25 DIAGNOSIS — I50.32 CHRONIC DIASTOLIC (CONGESTIVE) HEART FAILURE: Primary | ICD-10-CM

## 2024-01-25 DIAGNOSIS — E11.9 CONTROLLED TYPE 2 DIABETES MELLITUS WITHOUT COMPLICATION, WITHOUT LONG-TERM CURRENT USE OF INSULIN: ICD-10-CM

## 2024-01-25 DIAGNOSIS — G47.33 OBSTRUCTIVE SLEEP APNEA ON CPAP: ICD-10-CM

## 2024-01-25 DIAGNOSIS — M17.12 PRIMARY OSTEOARTHRITIS OF LEFT KNEE: ICD-10-CM

## 2024-01-25 DIAGNOSIS — F51.01 PRIMARY INSOMNIA: ICD-10-CM

## 2024-01-25 DIAGNOSIS — I10 ESSENTIAL HYPERTENSION: ICD-10-CM

## 2024-01-25 DIAGNOSIS — G25.81 RESTLESS LEGS SYNDROME: ICD-10-CM

## 2024-01-25 DIAGNOSIS — Z74.09 IMPAIRED MOBILITY AND ADLS: ICD-10-CM

## 2024-01-25 DIAGNOSIS — Z78.9 IMPAIRED MOBILITY AND ADLS: ICD-10-CM

## 2024-01-25 LAB
EXPIRATION DATE: NORMAL
Lab: NORMAL
POC CREATININE URINE: 10
POC MICROALBUMIN URINE: 10

## 2024-01-25 RX ORDER — PRAMIPEXOLE DIHYDROCHLORIDE 0.25 MG/1
0.25 TABLET ORAL
Qty: 90 TABLET | Refills: 1 | Status: SHIPPED | OUTPATIENT
Start: 2024-01-25 | End: 2024-01-25 | Stop reason: SDUPTHER

## 2024-01-25 RX ORDER — PRAMIPEXOLE DIHYDROCHLORIDE 0.5 MG/1
0.5 TABLET ORAL
Qty: 90 TABLET | Refills: 3 | Status: SHIPPED | OUTPATIENT
Start: 2024-01-25

## 2024-01-25 RX ORDER — TRAZODONE HYDROCHLORIDE 50 MG/1
50 TABLET ORAL NIGHTLY
Qty: 90 TABLET | Refills: 3 | Status: SHIPPED | OUTPATIENT
Start: 2024-01-25

## 2024-01-25 NOTE — PROGRESS NOTES
Established Patient        Chief Complaint:   Chief Complaint   Patient presents with    Follow-up     Recent hospital visit for hernia surgery and knee replacement     Med Refill    Insomnia     Requesting new sleep study         Seema Burkett is a 83 y.o. 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      Subjective     The following portions of the patient's history were reviewed and updated as appropriate: allergies, current medications, past family history, past medical history, past social history, past surgical history and problem list.    Allergies   Allergen Reactions    Gabapentin (Once-Daily) Hallucinations    Quinine Unknown - Low Severity    Quinine Derivatives Other (See Comments)     FLU LIKE SYMPTOMS       Review of Systems  Constitutional: Negative for fever. Negative for chills, diaphoresis, fatigue and unexpected weight change.   HENT: No dysphagia; no changes to vision/hearing/smell/taste; no epistaxis  Eyes: Negative for redness and visual disturbance.   Respiratory: negative for shortness of breath. Negative for chest pain . Negative for cough and chest tightness.   Cardiovascular: Negative for chest pain and palpitations.   Gastrointestinal: Negative for abdominal distention, abdominal pain and blood in stool.   Endocrine: Negative for cold intolerance and heat intolerance.   Genitourinary: Negative for difficulty urinating, dysuria and frequency.   Musculoskeletal: Chronic arthralgias of bilateral knees.  Skin: Postsurgical changes of expected nature.  Neurological: Negative for syncope, weakness and headaches.   Hematological: Negative for adenopathy. Does not bruise/bleed easily.   Psychiatric/Behavioral: Negative for confusion. The patient is not nervous/anxious.    Objective     Physical Exam   Vital Signs: BP  "130/82   Pulse 74   Temp 97 °F (36.1 °C)   Resp 16   Ht 167.6 cm (65.98\")   Wt 88.9 kg (196 lb)   SpO2 97%   BMI 31.65 kg/m²       Patient's (Body mass index is 31.65 kg/m².) indicates that they are obese (BMI >30) with health related conditions that include hypertension, diabetes mellitus, and osteoarthritis . Weight is improving with lifestyle modifications. BMI is is above average; BMI management plan is completed. We discussed portion control and increasing exercise.      General Appearance: alert, oriented x 3, no acute distress.  Skin: warm and dry.   HEENT: Atraumatic.  pupils round and reactive to light and accommodation, oral mucosa pink and moist.  Nares patent without epistaxis.  External auditory canals are patent tympanic membranes intact.  Neck: supple, no JVD, trachea midline.  No thyromegaly  Lungs: CTA, unlabored breathing effort.  Heart: RRR, normal S1 and S2, no S3, no rub.  Abdomen: soft, non-tender, no palpable bladder, present bowel sounds to auscultation ×4.  No guarding or rigidity.  Extremities: no clubbing, cyanosis.  Expected gravity dependent edema bilateral lower extremities.  Postsurgical scarring noted to the anterior bilateral knees.  There is mild tenderness on medial/lateral joint line tenderness of the knees.  Yasmine/Mackenzie sign negative.  Normal dorsiflexion/plantarflexion of the feet..  Neuro: normal speech and mental status.  Cranial nerves II through XII intact.  No anosmia. DTR 2+; proprioception intact.  No focal motor/sensory deficits.    Advance Care Planning   ACP discussion was held with the patient during this visit. Patient does not have an advance directive, information provided.       Assessment and Plan      Assessment/Plan:   Diagnoses and all orders for this visit:    1. Chronic diastolic (congestive) heart failure (Primary)    2. Restless legs syndrome  -     Discontinue: pramipexole (MIRAPEX) 0.25 MG tablet; Take 1 tablet by mouth every night at bedtime.  " Dispense: 90 tablet; Refill: 1  -     pramipexole (MIRAPEX) 0.5 MG tablet; Take 1 tablet by mouth every night at bedtime.  Dispense: 90 tablet; Refill: 3    3. Essential hypertension    4. Controlled type 2 diabetes mellitus without complication, without long-term current use of insulin  -     POC Microalbumin    5. Impaired mobility and ADLs    6. Primary osteoarthritis of left knee    7. Varicose veins of bilateral lower extremities with pain    8. Primary insomnia  -     traZODone (DESYREL) 50 MG tablet; Take 1 tablet by mouth Every Night.  Dispense: 90 tablet; Refill: 3    9. Obstructive sleep apnea on CPAP      Inc dose of pramipexole to 0.5 mg strength; will follow clinically.    Keep scheduled f/u appt with ortho surgery; planned change of dressing with therapy tomorrow.    Referral to sleep medicine, possibly will need updated sleep study.    Refill trazodone.    Vital signs demonstrate hemodynamic stability.  Blood pressure is at goal.    Urine microalbumin today.  Continue healthy dietary choices, as well as maintain appropriate hydration status.  Avoid prolonged fasting periods as best able.    Pain appears clinically well-controlled.      Discussion Summary:    Discussed plan of care in detail with pt today; pt verb understanding and agrees.    I spent 35 minutes caring for [place] on this date of service. This time includes time spent by me in the following activities:preparing for the visit, performing a medically appropriate examination and/or evaluation , counseling and educating the patient/family/caregiver, ordering medications, tests, or procedures, documenting information in the medical record, independently interpreting results and communicating that information with the patient/family/caregiver, and care coordination    I have reviewed and updated all copied forward information, as appropriate.  I attest to the accuracy and relevance of any unchanged information.    Follow up:  Return in about  6 months (around 7/25/2024) for Recheck.     There are no Patient Instructions on file for this visit.    Lan Hollins DO  02/07/24  16:56 EST

## 2024-01-29 ENCOUNTER — TELEPHONE (OUTPATIENT)
Dept: FAMILY MEDICINE CLINIC | Facility: CLINIC | Age: 84
End: 2024-01-29

## 2024-02-07 VITALS
RESPIRATION RATE: 16 BRPM | HEIGHT: 66 IN | OXYGEN SATURATION: 97 % | TEMPERATURE: 97 F | DIASTOLIC BLOOD PRESSURE: 82 MMHG | HEART RATE: 74 BPM | SYSTOLIC BLOOD PRESSURE: 130 MMHG | WEIGHT: 196 LBS | BODY MASS INDEX: 31.5 KG/M2

## 2024-03-04 ENCOUNTER — OFFICE VISIT (OUTPATIENT)
Dept: FAMILY MEDICINE CLINIC | Facility: CLINIC | Age: 84
End: 2024-03-04
Payer: MEDICARE

## 2024-03-04 VITALS
BODY MASS INDEX: 32.99 KG/M2 | WEIGHT: 198 LBS | HEIGHT: 65 IN | DIASTOLIC BLOOD PRESSURE: 90 MMHG | OXYGEN SATURATION: 97 % | SYSTOLIC BLOOD PRESSURE: 140 MMHG | HEART RATE: 81 BPM

## 2024-03-04 DIAGNOSIS — M15.9 PRIMARY OSTEOARTHRITIS INVOLVING MULTIPLE JOINTS: ICD-10-CM

## 2024-03-04 DIAGNOSIS — M25.561 ARTHRALGIA OF BOTH KNEES: Chronic | ICD-10-CM

## 2024-03-04 DIAGNOSIS — I50.32 CHRONIC DIASTOLIC (CONGESTIVE) HEART FAILURE: ICD-10-CM

## 2024-03-04 DIAGNOSIS — Z91.81 AT HIGH RISK FOR FALLS: ICD-10-CM

## 2024-03-04 DIAGNOSIS — M25.562 ARTHRALGIA OF BOTH KNEES: Chronic | ICD-10-CM

## 2024-03-04 DIAGNOSIS — M54.16 CHRONIC LUMBAR RADICULOPATHY: ICD-10-CM

## 2024-03-04 DIAGNOSIS — I10 ESSENTIAL HYPERTENSION: ICD-10-CM

## 2024-03-04 DIAGNOSIS — F51.01 PRIMARY INSOMNIA: ICD-10-CM

## 2024-03-04 DIAGNOSIS — Z00.00 MEDICARE ANNUAL WELLNESS VISIT, SUBSEQUENT: Primary | ICD-10-CM

## 2024-03-04 PROBLEM — E11.9 DIABETES MELLITUS TYPE 2, CONTROLLED, WITHOUT COMPLICATIONS: Status: RESOLVED | Noted: 2022-10-19 | Resolved: 2024-03-04

## 2024-03-04 PROBLEM — M15.0 PRIMARY OSTEOARTHRITIS INVOLVING MULTIPLE JOINTS: Status: ACTIVE | Noted: 2024-03-04

## 2024-03-04 RX ORDER — METHYLPREDNISOLONE 4 MG/1
TABLET ORAL
COMMUNITY
Start: 2024-02-26 | End: 2024-03-04

## 2024-03-04 RX ORDER — DOCUSATE SODIUM 50 MG/5ML
LIQUID ORAL
COMMUNITY
Start: 2024-01-17

## 2024-03-04 RX ORDER — DIAZEPAM 2 MG/1
TABLET ORAL
COMMUNITY

## 2024-03-04 RX ORDER — POTASSIUM CHLORIDE 750 MG/1
1 TABLET, FILM COATED, EXTENDED RELEASE ORAL DAILY
COMMUNITY

## 2024-03-04 RX ORDER — PREGABALIN 50 MG/1
50 CAPSULE ORAL 2 TIMES DAILY
Qty: 60 CAPSULE | Refills: 2 | Status: SHIPPED | OUTPATIENT
Start: 2024-03-04

## 2024-03-04 RX ORDER — ONDANSETRON 4 MG/1
4 TABLET, FILM COATED ORAL
COMMUNITY
Start: 2024-01-16

## 2024-03-04 NOTE — PATIENT INSTRUCTIONS
Advance Care Planning and Advance Directives     You make decisions on a daily basis - decisions about where you want to live, your career, your home, your life. Perhaps one of the most important decisions you face is your choice for future medical care. Take time to talk with your family and your healthcare team and start planning today.  Advance Care Planning is a process that can help you:  Understand possible future healthcare decisions in light of your own experiences  Reflect on those decision in light of your goals and values  Discuss your decisions with those closest to you and the healthcare professionals that care for you  Make a plan by creating a document that reflects your wishes    Surrogate Decision Maker  In the event of a medical emergency, which has left you unable to communicate or to make your own decisions, you would need someone to make decisions for you.  It is important to discuss your preferences for medical treatment with this person while you are in good health.     Qualities of a surrogate decision maker:  Willing to take on this role and responsibility  Knows what you want for future medical care  Willing to follow your wishes even if they don't agree with them  Able to make difficult medical decisions under stressful circumstances    Advance Directives  These are legal documents you can create that will guide your healthcare team and decision maker(s) when needed. These documents can be stored in the electronic medical record.    Living Will - a legal document to guide your care if you have a terminal condition or a serious illness and are unable to communicate. States vary by statute in document names/types, but most forms may include one or more of the following:        -  Directions regarding life-prolonging treatments        -  Directions regarding artificially provided nutrition/hydration        -  Choosing a healthcare decision maker        -  Direction regarding organ/tissue  donation    Durable Power of  for Healthcare - this document names an -in-fact to make medical decisions for you, but it may also allow this person to make personal and financial decisions for you. Please seek the advice of an  if you need this type of document.    **Advance Directives are not required and no one may discriminate against you if you do not sign one.    Medical Orders  Many states allow specific forms/orders signed by your physician to record your wishes for medical treatment in your current state of health. This form, signed in personal communication with your physician, addresses resuscitation and other medical interventions that you may or may not want.      For more information or to schedule a time with a [facility] Advance Care Planning Facilitator contact: [facility]ACP or call [phone] and someone will contact you directly.  Fall Prevention in the Home, Adult  Falls can cause injuries and affect people of all ages. There are many simple things that you can do to make your home safe and to help prevent falls.  If you need it, ask for help making these changes.  What actions can I take to prevent falls?  General information  Use good lighting in all rooms. Make sure to:  Replace any light bulbs that burn out.  Turn on lights if it is dark and use night-lights.  Keep items that you use often in easy-to-reach places. Lower the shelves around your home if needed.  Move furniture so that there are clear paths around it.  Do not keep throw rugs or other things on the floor that can make you trip.  If any of your floors are uneven, fix them.  Add color or contrast paint or tape to clearly liana and help you see:  Grab bars or handrails.  First and last steps of staircases.  Where the edge of each step is.  If you use a ladder or stepladder:  Make sure that it is fully opened. Do not climb a closed ladder.  Make sure the sides of the ladder are locked in  place.  Have someone hold the ladder while you use it.  Know where your pets are as you move through your home.  What can I do in the bathroom?         Keep the floor dry. Clean up any water that is on the floor right away.  Remove soap buildup in the bathtub or shower. Buildup makes bathtubs and showers slippery.  Use non-skid mats or decals on the floor of the bathtub or shower.  Attach bath mats securely with double-sided, non-slip rug tape.  If you need to sit down while you are in the shower, use a non-slip stool.  Install grab bars by the toilet and in the bathtub and shower. Do not use towel bars as grab bars.  What can I do in the bedroom?  Make sure that you have a light by your bed that is easy to reach.  Do not use any sheets or blankets on your bed that hang to the floor.  Have a firm bench or chair with side arms that you can use for support when you get dressed.  What can I do in the kitchen?  Clean up any spills right away.  If you need to reach something above you, use a sturdy step stool that has a grab bar.  Keep electrical cables out of the way.  Do not use floor polish or wax that makes floors slippery.  What can I do with my stairs?  Do not leave anything on the stairs.  Make sure that you have a light switch at the top and the bottom of the stairs. Have them installed if you do not have them.  Make sure that there are handrails on both sides of the stairs. Fix handrails that are broken or loose. Make sure that handrails are as long as the staircases.  Install non-slip stair treads on all stairs in your home if they do not have carpet.  Avoid having throw rugs at the top or bottom of stairs, or secure the rugs with carpet tape to prevent them from moving.  Choose a carpet design that does not hide the edge of steps on the stairs. Make sure that carpet is firmly attached to the stairs. Fix any carpet that is loose or worn.  What can I do on the outside of my home?  Use bright outdoor  lighting.  Repair the edges of walkways and driveways and fix any cracks. Clear paths of anything that can make you trip, such as tools or rocks.  Add color or contrast paint or tape to clearly liana and help you see high doorway thresholds.  Trim any bushes or trees on the main path into your home.  Check that handrails are securely fastened and in good repair. Both sides of all steps should have handrails.  Install guardrails along the edges of any raised decks or porches.  Have leaves, snow, and ice cleared regularly. Use sand, salt, or ice melt on walkways during winter months if you live where there is ice and snow.  In the garage, clean up any spills right away, including grease or oil spills.  What other actions can I take?  Review your medicines with your health care provider. Some medicines can make you confused or feel dizzy. This can increase your chance of falling.  Wear closed-toe shoes that fit well and support your feet. Wear shoes that have rubber soles and low heels.  Use a cane, walker, scooter, or crutches that help you move around if needed.  Talk with your provider about other ways that you can decrease your risk of falls. This may include seeing a physical therapist to learn to do exercises to improve movement and strength.  Where to find more information  Centers for Disease Control and PreventionYVAN: cdc.gov  National [place] on Aging: crystal.nih.gov  National [place] on Aging: crystal.nih.gov  Contact a health care provider if:  You are afraid of falling at home.  You feel weak, drowsy, or dizzy at home.  You fall at home.  Get help right away if you:  Lose consciousness or have trouble moving after a fall.  Have a fall that causes a head injury.  These symptoms may be an emergency. Get help right away. Call 911.  Do not wait to see if the symptoms will go away.  Do not drive yourself to the hospital.  This information is not intended to replace advice given to you by your health care  provider. Make sure you discuss any questions you have with your health care provider.  Document Revised: 08/21/2023 Document Reviewed: 08/21/2023  Elsevier Patient Education © 2023 [employer]    Sit-to-Stand Exercise    The sit-to-stand exercise (also known as the chair stand or chair rise exercise) strengthens your lower body and helps you maintain or improve your mobility and independence. The end goal is to do the sit-to-stand exercise without using your hands. This will be easier as you become stronger. You should always talk with your health care provider before starting any exercise program, especially if you have had recent surgery.  Do the exercise exactly as told by your health care provider and adjust it as directed. It is normal to feel mild stretching, pulling, tightness, or discomfort as you do this exercise, but you should stop right away if you feel sudden pain or your pain gets worse. Do not begin doing this exercise until told by your health care provider.  What the sit-to-stand exercise does  The sit-to-stand exercise helps to strengthen the muscles in your thighs and the muscles in the center of your body that give you stability (core muscles). This exercise is especially helpful if:  You have had knee or hip surgery.  You have trouble getting up from a chair, out of a car, or off the toilet due to muscle weakness.  How to do the sit-to-stand exercise  Sit toward the front edge of a sturdy chair without armrests. Your knees should be bent and your feet should be flat on the floor and shoulder-width apart and underneath your hips.  Place your hands lightly on each side of the seat. Keep your back and neck as straight as possible, with your chest slightly forward.  Breathe in slowly. Lean forward and slightly shift your weight to the front of your feet.  Breathe out as you slowly stand up. Try not to support any weight with your hands.  Stand and pause for a full breath in and out.  Breathe in  as you sit down slowly. Tighten your core and abdominal muscles to control your lowering as much as possible. You should lower yourself back to the chair slowly, not just drop back into the seat.  Breathe out slowly.  Do this exercise 10-15 times. If needed, do it fewer times until you build up strength.  Rest for 1 minute, then do another set of 10-15 repetitions.  To change the difficulty of the sit-to-stand exercise  If the exercise is too difficult, use a chair with sturdy armrests, and push off the armrests to help you come to the standing position. You can also use the armrests to help slowly lower yourself back to sitting. As this gets easier, try to use your arms less. You can also place a firm cushion or pillow on the chair to make the surface higher.  If this exercise is too easy, do not use your arms to help raise or lower yourself. You can also wear a weighted vest, use hand weights, increase your repetitions, or try a lower chair.  General tips  You may feel tired when starting an exercise routine. This is normal.  You may have muscle soreness that lasts a few days. This is normal. As you get stronger, you may not feel muscle soreness.  Use smooth, steady movements.  Do not  hold your breath during strength exercises. This can cause unsafe changes in your blood pressure.  Breathe in slowly through your nose, and breathe out slowly through your mouth.  Summary  Strengthening your lower body is an important step to help you move safely and independently.  The sit-to-stand exercise helps strengthen the muscles in your thighs and core.  You should always talk with your health care provider before starting any exercise program, especially if you have had recent surgery.  This information is not intended to replace advice given to you by your health care provider. Make sure you discuss any questions you have with your health care provider.  Document Revised: 04/10/2022 Document Reviewed: 04/10/2022  [employer]  Patient Education ©  Elsevier Inc.      Medicare Wellness  Personal Prevention Plan of Service     Date of Office Visit:    Encounter Provider:  Lan Hollins DO  Place of Service:  [facility]  Patient Name: Seema Burkett  :  1940    As part of the Medicare Wellness portion of your visit today, we are providing you with this personalized preventive plan of services (PPPS). This plan is based upon recommendations of the [place] Preventive Services Task Force (USPSTF) and the Advisory Committee on Immunization Practices (ACIP).    This lists the preventive care services that should be considered, and provides dates of when you are due. Items listed as completed are up-to-date and do not require any further intervention.    Health Maintenance   Topic Date Due   • ZOSTER VACCINE (1 of 2) Never done   • RSV Vaccine - Adults (1 - 1-dose 60+ series) Never done   • DXA SCAN  2019   • LIPID PANEL  2020   • INFLUENZA VACCINE  2023   • COVID-19 Vaccine ( - - season) Never done   • HEMOGLOBIN A1C  2024   • DIABETIC EYE EXAM  2024   • ANNUAL WELLNESS VISIT  10/27/2024   • URINE MICROALBUMIN  2025   • BMI FOLLOWUP  2025   • TDAP/TD VACCINES (3 - Td or Tdap) 2027   • Pneumococcal Vaccine 65+  Completed       No orders of the defined types were placed in this encounter.      No follow-ups on file.

## 2024-03-04 NOTE — PROGRESS NOTES
The ABCs of the Annual Wellness Visit  Subsequent Medicare Wellness Visit    Subjective      Seema Burkett is a 83 y.o. female who presents for a Subsequent Medicare Wellness Visit.    The following portions of the patient's history were reviewed and   updated as appropriate: allergies, current medications, past family history, past medical history, past social history, past surgical history, and problem list.    Compared to one year ago, the patient feels her physical   health is the same.    Compared to one year ago, the patient feels her mental   health is the same.    Recent Hospitalizations:  She was not admitted to the hospital during the last year.       Current Medical Providers:  Patient Care Team:  Lan Hollins DO as PCP - General (Family Medicine)  Lan Hollins DO (Long Term Care Facility)  Inga Wesley APRN as Nurse Practitioner (Long Term Care Acute)  Krunal Chu MD as Consulting Physician (General Surgery)    Outpatient Medications Prior to Visit   Medication Sig Dispense Refill    aspirin 81 MG chewable tablet Chew 1 tablet Daily.      diazePAM (VALIUM) 2 MG tablet TAKE ONE TABLET BY MOUTH EVERY NIGHT AS NEEDED FOR INSOMNIA      diclofenac (VOLTAREN) 50 MG EC tablet TAKE 1 TABLET TWICE DAILY 180 tablet 3    Docusate Sodium (COLACE) 100 MG/10ML 1 capsule 2 TIMES DAILY (route: oral)      DULoxetine (CYMBALTA) 30 MG capsule Take 1 capsule by mouth Daily. 90 capsule 1    furosemide (LASIX) 20 MG tablet TAKE 1 TABLET EVERY DAY 90 tablet 3    gabapentin (NEURONTIN) 300 MG capsule TAKE 1 CAPSULE TWICE DAILY 60 capsule 2    HYDROcodone-acetaminophen (NORCO) 7.5-325 MG per tablet Take 1 tablet by mouth Every 6 (Six) Hours As Needed for Moderate Pain (Pain). 15 tablet 0    MAGNESIUM-OXIDE PO Take  by mouth.      potassium chloride (K-DUR,KLOR-CON) 10 MEQ CR tablet TAKE 1 TABLET EVERY DAY 90 tablet 3    potassium chloride (K-DUR,KLOR-CON) 10 MEQ ER tablet Take 1 tablet by mouth  Daily.      pramipexole (MIRAPEX) 0.5 MG tablet Take 1 tablet by mouth every night at bedtime. 90 tablet 3    traZODone (DESYREL) 50 MG tablet Take 1 tablet by mouth Every Night. 90 tablet 3    vitamin B-6 (PYRIDOXINE) 50 MG tablet Take 1 tablet by mouth Daily.      methylPREDNISolone (MEDROL) 4 MG dose pack Take PER package directions (Patient not taking: Reported on 3/4/2024)      ondansetron (ZOFRAN) 4 MG tablet Take 1 tablet by mouth. (Patient not taking: Reported on 3/4/2024)       No facility-administered medications prior to visit.       Opioid medication/s are on active medication list.  and I have evaluated her active treatment plan and pain score trends (see table).  There were no vitals filed for this visit.  I have reviewed the chart for potential of high risk medication and harmful drug interactions in the elderly.          Aspirin is on active medication list. Aspirin use is indicated based on review of current medical condition/s. Pros and cons of this therapy have been discussed today. Benefits of this medication outweigh potential harm.  Patient has been encouraged to continue taking this medication.  .      Patient Active Problem List   Diagnosis    Arthralgia of both knees    Varicose veins of bilateral lower extremities with pain    Primary osteoarthritis of left knee    Essential hypertension    Chronic diastolic (congestive) heart failure    Restless legs syndrome    Primary insomnia    Obstructive sleep apnea on CPAP    Vitamin B12 deficiency    Traumatic tear of supraspinatus tendon of left shoulder    Fibromyalgia    Idiopathic osteoarthritis    Complicated grieving    Difficulty walking    Muscle weakness    Hx of total knee arthroplasty, right    Impaired mobility and ADLs    Inguinal hernia of left side without obstruction or gangrene    Constipation    Inguinal hernia without obstruction or gangrene    Incarcerated hernia    Primary osteoarthritis involving multiple joints     Advance  "Care Planning   Advance Care Planning     Advance Directive is on file.  ACP discussion was held with the patient during this visit. Patient has an advance directive in EMR which is still valid.     Review of Systems  Constitutional: Negative for fever. Negative for chills, diaphoresis, fatigue and unexpected weight change.   HENT: No dysphagia; no changes to vision/hearing/smell/taste; no epistaxis  Eyes: Negative for redness and visual disturbance.   Respiratory: negative for shortness of breath. Negative for chest pain . Negative for cough and chest tightness.   Cardiovascular: Negative for chest pain and palpitations.   Gastrointestinal: Negative for abdominal distention, abdominal pain and blood in stool.   Endocrine: Negative for cold intolerance and heat intolerance.   Genitourinary: Negative for difficulty urinating, dysuria and frequency.   Musculoskeletal: Chronic arthralgias of bilateral knees.  Skin: Postsurgical changes of expected nature.  Neurological: Negative for syncope, weakness and headaches.   Hematological: Negative for adenopathy. Does not bruise/bleed easily.   Psychiatric/Behavioral: Negative for confusion. The patient is not nervous/anxious.       Objective    Vitals:    03/04/24 1552   BP: 140/90   Pulse: 81   SpO2: 97%   Weight: 89.8 kg (198 lb)   Height: 165.1 cm (65\")     Estimated body mass index is 32.95 kg/m² as calculated from the following:    Height as of this encounter: 165.1 cm (65\").    Weight as of this encounter: 89.8 kg (198 lb).     General Appearance: alert, oriented x 3, no acute distress.  Skin: warm and dry.   HEENT: Atraumatic.  pupils round and reactive to light and accommodation, oral mucosa pink and moist.  Nares patent without epistaxis.  External auditory canals are patent tympanic membranes intact.  Neck: supple, no JVD, trachea midline.  No thyromegaly  Lungs: CTA, unlabored breathing effort.  Heart: RRR, normal S1 and S2, no S3, no rub.  Abdomen: soft, " non-tender, no palpable bladder, present bowel sounds to auscultation ×4.  No guarding or rigidity.  Extremities: no clubbing, cyanosis.  Expected gravity dependent edema bilateral lower extremities.  Postsurgical scarring noted to the anterior bilateral knees.  There is mild tenderness on medial/lateral joint line tenderness of the knees.  Yasmine/Mackenzie sign negative.  Normal dorsiflexion/plantarflexion of the feet..  Neuro: normal speech and mental status.  Cranial nerves II through XII intact.  No anosmia. DTR 2+; proprioception intact.  No focal motor/sensory deficits.      Does the patient have evidence of cognitive impairment?   Mild delay in memory            HEALTH RISK ASSESSMENT    Smoking Status:  Social History     Tobacco Use   Smoking Status Never    Passive exposure: Never   Smokeless Tobacco Never     Alcohol Consumption:  Social History     Substance and Sexual Activity   Alcohol Use No     Fall Risk Screen:    ERINADI Fall Risk Assessment was completed, and patient is at HIGH risk for falls. Assessment completed on:3/4/2024    Depression Screening:      3/4/2024     4:07 PM   PHQ-2/PHQ-9 Depression Screening   Little Interest or Pleasure in Doing Things 0-->not at all   Feeling Down, Depressed or Hopeless 0-->not at all   PHQ-9: Brief Depression Severity Measure Score 0       Health Habits and Functional and Cognitive Screening:      3/4/2024     4:08 PM   Functional & Cognitive Status   Do you have difficulty preparing food and eating? No   Do you have difficulty bathing yourself, getting dressed or grooming yourself? Yes   Do you have difficulty using the toilet? No   Do you have difficulty moving around from place to place? No   Do you have trouble with steps or getting out of a bed or a chair? No   Current Diet Well Balanced Diet   Dental Exam Up to date   Eye Exam Up to date   Exercise (times per week) 3 times per week        Exercise Frequency Comment in therapy   Current Exercises Include  Walking   Do you need help using the phone?  No   Are you deaf or do you have serious difficulty hearing?  No   Do you need help to go to places out of walking distance? No   Do you need help shopping? Yes   Do you need help preparing meals?  No   Do you need help with housework?  No   Do you need help with laundry? Yes   Do you need help taking your medications? No   Do you need help managing money? No   Do you ever drive or ride in a car without wearing a seat belt? No   Have you felt unusual stress, anger or loneliness in the last month? No   Who do you live with? Child   If you need help, do you have trouble finding someone available to you? No   Have you been bothered in the last four weeks by sexual problems? No   Do you have difficulty concentrating, remembering or making decisions? Yes       Age-appropriate Screening Schedule:  Refer to the list below for future screening recommendations based on patient's age, sex and/or medical conditions. Orders for these recommended tests are listed in the plan section. The patient has been provided with a written plan.    Health Maintenance   Topic Date Due    ZOSTER VACCINE (1 of 2) Never done    RSV Vaccine - Adults (1 - 1-dose 60+ series) Never done    DXA SCAN  06/28/2019    LIPID PANEL  11/03/2020    INFLUENZA VACCINE  08/01/2023    COVID-19 Vaccine (1 - 2023-24 season) Never done    HEMOGLOBIN A1C  07/02/2024    DIABETIC EYE EXAM  08/14/2024    URINE MICROALBUMIN  01/25/2025    BMI FOLLOWUP  01/25/2025    ANNUAL WELLNESS VISIT  03/04/2025    TDAP/TD VACCINES (3 - Td or Tdap) 06/22/2027    Pneumococcal Vaccine 65+  Completed                  CMS Preventative Services Quick Reference  Risk Factors Identified During Encounter:    None Identified    The above risks/problems have been discussed with the patient.  Pertinent information has been shared with the patient in the After Visit Summary.    Diagnoses and all orders for this visit:    1. Medicare annual wellness  visit, subsequent (Primary)    2. At high risk for falls    3. Essential hypertension    4. Chronic diastolic (congestive) heart failure    5. Primary insomnia    6. Arthralgia of both knees    7. Primary osteoarthritis involving multiple joints        Follow Up:   Next Medicare Wellness visit to be scheduled in 1 year.      An After Visit Summary and PPPS were made available to the patient.    VSS, appears HD asymptomatic.    D/C Gabapentin; will start Pregabalin and follow clinically.    Will consider addition of donepezil if memory alertness does not improve after change from gabapentin to pregabalin.    Pt has appt with neurosurgery/spine surgery already planned.    Patient has been erroneously marked as diabetic. Based on the available clinical information, she does not have diabetes and should therefore be excluded from diabetic health maintenance and quality measures for the remainder of the reporting period.     I have discussed age/gender specific preventative healthcare issues in detail with patient today.  I have answered all of the questions.

## 2024-04-01 ENCOUNTER — TELEPHONE (OUTPATIENT)
Dept: SLEEP MEDICINE | Facility: HOSPITAL | Age: 84
End: 2024-04-01
Payer: MEDICARE

## 2024-04-01 NOTE — TELEPHONE ENCOUNTER
Called and left message for patient and advised appointment was going to be at the [facility] Location    [address]

## 2024-04-17 NOTE — PROGRESS NOTES
Chief Complaint  Sleeping Problem and Snoring    Subjective     History of Present Illness:  Seema Burkett is a 83 y.o. female with a history of hypertension, diastolic heart failure, hyperlipidemia, renal disease, restless leg syndrome, and CLIVE.  The patient is referred by Lan Hollins DO with primary care.  Patient recently reported difficulty with insomnia, and requested a new sleep study. She is not using a device at this time. RLS and leg aches will keep her up most of the night. She has not used a device for more than 5 years. She had difficulty with her mask and then she found out she had a recall device.     Further details are as follows:    Repton Scale is (out of 24): Total score: 13     Estimated average amount of sleep per night: 3-4 hours  Number of times she wakes up at night: 1 time and then she is up.  Difficulty falling back asleep: yes  It usually takes minutes to go to sleep.  She feels sleepy upon waking up: yes  Rotating or night shift work: no    Drowsiness/Sleepiness:  She exhibits the following:  excessive daytime sleepiness  excessive daytime fatigue  difficulty driving due to sleepiness- doesn't drive long distances.  Occasional nap    Snoring/Breathing:  She exhibits the following:  loud snoring, quits breathing at night, and awakens with dry mouth    Head Injury:  She exhibits the following:  No    Narcolepsy:  She exhibits the following:  Drifts off frequently    RLS/PLMs:  She describes the following:  discomfort in legs with an urge to move them- she will stay up quite a long time due to leg cramps    Insomnia:  She describes the following:  bothered by pain at night    Parasomnia:  She exhibits the following:  none    Weight:       04/22/24  1302   Weight: 92.2 kg (203 lb 4.8 oz)      Weight change in the last year:  gain: 10 lbs; but has lost 50 lb    The patient's relevant past medical, surgical, family, and social history reviewed and updated in Epic as  appropriate.    Review of Systems    PMH:    Past Medical History:   Diagnosis Date    Angina pectoris     states one isolated instance recently; short duration; following day of house cleaning    Arthritis     Cancer     Cholelithiasis     Chronic diastolic heart failure     pt states it was due to a medication she was taking; work up by Dr. Peter     Closed left arm fracture     GERD (gastroesophageal reflux disease)     Gout     History of echocardiogram     Hyperlipidemia     Hypertension     Infectious viral hepatitis     Kidney disease     Knee pain     left knee pain  - uses cane -23    Mitral valve prolapse     RLS (restless legs syndrome)     Shoulder fracture     left secondary to a fall - approx     Sleep apnea     does not use CPAP    Wears glasses     Wears partial dentures      Past Surgical History:   Procedure Laterality Date    CHOLECYSTECTOMY      open procedure -     EYE SURGERY Bilateral     cateract    HYSTERECTOMY      vaginal hysterectomy    INGUINAL HERNIA REPAIR Left 2023    Procedure: INGUINAL HERNIA REPAIR LAPAROSCOPIC WITH [employer] ROBOT;  Surgeon: Krunal Chu MD;  Location: [facility];  [facility];  Laterality: Left;    JOINT REPLACEMENT      KNEE ARTHROPLASTY Right 10/17/2022    TOTAL HIP ARTHROPLASTY Bilateral     TOTAL KNEE ARTHROPLASTY Left 2023    VARICOSE VEIN SURGERY       OB History          5    Para   5    Term                AB        Living   4         SAB        IAB        Ectopic        Molar        Multiple        Live Births   5              Allergies   Allergen Reactions    Gabapentin (Once-Daily) Hallucinations    Quinine Unknown - Low Severity    Quinine Derivatives Other (See Comments)     FLU LIKE SYMPTOMS       MEDS:  Prior to Admission medications    Medication Sig Start Date End Date Taking? Authorizing Provider   aspirin 81 MG chewable tablet Chew 1 tablet Daily.    Provider, MD Fercho   diazePAM  "(VALIUM) 2 MG tablet TAKE ONE TABLET BY MOUTH EVERY NIGHT AS NEEDED FOR INSOMNIA    Fercho Etienne MD   diclofenac (VOLTAREN) 50 MG EC tablet TAKE 1 TABLET TWICE DAILY 12/22/23   Lan Hollins DO   Docusate Sodium (COLACE) 100 MG/10ML 1 capsule 2 TIMES DAILY (route: oral) 1/17/24   Fercho Etienne MD   DULoxetine (CYMBALTA) 30 MG capsule Take 1 capsule by mouth Daily. 10/19/23   Lan Hollins DO   furosemide (LASIX) 20 MG tablet TAKE 1 TABLET EVERY DAY 12/22/23   Lan Hollins DO   HYDROcodone-acetaminophen (NORCO) 7.5-325 MG per tablet Take 1 tablet by mouth Every 6 (Six) Hours As Needed for Moderate Pain (Pain). 12/26/23   Krunal Chu MD   MAGNESIUM-OXIDE PO Take  by mouth.    Fercho Etienne MD   ondansetron (ZOFRAN) 4 MG tablet Take 1 tablet by mouth.  Patient not taking: Reported on 3/4/2024 1/16/24   Fercho Etienne MD   potassium chloride (K-DUR,KLOR-CON) 10 MEQ CR tablet TAKE 1 TABLET EVERY DAY 12/22/23   Lan Hollins DO   potassium chloride (K-DUR,KLOR-CON) 10 MEQ ER tablet Take 1 tablet by mouth Daily.    Fercho Etienne MD   pramipexole (MIRAPEX) 0.5 MG tablet Take 1 tablet by mouth every night at bedtime. 1/25/24   Lan Hollins DO   pregabalin (LYRICA) 50 MG capsule Take 1 capsule by mouth 2 (Two) Times a Day. 3/4/24   Lan Hollins DO   traZODone (DESYREL) 50 MG tablet Take 1 tablet by mouth Every Night. 1/25/24   Lan Hollins DO   vitamin B-6 (PYRIDOXINE) 50 MG tablet Take 1 tablet by mouth Daily.    Fercho Etienne MD       FH:  Family History   Problem Relation Age of Onset    Cancer Mother     Kidney disease Mother     Heart attack Father     Stroke Father     Cancer Sister     Heart attack Brother     Cancer Son        Objective   Vital Signs:  /90   Pulse 55   Temp 97.9 °F (36.6 °C)   Ht 165.1 cm (65\")   Wt 92.2 kg (203 lb 4.8 oz)   SpO2 97%   BMI 33.83 kg/m²     Patient's (Body mass index is 33.83 kg/m².) " indicates that they are obese (BMI >30) with health related conditions that include obstructive sleep apnea, hypertension, coronary heart disease, and diabetes mellitus . Weight is unchanged. BMI is is above average; BMI management plan is completed. We discussed portion control and increasing exercise.            Physical Exam  Vitals reviewed.   Constitutional:       Appearance: Normal appearance.   HENT:      Head: Normocephalic and atraumatic.      Nose: Nose normal.      Mouth/Throat:      Mouth: Mucous membranes are moist.   Cardiovascular:      Rate and Rhythm: Normal rate and regular rhythm.      Heart sounds: No murmur heard.     No friction rub. No gallop.   Pulmonary:      Effort: Pulmonary effort is normal. No respiratory distress.      Breath sounds: Normal breath sounds. No wheezing or rhonchi.   Neurological:      Mental Status: She is alert and oriented to person, place, and time.   Psychiatric:         Behavior: Behavior normal.       Mallampati Score: III (soft and hard palate and base of uvula visible)    Result Review :              Assessment and Plan  Seema Burkett is a 83 y.o. female with a past medical history of hypertension, diastolic heart failure Stage II, hyperlipidemia, renal disease, restless leg syndrome, and CLIVE who presents to [facility] care.  The patient has also had difficulty with insomnia.  She has not been on PAP therapy for well over 5 years.  She has a [employer] recalled device and was never able to obtain a new one so she gave up.    Has had difficulty with insomnia, excessive daytime sleepiness and fatigue, nonrestorative sleep, and concerns for sleep disordered breathing and obstructive sleep apnea. The patient's symptoms, particularly snoring, witnessed apnea (and history of CLIVE on PAP therapy), stage II diastolic congestive heart failure are concerning for significant sleep disordered breathing and obstructive sleep apnea. We will obtain a home sleep test for  further evaluation.  The patient will return for follow-up and recommendations after test.  I have discussed weight loss as it pertains to obstructive sleep apnea.  I discussed insomnia and noted the standard of care as cognitive behavioral therapy-for insomnia.    Diagnoses and all orders for this visit:    1. Obstructive sleep apnea, adult (Primary)  -     Polysomnography 4 or More Parameters; Future    2. Psychophysiological insomnia  -     Polysomnography 4 or More Parameters; Future    3. Restless leg syndrome  -     Polysomnography 4 or More Parameters; Future  -     Iron Profile; Future    4. Chronic diastolic (congestive) heart failure  -     Polysomnography 4 or More Parameters; Future    5. Essential hypertension  -     Polysomnography 4 or More Parameters; Future    6. Class 1 obesity with body mass index (BMI) of 33.0 to 33.9 in adult, unspecified obesity type, unspecified whether serious comorbidity present    7. Diastolic heart failure, unspecified HF chronicity  -     Iron Profile; Future                 I discussed the consequences of uncontrolled sleep apnea including hypertension, heart disease, diabetes, stroke, and dementia. I further discussed sleep apnea therapeutic options including CPAP, Weight loss, Oral dental appliance, and surgery.         Follow Up  Return for Follow up after study.  Patient was given instructions and counseling regarding her condition or for health maintenance advice. Please see specific information pulled into the AVS if appropriate.     MAYUR Maddox, ACNP-BC  Pulmonology, Critical Care, and Sleep Medicine

## 2024-04-22 ENCOUNTER — OFFICE VISIT (OUTPATIENT)
Dept: SLEEP MEDICINE | Age: 84
End: 2024-04-22
Payer: MEDICARE

## 2024-04-22 VITALS
TEMPERATURE: 97.9 F | DIASTOLIC BLOOD PRESSURE: 90 MMHG | OXYGEN SATURATION: 97 % | HEART RATE: 55 BPM | BODY MASS INDEX: 33.87 KG/M2 | WEIGHT: 203.3 LBS | HEIGHT: 65 IN | SYSTOLIC BLOOD PRESSURE: 140 MMHG

## 2024-04-22 DIAGNOSIS — E66.9 CLASS 1 OBESITY WITH BODY MASS INDEX (BMI) OF 33.0 TO 33.9 IN ADULT, UNSPECIFIED OBESITY TYPE, UNSPECIFIED WHETHER SERIOUS COMORBIDITY PRESENT: ICD-10-CM

## 2024-04-22 DIAGNOSIS — F51.04 PSYCHOPHYSIOLOGICAL INSOMNIA: ICD-10-CM

## 2024-04-22 DIAGNOSIS — I50.30 DIASTOLIC HEART FAILURE, UNSPECIFIED HF CHRONICITY: ICD-10-CM

## 2024-04-22 DIAGNOSIS — I10 ESSENTIAL HYPERTENSION: ICD-10-CM

## 2024-04-22 DIAGNOSIS — I50.32 CHRONIC DIASTOLIC (CONGESTIVE) HEART FAILURE: ICD-10-CM

## 2024-04-22 DIAGNOSIS — G47.33 OBSTRUCTIVE SLEEP APNEA, ADULT: Primary | ICD-10-CM

## 2024-04-22 DIAGNOSIS — G25.81 RESTLESS LEG SYNDROME: ICD-10-CM

## 2024-04-22 PROCEDURE — 1160F RVW MEDS BY RX/DR IN RCRD: CPT | Performed by: NURSE PRACTITIONER

## 2024-04-22 PROCEDURE — 3080F DIAST BP >= 90 MM HG: CPT | Performed by: NURSE PRACTITIONER

## 2024-04-22 PROCEDURE — 99213 OFFICE O/P EST LOW 20 MIN: CPT | Performed by: NURSE PRACTITIONER

## 2024-04-22 PROCEDURE — 3077F SYST BP >= 140 MM HG: CPT | Performed by: NURSE PRACTITIONER

## 2024-04-22 PROCEDURE — 1159F MED LIST DOCD IN RCRD: CPT | Performed by: NURSE PRACTITIONER

## 2024-05-21 ENCOUNTER — HOSPITAL ENCOUNTER (OUTPATIENT)
Dept: SLEEP MEDICINE | Facility: HOSPITAL | Age: 84
End: 2024-05-21
Payer: MEDICARE

## 2024-05-21 DIAGNOSIS — G47.33 OBSTRUCTIVE SLEEP APNEA, ADULT: ICD-10-CM

## 2024-05-21 DIAGNOSIS — F51.04 PSYCHOPHYSIOLOGICAL INSOMNIA: ICD-10-CM

## 2024-05-21 DIAGNOSIS — I10 ESSENTIAL HYPERTENSION: ICD-10-CM

## 2024-05-21 DIAGNOSIS — I50.32 CHRONIC DIASTOLIC (CONGESTIVE) HEART FAILURE: ICD-10-CM

## 2024-05-21 DIAGNOSIS — G25.81 RESTLESS LEG SYNDROME: ICD-10-CM

## 2024-05-21 PROCEDURE — 95811 POLYSOM 6/>YRS CPAP 4/> PARM: CPT

## 2024-05-27 PROCEDURE — 95811 POLYSOM 6/>YRS CPAP 4/> PARM: CPT | Performed by: INTERNAL MEDICINE

## 2024-05-28 DIAGNOSIS — G47.33 OBSTRUCTIVE SLEEP APNEA, ADULT: Primary | ICD-10-CM

## 2024-05-29 ENCOUNTER — TELEPHONE (OUTPATIENT)
Dept: SLEEP MEDICINE | Age: 84
End: 2024-05-29
Payer: MEDICARE

## 2024-05-29 NOTE — TELEPHONE ENCOUNTER
Spoke with pt. About sleep study results. Pt. Verbalized understanding and requested ROCCO Marques. Orders sent and faxed.

## 2024-06-03 DIAGNOSIS — M54.16 CHRONIC LUMBAR RADICULOPATHY: ICD-10-CM

## 2024-06-03 RX ORDER — PREGABALIN 50 MG/1
50 CAPSULE ORAL 2 TIMES DAILY
Qty: 60 CAPSULE | Refills: 2 | Status: SHIPPED | OUTPATIENT
Start: 2024-06-03 | End: 2024-06-04 | Stop reason: SDUPTHER

## 2024-06-03 NOTE — TELEPHONE ENCOUNTER
Rx Refill Note  Requested Prescriptions     Pending Prescriptions Disp Refills    pregabalin (LYRICA) 50 MG capsule [Pharmacy Med Name: pregabalin 50 mg capsule] 60 capsule 2     Sig: TAKE ONE CAPSULE BY MOUTH TWICE DAILY      Last office visit with prescribing clinician: 3/4/2024   Last telemedicine visit with prescribing clinician: Visit date not found   Next office visit with prescribing clinician: 6/4/2024                         Would you like a call back once the refill request has been completed: [] Yes [] No    If the office needs to give you a call back, can they leave a voicemail: [] Yes [] No    Divya Vaughn MA  06/03/24, 16:32 EDT

## 2024-06-04 ENCOUNTER — OFFICE VISIT (OUTPATIENT)
Dept: FAMILY MEDICINE CLINIC | Facility: CLINIC | Age: 84
End: 2024-06-04
Payer: MEDICARE

## 2024-06-04 VITALS
SYSTOLIC BLOOD PRESSURE: 120 MMHG | OXYGEN SATURATION: 99 % | WEIGHT: 197.2 LBS | BODY MASS INDEX: 32.86 KG/M2 | HEIGHT: 65 IN | DIASTOLIC BLOOD PRESSURE: 70 MMHG | HEART RATE: 67 BPM

## 2024-06-04 DIAGNOSIS — I10 ESSENTIAL HYPERTENSION: ICD-10-CM

## 2024-06-04 DIAGNOSIS — Z78.9 IMPAIRED MOBILITY AND ADLS: Primary | ICD-10-CM

## 2024-06-04 DIAGNOSIS — M15.9 PRIMARY OSTEOARTHRITIS INVOLVING MULTIPLE JOINTS: ICD-10-CM

## 2024-06-04 DIAGNOSIS — M54.16 CHRONIC LUMBAR RADICULOPATHY: ICD-10-CM

## 2024-06-04 DIAGNOSIS — S46.812S TRAUMATIC RUPTURE OF SUPRASPINATUS TENDON OF LEFT SHOULDER, SEQUELA: ICD-10-CM

## 2024-06-04 DIAGNOSIS — M75.02 ADHESIVE CAPSULITIS OF LEFT SHOULDER: ICD-10-CM

## 2024-06-04 DIAGNOSIS — I50.32 CHRONIC DIASTOLIC (CONGESTIVE) HEART FAILURE: ICD-10-CM

## 2024-06-04 DIAGNOSIS — Z74.09 IMPAIRED MOBILITY AND ADLS: Primary | ICD-10-CM

## 2024-06-04 PROCEDURE — 1126F AMNT PAIN NOTED NONE PRSNT: CPT | Performed by: FAMILY MEDICINE

## 2024-06-04 PROCEDURE — 3074F SYST BP LT 130 MM HG: CPT | Performed by: FAMILY MEDICINE

## 2024-06-04 PROCEDURE — 99214 OFFICE O/P EST MOD 30 MIN: CPT | Performed by: FAMILY MEDICINE

## 2024-06-04 PROCEDURE — 3078F DIAST BP <80 MM HG: CPT | Performed by: FAMILY MEDICINE

## 2024-06-04 RX ORDER — POTASSIUM CHLORIDE 750 MG/1
10 TABLET, FILM COATED, EXTENDED RELEASE ORAL DAILY
Qty: 90 TABLET | Refills: 2 | Status: SHIPPED | OUTPATIENT
Start: 2024-06-04 | End: 2024-06-04 | Stop reason: SDUPTHER

## 2024-06-04 RX ORDER — POTASSIUM CHLORIDE 750 MG/1
10 TABLET, EXTENDED RELEASE ORAL DAILY
Qty: 90 TABLET | Refills: 3 | Status: SHIPPED | OUTPATIENT
Start: 2024-06-04

## 2024-06-04 RX ORDER — PREGABALIN 50 MG/1
50 CAPSULE ORAL 2 TIMES DAILY
Qty: 60 CAPSULE | Refills: 2 | Status: SHIPPED | OUTPATIENT
Start: 2024-06-04

## 2024-06-04 RX ORDER — HYDROCODONE BITARTRATE AND ACETAMINOPHEN 7.5; 325 MG/1; MG/1
1 TABLET ORAL EVERY 6 HOURS PRN
Qty: 45 TABLET | Refills: 0 | Status: SHIPPED | OUTPATIENT
Start: 2024-06-04

## 2024-06-04 RX ORDER — GABAPENTIN 300 MG/1
1 CAPSULE ORAL
COMMUNITY

## 2024-06-04 NOTE — PROGRESS NOTES
Established Patient        Chief Complaint:   Chief Complaint   Patient presents with    Follow-up     3 month f/u        Seema Burkett is a 83 y.o. female    History of Present Illness:   Here today in scheduled follow-up visit of her chronic lumbar radiculopathy, osteoarthritis of multiple joints, impaired mobility/ADLs, traumatic supraspinatus tendon tear of the left shoulder with resultant adhesive capsulitis, hypertension, chronic diastolic CHF.    Denies chest pain, syncope, palpitations or vertigo.  Denies fever, chills or night sweats.  Maintains an active lifestyle, balanced dietary intake; reports good hydration habits.  Denies hematuria/dysuria.  Denies any BRB/BTS.  No new rashes.  Denies orthopnea, epistaxis or hemoptysis.        Subjective     The following portions of the patient's history were reviewed and updated as appropriate: allergies, current medications, past family history, past medical history, past social history, past surgical history and problem list.    Allergies   Allergen Reactions    Gabapentin (Once-Daily) Hallucinations    Quinine Unknown - Low Severity    Quinine Derivatives Other (See Comments)     FLU LIKE SYMPTOMS       Review of Systems  Constitutional: Negative for fever. Negative for chills, diaphoresis, fatigue and unexpected weight change.   HENT: No dysphagia; no changes to vision/hearing/smell/taste; no epistaxis  Eyes: Negative for redness and visual disturbance.   Respiratory: negative for shortness of breath. Negative for chest pain . Negative for cough and chest tightness.   Cardiovascular: Negative for chest pain and palpitations.   Gastrointestinal: Negative for abdominal distention, abdominal pain and blood in stool.   Endocrine: Negative for cold intolerance and heat intolerance.   Genitourinary: Negative for difficulty urinating, dysuria and frequency.   Musculoskeletal: Chronic arthralgias of bilateral knees.  Impaired ROM to left  "shoulder.  Right arm dominant.  Skin: Postsurgical changes of expected nature.  Neurological: Negative for syncope, weakness and headaches.   Hematological: Negative for adenopathy. Does not bruise/bleed easily.   Psychiatric/Behavioral: Negative for confusion. The patient is not nervous/anxious.    Objective     Physical Exam   Vital Signs: /70   Pulse 67   Ht 165.1 cm (65\")   Wt 89.4 kg (197 lb 3.2 oz)   SpO2 99%   BMI 32.82 kg/m²       Patient's (Body mass index is 32.82 kg/m².) indicates that they are obese (BMI >30) with health related conditions that include hypertension, diabetes mellitus, and osteoarthritis . Weight is improving with lifestyle modifications. BMI is is above average; BMI management plan is completed. We discussed portion control and increasing exercise.      General Appearance: alert, oriented x 3, no acute distress.  Skin: warm and dry.   HEENT: Atraumatic.  pupils round and reactive to light and accommodation, oral mucosa pink and moist.  Nares patent without epistaxis.  External auditory canals are patent tympanic membranes intact.  Neck: supple, no JVD, trachea midline.  No thyromegaly  Lungs: CTA, unlabored breathing effort.  Heart: RRR, normal S1 and S2, no S3, no rub.  Abdomen: soft, non-tender, no palpable bladder, present bowel sounds to auscultation ×4.  No guarding or rigidity.  Extremities: no clubbing, cyanosis.  Expected gravity dependent edema bilateral lower extremities.  Postsurgical scarring noted to the anterior bilateral knees.  There is mild tenderness on medial/lateral joint line tenderness of the knees.  Yasmine/Mackenzie sign negative.  Normal dorsiflexion/plantarflexion of the feet.  Severe impairment to ROM A/P of L shoulder; unable to perform Apley's scratch test; pos [place]'s.   symmetric.  Normal flex/ext at elbows, normal supination/pronation.  Neuro: normal speech and mental status.  Cranial nerves II through XII intact.  No anosmia. DTR 2+; " proprioception intact.  No focal motor/sensory deficits.        Assessment and Plan      Assessment/Plan:   Diagnoses and all orders for this visit:    1. Impaired mobility and ADLs (Primary)    2. Chronic lumbar radiculopathy  -     pregabalin (LYRICA) 50 MG capsule; Take 1 capsule by mouth 2 (Two) Times a Day.  Dispense: 60 capsule; Refill: 2    3. Primary osteoarthritis involving multiple joints    4. Traumatic rupture of supraspinatus tendon of left shoulder, sequela  -     HYDROcodone-acetaminophen (NORCO) 7.5-325 MG per tablet; Take 1 tablet by mouth Every 6 (Six) Hours As Needed for Moderate Pain (Pain).  Dispense: 45 tablet; Refill: 0    5. Adhesive capsulitis of left shoulder  -     HYDROcodone-acetaminophen (NORCO) 7.5-325 MG per tablet; Take 1 tablet by mouth Every 6 (Six) Hours As Needed for Moderate Pain (Pain).  Dispense: 45 tablet; Refill: 0    6. Essential hypertension    7. Chronic diastolic (congestive) heart failure    8. Non-recurrent unilateral inguinal hernia without obstruction or gangrene  -     HYDROcodone-acetaminophen (NORCO) 7.5-325 MG per tablet; Take 1 tablet by mouth Every 6 (Six) Hours As Needed for Moderate Pain (Pain).  Dispense: 45 tablet; Refill: 0    Other orders  -     Discontinue: potassium chloride (K-DUR,KLOR-CON) 10 MEQ ER tablet; Take 1 tablet by mouth Daily.  Dispense: 90 tablet; Refill: 2  -     potassium chloride (KLOR-CON M10) 10 MEQ CR tablet; Take 1 tablet by mouth Daily.  Dispense: 90 tablet; Refill: 3      Keep scheduled appt with orthopedic surgeon, Dr. Nelson, concerning left rotator cuff arthrophathy.    VSS, appears HD asymptomatic.  BP @ goal.    Refill patient's analgesic today, only utilizing when absolutely necessary.  Does not take regularly.    Continue low-sodium/salt dietary intake, as well as frequent weight evaluation.  Maintain appropriate hydration status.    I have refilled pregabalin, continues to realize therapeutic benefit.      Discussion  Summary:    Discussed plan of care in detail with pt today; pt verb understanding and agrees.    I spent 35 minutes caring for [place] on this date of service. This time includes time spent by me in the following activities:preparing for the visit, obtaining and/or reviewing a separately obtained history, performing a medically appropriate examination and/or evaluation , counseling and educating the patient/family/caregiver, ordering medications, tests, or procedures, documenting information in the medical record, and care coordination    I have reviewed and updated all copied forward information, as appropriate.  I attest to the accuracy and relevance of any unchanged information.    Follow up:  Return in about 3 months (around 9/4/2024) for Recheck.     There are no Patient Instructions on file for this visit.    Lan Hollins DO  06/04/24  15:35 EDT

## 2024-06-24 ENCOUNTER — OFFICE VISIT (OUTPATIENT)
Dept: FAMILY MEDICINE CLINIC | Facility: CLINIC | Age: 84
End: 2024-06-24
Payer: MEDICARE

## 2024-06-24 VITALS
WEIGHT: 200.8 LBS | BODY MASS INDEX: 33.45 KG/M2 | OXYGEN SATURATION: 96 % | HEIGHT: 65 IN | DIASTOLIC BLOOD PRESSURE: 82 MMHG | RESPIRATION RATE: 16 BRPM | HEART RATE: 67 BPM | SYSTOLIC BLOOD PRESSURE: 124 MMHG | TEMPERATURE: 97 F

## 2024-06-24 DIAGNOSIS — Z01.818 PRE-OP EXAMINATION: Primary | ICD-10-CM

## 2024-06-24 DIAGNOSIS — I50.32 CHRONIC DIASTOLIC (CONGESTIVE) HEART FAILURE: ICD-10-CM

## 2024-06-24 PROCEDURE — 1126F AMNT PAIN NOTED NONE PRSNT: CPT | Performed by: FAMILY MEDICINE

## 2024-06-24 PROCEDURE — 1159F MED LIST DOCD IN RCRD: CPT | Performed by: FAMILY MEDICINE

## 2024-06-24 PROCEDURE — 99215 OFFICE O/P EST HI 40 MIN: CPT | Performed by: FAMILY MEDICINE

## 2024-06-24 PROCEDURE — 3074F SYST BP LT 130 MM HG: CPT | Performed by: FAMILY MEDICINE

## 2024-06-24 PROCEDURE — 93005 ELECTROCARDIOGRAM TRACING: CPT | Performed by: FAMILY MEDICINE

## 2024-06-24 PROCEDURE — 1160F RVW MEDS BY RX/DR IN RCRD: CPT | Performed by: FAMILY MEDICINE

## 2024-06-24 PROCEDURE — 3079F DIAST BP 80-89 MM HG: CPT | Performed by: FAMILY MEDICINE

## 2024-06-24 NOTE — PROGRESS NOTES
"    Office Note     Name: Seema Burkett  : 1940   MRN: [id number]     Chief Complaint:  Pre-op Exam (Left shoulder surgery  pre-op exam )    Subjective     History of Present Illness:  Seema Burkett is a 83 y.o. female who presents today for evaluation of pre-operative cardiac risk stratification before elective non cardiac surgery.    Planned procedure: Left shoulder arthroplasty    Past anesthesia issues?: no     Patients bleeding risk: no     Patient does not have objections to receiving blood products if needed.    Pertinent history: Preop personal/family history: CLIVE  Hypertension  Heart Failure - EF: 66% per echo 22    FUNCTIONAL STATUS EVALUATION: >4    Moderate to excellent (>4 METS) examples: Climbing a flight of stairs, running, walking up hill, cycling, aerobics, swimming, Jogging 10-minute mile, cycling, golf without cart, walking 4 mph, yard work such shoveling, raking leaves, weeding, pushing a power mower.    Poor (<4 METS) examples: Light work around the house, walk around the house, walking 2 mph, vacuuming, activities of daily living such eating, dressing, bathing.    I have reviewed the following portions of the patient's history and these were updated and discussed with the patient as appropriate: past family history, past medical history, past social history, past surgical history, and problem list.     Objective     Vital Signs  /82   Pulse 67   Temp 97 °F (36.1 °C) (Temporal)   Resp 16   Ht 165.1 cm (65\")   Wt 91.1 kg (200 lb 12.8 oz)   SpO2 96%   BMI 33.41 kg/m²   Estimated body mass index is 33.41 kg/m² as calculated from the following:    Height as of this encounter: 165.1 cm (65\").    Weight as of this encounter: 91.1 kg (200 lb 12.8 oz).    Physical Exam  Vitals reviewed.   Constitutional:       General: She is not in acute distress.     Appearance: Normal appearance. She is obese. She is not ill-appearing.      Comments: In a wheelchair today but " typically ambulates with 4WW and/or cane   HENT:      Head: Normocephalic.   Eyes:      Extraocular Movements: Extraocular movements intact.   Cardiovascular:      Rate and Rhythm: Normal rate and regular rhythm.      Heart sounds: Normal heart sounds. No murmur heard.  Pulmonary:      Effort: Pulmonary effort is normal. No respiratory distress.      Breath sounds: Normal breath sounds. No stridor. No wheezing, rhonchi or rales.   Chest:      Chest wall: No tenderness.   Musculoskeletal:      Left shoulder: Swelling, deformity and tenderness present. Decreased range of motion. Decreased strength.      Right lower leg: No edema.      Left lower leg: No edema.   Neurological:      Mental Status: She is alert and oriented to person, place, and time.      Gait: Gait abnormal.   Psychiatric:         Mood and Affect: Mood normal.         Behavior: Behavior normal.         Thought Content: Thought content normal.         Judgment: Judgment normal.          ECG 12 Lead    Date/Time: 6/24/2024 9:42 AM  Performed by: Los Barroso MD    Authorized by: Los Barroso MD  Comparison: compared with previous ECG from 12/22/2023  Rhythm: sinus rhythm  Rate: normal  Conduction: conduction normal  ST Segments: ST segments normal  T Waves: T waves normal  QRS axis: normal  Other: no other findings    Clinical impression: normal ECG            Assessment and Plan     Diagnoses and all orders for this visit:    1. Pre-op examination (Primary)  -     ECG 12 Lead  -     XR Chest PA & Lateral (In Office)  -     CBC (No Diff)  -     Comprehensive Metabolic Panel    2. Chronic diastolic (congestive) heart failure  -     ECG 12 Lead  -     XR Chest PA & Lateral (In Office)  -     CBC (No Diff)  -     Comprehensive Metabolic Panel      Based on clinical presentation and physical examination, the patient has the following presurgical risk as indicated below.    1-Type of surgery risk: Intermdiate    2-Clinical Cardiac Risk (No risk or minor,  intermediate or major): minor. Cardiac referral needed: No    3-Functional Capacity (poor or moderate / excellent): Good, METS: >4. Cardiac referral needed: No. Pulmonology referral needed: No    4-Revised Cardiac Risk Index (High or low risk): 1 - Class II risk.  6% 30-day risk of death, MI, or cardiac arrest    Patient has >2 of the following risk Fx (>2 high risk):  High-risk surgery (intraperitoneal, intrathoracic,or supra-inguinal vascular procedures)  History of ischemic heart disease  History of congestive heart failure  History of cerebrovascular disease  Preoperative treatment with insulin  Preoperative serum creatinine >2.0 mg/dL    5-Predictors of intubation difficulty: is not anticipated.      Preoperative recommendations:  The patient is deemed to be at 1 - Class II risk.  6% 30-day risk of death, MI, or cardiac arrest  No contraindications for planned surgery.    Invasive hemodynamic monitoring perioperatively at the discretion of anesthesiologist.  Incentive spirometry or deep breathing exercises for pulmonary risk reduction.  Other measures: Consult with hospitalist during admission to manage immediate pre and post-operative phases and to dictate further DVT/PE prophylaxis, pulmonary risk reduction, sepsis / delirium risk reduction and medication management.  Patient is informed of the risk involved in any type of surgery, predicted and unpredicted any-operative complications arising from the surgical procedure itself, anesthesia complications and decompensation of chronic medical conditions.  Appropriate test results as part of the cardiac risk stratification will be provided to the patient to bring to their appointment  Cardiac risk stratification as noted above explained to the patient  Patient counseled that surgery is at the discretion of the surgeon.      Discussion Summary:     Discussed plan of care in detail with patient today. Patient was encouraged to keep me informed of any acute  changes, lack of improvement, or any new concerning symptoms.  Patient is also aware of reasons to seek emergent care. Patient verbalized understanding and agrees with plan of care.    I spent 10 minutes on the separately reported service of [id number]. This time is not included in the time used to support the E/M service also reported today.    This visit was billed based on time.  I spent 40 minutes caring for Seema Burkett on this date of service. This time includes time spent by me in the following activities:preparing for the visit, reviewing tests, obtaining and/or reviewing a separately obtained history, performing a medically appropriate examination and/or evaluation , counseling and educating the patient/family/caregiver, ordering medications, tests, or procedures, referring and communicating with other health care professionals , documenting information in the medical record, independently interpreting results and communicating that information with the patient/family/caregiver, and care coordination.    Follow Up  Return for Next scheduled follow up.    Please note that portions of this note may have been completed with a voice recognition program.     Los Barroso MD, MPH  [facility] [place]

## 2024-06-25 ENCOUNTER — PRE-ADMISSION TESTING (OUTPATIENT)
Dept: PREADMISSION TESTING | Facility: HOSPITAL | Age: 84
End: 2024-06-25
Payer: MEDICARE

## 2024-06-25 ENCOUNTER — TELEPHONE (OUTPATIENT)
Dept: FAMILY MEDICINE CLINIC | Facility: CLINIC | Age: 84
End: 2024-06-25
Payer: MEDICARE

## 2024-06-25 VITALS — BODY MASS INDEX: 33.32 KG/M2 | WEIGHT: 200 LBS | HEIGHT: 65 IN

## 2024-06-25 LAB
DEPRECATED RDW RBC AUTO: 62.1 FL (ref 37–54)
ERYTHROCYTE [DISTWIDTH] IN BLOOD BY AUTOMATED COUNT: 17.7 % (ref 12.3–15.4)
HBA1C MFR BLD: 5.2 % (ref 4.8–5.6)
HCT VFR BLD AUTO: 39.2 % (ref 34–46.6)
HGB BLD-MCNC: 11.9 G/DL (ref 12–15.9)
MCH RBC QN AUTO: 29 PG (ref 26.6–33)
MCHC RBC AUTO-ENTMCNC: 30.4 G/DL (ref 31.5–35.7)
MCV RBC AUTO: 95.4 FL (ref 79–97)
PLATELET # BLD AUTO: 244 10*3/MM3 (ref 140–450)
PMV BLD AUTO: 11.5 FL (ref 6–12)
POTASSIUM SERPL-SCNC: 4.3 MMOL/L (ref 3.5–5.2)
RBC # BLD AUTO: 4.11 10*6/MM3 (ref 3.77–5.28)
WBC NRBC COR # BLD AUTO: 6.35 10*3/MM3 (ref 3.4–10.8)

## 2024-06-25 PROCEDURE — 85027 COMPLETE CBC AUTOMATED: CPT

## 2024-06-25 PROCEDURE — 84132 ASSAY OF SERUM POTASSIUM: CPT

## 2024-06-25 PROCEDURE — 36415 COLL VENOUS BLD VENIPUNCTURE: CPT

## 2024-06-25 PROCEDURE — 83036 HEMOGLOBIN GLYCOSYLATED A1C: CPT

## 2024-06-25 NOTE — PAT
An arrival time for procedure was not provided during PAT visit. If patient had any questions or concerns about their arrival time, they were instructed to contact their surgeon/physician.  Additionally, if the patient referred to an arrival time that was acquired from their my chart account, patient was encouraged to verify that time with their surgeon/physician. Arrival times are NOT provided in Pre Admission Testing Department    PATIENT RECEIVED INSTRUCTIONS ON HOW TO AND WHEN TO USE BENZOYL WASH.     Patient instructed to drink 20 ounces of Gatorade or Gatorlyte (if diabetic) and it needs to be completed 1 hour (for Main OR patients) or 2 hours (scheduled  section & [facility] patients) before given arrival time for procedure (NO RED Gatorade and NO Gatorade Zero).    Patient verbalized understanding.    Patient to apply Chlorhexadine wipes  to surgical area (as instructed) the night before procedure and the AM of procedure. Wipes provided.    Patient viewed general PAT education video as instructed in their preoperative information received from their surgeon.  Patient stated the general PAT education video was viewed in its entirety and survey completed.  Copies of PAT general education handouts (Incentive Spirometry, Meds to Beds Program, Patient Belongings, Pre-op skin preparation instructions, Blood Glucose testing, Visitor policy, Surgery FAQ, Code H) distributed to patient if not printed. Education related to the PAT pass and skin preparation for surgery (if applicable) completed in PAT as a reinforcement to PAT education video. Patient instructed to return PAT pass provided today as well as completed skin preparation sheet (if applicable) on the day of procedure.     Additionally if patient had not viewed video yet but intended to view it at home or in our waiting area, then referred them to the handout with QR code/link provided during PAT visit.  Instructed patient to complete survey after viewing  the video in its entirety.  Encouraged patient/family to read PAT general education handouts thoroughly and notify PAT staff with any questions or concerns. Patient verbalized understanding of all information and priority content.    PATIENT EKG AND RISK ASSESSMENT ON CHART AND IN EPIC

## 2024-07-11 ENCOUNTER — TRANSCRIBE ORDERS (OUTPATIENT)
Dept: ADMINISTRATIVE | Facility: HOSPITAL | Age: 84
End: 2024-07-11
Payer: MEDICARE

## 2024-07-11 DIAGNOSIS — E04.9 NONTOXIC GOITER: Primary | ICD-10-CM

## 2024-07-30 NOTE — PROGRESS NOTES
Sleep Clinic Follow Up Note    Chief Complaint  Follow-up, Sleeping Problem, and Daytime Sleepiness    Subjective     History of Present Illness (from previous encounter on 4/22/2024):  Seema Burkett is a 83 y.o. female with a past medical history of hypertension, diastolic heart failure Stage II, hyperlipidemia, renal disease, restless leg syndrome, and CLIVE who presents to [facility] care.  The patient has also had difficulty with insomnia.  She has not been on PAP therapy for well over 5 years.  She has a [employer] recalled device and was never able to obtain a new one so she gave up.     Has had difficulty with insomnia, excessive daytime sleepiness and fatigue, nonrestorative sleep, and concerns for sleep disordered breathing and obstructive sleep apnea. The patient's symptoms, particularly snoring, witnessed apnea (and history of CLIVE on PAP therapy), stage II diastolic congestive heart failure are concerning for significant sleep disordered breathing and obstructive sleep apnea. We will obtain a home sleep test for further evaluation.  The patient will return for follow-up and recommendations after test.  I have discussed weight loss as it pertains to obstructive sleep apnea.  I discussed insomnia and noted the standard of care as cognitive behavioral therapy-for insomnia. (End copied text).    -In lab polysomnography was obtained on 5/22/2024.  This was a split-night study.  Patient was found with moderate obstructive sleep apnea with an initial AHI of 21.6/H.  Recommendation was for CPAP trial 8/18 cm H2O    Interval History:  Seema Burkett is a 83 y.o. female returns for follow up and compliance of PAP therapy. The patient was last seen on 4/22/2024 by me. Overall the patient feels good with regard to therapy. She feels that the pressure is a little low. She has shoulder pain and needs surgery. She is not able sleep in bed due to the pain at times. The device appears to be working appropriately.  On average the patient sleeps 4-5 hours per night. The patient wakes 1-2 times per night. She requires a wheelchair.     The patient reports the following changes to their medical and medication history since they were last seen:  Shoulder injury after a fall        Further details are as follows:      [place] Scale is (out of 24): Total score: 12     Weight:  Current Weight: 207 lb    Weight change in the last year:  gain: 0 lbs    The patient's relevant past medical, surgical, family, and social history reviewed and updated in Epic as appropriate.    PMH:    Past Medical History:   Diagnosis Date    Angina pectoris     states one isolated instance recently; short duration; following day of house cleaning    Arthritis     Asthma 1 2 09    Mild bilateral perihilar airspace diseaseb    Cancer     Cholelithiasis     Chronic diastolic heart failure     pt states it was due to a medication she was taking; work up by Dr. Peter 2021    Closed left arm fracture     Coronary artery disease 1992    Diverticulosis 09 14 13    ER scan of abdomen & pelvis  Diverticula    Fibromyalgia, primary     GERD (gastroesophageal reflux disease)     Gout     History of echocardiogram     Hyperlipidemia     Hypertension     Infectious viral hepatitis     Kidney disease     Knee pain     left knee pain  - uses cane -12/22/23    Low back pain     Mitral valve prolapse     Myocardial infarction     Renal insufficiency     RLS (restless legs syndrome)     Scoliosis     Shoulder fracture     left secondary to a fall - approx 2021    Sleep apnea     COMPLIANT WITH  CPAP    Tremor     About 2 years    Visual impairment 1960    Glasses    Wears glasses     Wears partial dentures      Past Surgical History:   Procedure Laterality Date    CARDIAC CATHETERIZATION      CHOLECYSTECTOMY      open procedure - 1970's    COLONOSCOPY      EYE SURGERY Bilateral     cateract    FRACTURE SURGERY      6th grade..left arm fractured..surg..[facility]     HERNIA REPAIR      HYSTERECTOMY      vaginal hysterectomy    INGUINAL HERNIA REPAIR Left 2023    Procedure: INGUINAL HERNIA REPAIR LAPAROSCOPIC WITH DAVINCI ROBOT;  Surgeon: Krunal Chu MD;  Location: [facility];  Service: Robotics - DaVinci;  Laterality: Left;    JOINT REPLACEMENT      KNEE ARTHROPLASTY Right 10/17/2022    LYMPH NODE BIOPSY      TOTAL HIP ARTHROPLASTY Bilateral     TOTAL KNEE ARTHROPLASTY Left 2023    TUBAL ABDOMINAL LIGATION      VARICOSE VEIN SURGERY       OB History          5    Para   5    Term                AB        Living   4         SAB        IAB        Ectopic        Molar        Multiple        Live Births   5              Allergies   Allergen Reactions    Gabapentin (Once-Daily) Hallucinations    Quinine Unknown - Low Severity    Quinine Derivatives Other (See Comments)     FLU LIKE SYMPTOMS       MEDS:  Prior to Admission medications    Medication Sig Start Date End Date Taking? Authorizing Provider   diclofenac (VOLTAREN) 50 MG EC tablet TAKE 1 TABLET TWICE DAILY 23   Lan Hollins DO   DULoxetine (CYMBALTA) 30 MG capsule Take 1 capsule by mouth Daily. 10/19/23   Lan Hollins DO   HYDROcodone-acetaminophen (NORCO) 7.5-325 MG per tablet Take 1 tablet by mouth Every 6 (Six) Hours As Needed for Moderate Pain (Pain). 24   Lan Hollins DO   MAGNESIUM-OXIDE PO Take  by mouth.    Provider, MD Fercho   potassium chloride (KLOR-CON M10) 10 MEQ CR tablet Take 1 tablet by mouth Daily. 24   Lan Hollins DO   pregabalin (LYRICA) 50 MG capsule Take 1 capsule by mouth 2 (Two) Times a Day. 24   Lan Hollins DO   traZODone (DESYREL) 50 MG tablet Take 1 tablet by mouth Every Night. 24   Lan Hollins DO         FH:  Family History   Problem Relation Age of Onset    Cancer Mother         Kidney    Kidney disease Mother     Heart attack Father     Stroke Father         Passed while  putting in pace maker     "Cancer Sister         Kidney    Heart disease Sister             Heart attack Brother     Cancer Son         Very agressive low b-cell lymphoma...live 2 months after diagnosis    Early death Son     Heart disease Maternal Grandmother         BOTH SIDES OF MY FAMILY    Arthritis Son     Arthritis Sister        Objective   Vital Signs:  /60   Pulse 67   Temp 96.8 °F (36 °C) (Temporal)   Ht 165.1 cm (65\")   Wt 94 kg (207 lb 3.2 oz)   SpO2 97%   BMI 34.48 kg/m²              Physical Exam  Vitals reviewed.   Constitutional:       Appearance: Normal appearance.   HENT:      Head: Normocephalic and atraumatic.      Nose: Nose normal.      Mouth/Throat:      Mouth: Mucous membranes are moist.   Cardiovascular:      Rate and Rhythm: Normal rate and regular rhythm.      Heart sounds: No murmur heard.     No friction rub. No gallop.   Pulmonary:      Effort: Pulmonary effort is normal. No respiratory distress.      Breath sounds: Normal breath sounds. No wheezing or rhonchi.   Neurological:      Mental Status: She is alert and oriented to person, place, and time.   Psychiatric:         Behavior: Behavior normal.               Result Review :           PAP Report:  AHI: 2.3/h  Days of Usage: 30/ (100%)  Number of Days Greater than 4 hours: 21/ (70%)  Average time (days used): 5 hours 2 minutes  95th Percentile Pressure: 16.1 cmH2O  95th percentile leaks: 23.8 L/min  Settings: Auto CPAP-8/18 cm H2O, EPR full-time, EPR level 2, response standard.       Assessment and Plan  Seema Burkett is a 83 y.o. female who returns for follow-up and compliance of PAP therapy.  The Pap report has been reviewed.  Overall usage is at 100% with compliance at 70%.  The patient averages 5 hours and 2 minutes of therapy.  Sleep apnea is well-controlled with an AHI of 2.3/H.  She has a history of moderate obstructive sleep apnea with an initial AHI of 21.6/H.  Patient does have a moderate leak at 23.8 L/min.  This may " improve with mask fitting/change by [employer]. She is to have shoulder surgery soon, and I have encouraged her to bring her CPAP with her. I will refill the patient's supplies, and I have asked them to return for follow-up and compliance in 1 year or sooner should they have further questions or concerns.  Patient has difficulty with insomnia primarily due to pain which wakes her up and she is a hard time getting back to sleep.    Diagnoses and all orders for this visit:    1. Obstructive sleep apnea, adult (Primary)  -     PAP Therapy    2. Psychophysiological insomnia    3. Class 1 obesity with body mass index (BMI) of 33.0 to 33.9 in adult, unspecified obesity type, unspecified whether serious comorbidity present             The patient continues to use and benefit from PAP therapy.    1. The patient was counseled regarding multimodal approach with healthy nutrition, healthy sleep, regular physical activity, social activities, counseling, and medications. Encouraged to practice lateral sleep position. Avoid alcohol and sedatives close to bedtime.     2.  We will refill supplies x1 year.  Return to clinic 1 year or sooner if symptoms warrant. I have reviewed the results of my evaluation and impression and discussed my recommendations in detail with the patient.           Follow Up  Return in about 1 year (around 8/2/2025) for Annual visit.  Patient was given instructions and counseling regarding her condition or for health maintenance advice. Please see specific information pulled into the AVS if appropriate.       MAYUR Maddox, ACNP-BC  Pulmonology, Critical Care, and Sleep Medicine

## 2024-08-02 ENCOUNTER — OFFICE VISIT (OUTPATIENT)
Dept: SLEEP MEDICINE | Age: 84
End: 2024-08-02
Payer: MEDICARE

## 2024-08-02 VITALS
SYSTOLIC BLOOD PRESSURE: 110 MMHG | TEMPERATURE: 96.8 F | WEIGHT: 207.2 LBS | BODY MASS INDEX: 34.52 KG/M2 | HEART RATE: 67 BPM | OXYGEN SATURATION: 97 % | HEIGHT: 65 IN | DIASTOLIC BLOOD PRESSURE: 60 MMHG

## 2024-08-02 DIAGNOSIS — E66.9 CLASS 1 OBESITY WITH BODY MASS INDEX (BMI) OF 33.0 TO 33.9 IN ADULT, UNSPECIFIED OBESITY TYPE, UNSPECIFIED WHETHER SERIOUS COMORBIDITY PRESENT: ICD-10-CM

## 2024-08-02 DIAGNOSIS — G47.33 OBSTRUCTIVE SLEEP APNEA, ADULT: Primary | ICD-10-CM

## 2024-08-02 DIAGNOSIS — F51.04 PSYCHOPHYSIOLOGICAL INSOMNIA: ICD-10-CM

## 2024-08-15 DIAGNOSIS — M25.562 ARTHRALGIA OF LEFT KNEE: ICD-10-CM

## 2024-08-15 DIAGNOSIS — M17.12 PRIMARY OSTEOARTHRITIS OF LEFT KNEE: ICD-10-CM

## 2024-08-15 DIAGNOSIS — M25.512 ARTHRALGIA OF LEFT SHOULDER REGION: ICD-10-CM

## 2024-08-30 ENCOUNTER — HOSPITAL ENCOUNTER (OUTPATIENT)
Dept: ULTRASOUND IMAGING | Facility: HOSPITAL | Age: 84
Discharge: HOME | End: 2024-08-30
Payer: MEDICARE

## 2024-08-30 DIAGNOSIS — E04.9 NONTOXIC GOITER: ICD-10-CM

## 2024-08-30 PROCEDURE — 76536 US EXAM OF HEAD AND NECK: CPT

## 2024-09-30 ENCOUNTER — OFFICE VISIT (OUTPATIENT)
Dept: FAMILY MEDICINE CLINIC | Facility: CLINIC | Age: 84
End: 2024-09-30
Payer: MEDICARE

## 2024-09-30 VITALS
BODY MASS INDEX: 33.82 KG/M2 | HEART RATE: 76 BPM | HEIGHT: 65 IN | WEIGHT: 203 LBS | DIASTOLIC BLOOD PRESSURE: 70 MMHG | SYSTOLIC BLOOD PRESSURE: 116 MMHG

## 2024-09-30 DIAGNOSIS — M54.16 CHRONIC LUMBAR RADICULOPATHY: Primary | ICD-10-CM

## 2024-09-30 DIAGNOSIS — M19.012 PRIMARY OSTEOARTHRITIS OF LEFT SHOULDER: ICD-10-CM

## 2024-09-30 DIAGNOSIS — M75.02 ADHESIVE CAPSULITIS OF LEFT SHOULDER: ICD-10-CM

## 2024-09-30 DIAGNOSIS — G25.81 RESTLESS LEGS SYNDROME: ICD-10-CM

## 2024-09-30 DIAGNOSIS — I10 ESSENTIAL HYPERTENSION: ICD-10-CM

## 2024-09-30 DIAGNOSIS — M65.351 TRIGGER FINGER, RIGHT LITTLE FINGER: ICD-10-CM

## 2024-09-30 DIAGNOSIS — S46.812S TRAUMATIC RUPTURE OF SUPRASPINATUS TENDON OF LEFT SHOULDER, SEQUELA: ICD-10-CM

## 2024-09-30 DIAGNOSIS — I83.813 VARICOSE VEINS OF BILATERAL LOWER EXTREMITIES WITH PAIN: ICD-10-CM

## 2024-09-30 DIAGNOSIS — I50.32 CHRONIC DIASTOLIC (CONGESTIVE) HEART FAILURE: ICD-10-CM

## 2024-09-30 DIAGNOSIS — M15.0 PRIMARY OSTEOARTHRITIS INVOLVING MULTIPLE JOINTS: ICD-10-CM

## 2024-09-30 RX ORDER — FUROSEMIDE 20 MG
20 TABLET ORAL DAILY
Qty: 90 TABLET | Refills: 1 | Status: SHIPPED | OUTPATIENT
Start: 2024-09-30 | End: 2024-09-30 | Stop reason: SDUPTHER

## 2024-09-30 RX ORDER — PRAMIPEXOLE DIHYDROCHLORIDE 0.5 MG/1
0.5 TABLET ORAL 3 TIMES DAILY
COMMUNITY

## 2024-09-30 RX ORDER — FUROSEMIDE 20 MG
20 TABLET ORAL DAILY
Qty: 90 TABLET | Refills: 1 | Status: SHIPPED | OUTPATIENT
Start: 2024-09-30

## 2024-09-30 RX ORDER — FUROSEMIDE 20 MG
20 TABLET ORAL 2 TIMES DAILY
COMMUNITY
End: 2024-09-30 | Stop reason: SDUPTHER

## 2024-09-30 RX ORDER — MULTIVIT WITH MINERALS/LUTEIN
250 TABLET ORAL DAILY
COMMUNITY

## 2024-09-30 NOTE — PROGRESS NOTES
Established Patient        Chief Complaint:   Chief Complaint   Patient presents with    Back Pain     Lumbar pain    Hypertension    Congestive Heart Failure        Seema Burkett is a 84 y.o. female    History of Present Illness:   Here today in scheduled follow-up visit of her chronic lumbar radiculopathy, hypertension, chronic diastolic CHF, varicose veins of the lower extremities, restless leg syndrome, osteoarthritis involving multiple joints, traumatic rupture of the left supraspinatus tendon/adhesive capsulitis of left shoulder/osteoarthritis of left shoulder    Denies chest pain, syncope, palpitations or vertigo.  Denies fever, chills or night sweats.  Maintains an active lifestyle, balanced dietary intake; reports good hydration habits.  Denies hematuria/dysuria.  Denies any BRB/BTS.  No new rashes.  Denies orthopnea, epistaxis or hemoptysis.    Subjective     The following portions of the patient's history were reviewed and updated as appropriate: allergies, current medications, past family history, past medical history, past social history, past surgical history and problem list.    Allergies   Allergen Reactions    Gabapentin (Once-Daily) Hallucinations    Quinine Unknown - Low Severity    Quinine Derivatives Other (See Comments)     FLU LIKE SYMPTOMS       Review of Systems  Constitutional: Negative for fever. Negative for chills, diaphoresis, fatigue and unexpected weight change.   HENT: No dysphagia; no changes to vision/hearing/smell/taste; no epistaxis  Eyes: Negative for redness and visual disturbance.   Respiratory: negative for shortness of breath. Negative for chest pain . Negative for cough and chest tightness.   Cardiovascular: Negative for chest pain and palpitations.   Gastrointestinal: Negative for abdominal distention, abdominal pain and blood in stool.   Endocrine: Negative for cold intolerance and heat intolerance.   Genitourinary: Negative for difficulty  "urinating, dysuria and frequency.   Musculoskeletal: Chronic arthralgias of bilateral knees.  Impaired ROM to left shoulder.  Right arm dominant.  Skin: Postsurgical changes of expected nature.  Neurological: Negative for syncope, weakness and headaches.   Hematological: Negative for adenopathy. Does not bruise/bleed easily.   Psychiatric/Behavioral: Negative for confusion. The patient is not nervous/anxious.    Objective     Physical Exam   Vital Signs: /70   Pulse 76   Ht 165.1 cm (65\")   Wt 92.1 kg (203 lb)   BMI 33.78 kg/m²       Patient's (Body mass index is 33.78 kg/m².) indicates that they are obese (BMI >30) with health related conditions that include hypertension, diabetes mellitus, and osteoarthritis . Weight is improving with lifestyle modifications. BMI is is above average; BMI management plan is completed. We discussed portion control and increasing exercise.      General Appearance: alert, oriented x 3, no acute distress.  Skin: warm and dry.   HEENT: Atraumatic.  pupils round and reactive to light and accommodation, oral mucosa pink and moist.  Nares patent without epistaxis.  External auditory canals are patent tympanic membranes intact.  Neck: supple, no JVD, trachea midline.  No thyromegaly  Lungs: CTA, unlabored breathing effort.  Heart: RRR, normal S1 and S2, no S3, no rub.  Abdomen: soft, non-tender, no palpable bladder, present bowel sounds to auscultation ×4.  No guarding or rigidity.  Extremities: no clubbing, cyanosis.  Expected gravity dependent edema bilateral lower extremities.  Postsurgical scarring noted to the anterior bilateral knees.  There is mild tenderness on medial/lateral joint line tenderness of the knees.  Yasmine/Mackenzie sign negative.  Normal dorsiflexion/plantarflexion of the feet.  Severe impairment to ROM A/P of L shoulder; unable to perform Apley's scratch test; pos [place]'s.   symmetric.  Normal flex/ext at elbows, normal supination/pronation.  Neuro: " normal speech and mental status.  Cranial nerves II through XII intact.  No anosmia. DTR 2+; proprioception intact.  No focal motor/sensory deficits.    Continue current dose of trazodone.    Assessment and Plan      Assessment/Plan:   Diagnoses and all orders for this visit:    1. Chronic lumbar radiculopathy (Primary)    2. Essential hypertension    3. Chronic diastolic (congestive) heart failure  -     furosemide (LASIX) 20 MG tablet; Take 1 tablet by mouth Daily.  Dispense: 90 tablet; Refill: 1    4. Varicose veins of bilateral lower extremities with pain    5. Restless legs syndrome    6. Primary osteoarthritis involving multiple joints    7. Traumatic rupture of supraspinatus tendon of left shoulder, sequela    8. Adhesive capsulitis of left shoulder    9. Primary osteoarthritis of left shoulder    10. Trigger finger, right little finger    Other orders  -     Discontinue: furosemide (LASIX) 20 MG tablet; Take 1 tablet by mouth Daily.  Dispense: 90 tablet; Refill: 1      Has planned left shoulder reverse arthroplasty 10/11/24; has pre-surgical testing planned for 10/8/24.    VSS, appears HD asymptomatic; no findings of volume overload.  Refilled furosemide today.  Continue low sodium/salt dietary intake, freq wt evaluations.  BP is at goal.  As needed dosing furosemide provided today.      Continue current analgesic regimen.        Discussion Summary:    Discussed plan of care in detail with pt today; pt verb understanding and agrees.    I spent 35 minutes caring for [place] on this date of service. This time includes time spent by me in the following activities:preparing for the visit, performing a medically appropriate examination and/or evaluation , counseling and educating the patient/family/caregiver, ordering medications, tests, or procedures, documenting information in the medical record, and care coordination    I confirm accuracy of unchanged data/findings which have been carried forward from previous  visit, as well as I have updated appropriately those that have changed.      Follow up:  No follow-ups on file.     There are no Patient Instructions on file for this visit.    Lan Hollins DO  10/13/24  14:15 EDT

## 2024-10-07 ENCOUNTER — TRANSCRIBE ORDERS (OUTPATIENT)
Dept: ADMINISTRATIVE | Facility: HOSPITAL | Age: 84
End: 2024-10-07
Payer: MEDICARE

## 2024-10-07 DIAGNOSIS — E04.1 NONTOXIC SINGLE THYROID NODULE: Primary | ICD-10-CM

## 2024-10-13 PROBLEM — M65.351 TRIGGER FINGER, RIGHT LITTLE FINGER: Status: ACTIVE | Noted: 2024-10-13

## 2024-10-13 PROBLEM — M19.012 PRIMARY OSTEOARTHRITIS OF LEFT SHOULDER: Status: ACTIVE | Noted: 2024-10-13

## 2024-10-14 ENCOUNTER — READMISSION MANAGEMENT (OUTPATIENT)
Dept: CALL CENTER | Facility: HOSPITAL | Age: 84
End: 2024-10-14
Payer: MEDICARE

## 2024-10-15 ENCOUNTER — TRANSITIONAL CARE MANAGEMENT TELEPHONE ENCOUNTER (OUTPATIENT)
Dept: CALL CENTER | Facility: HOSPITAL | Age: 84
End: 2024-10-15
Payer: MEDICARE

## 2024-10-15 NOTE — OUTREACH NOTE
Prep Survey      Flowsheet Row Responses   [facility] facility patient discharged from? Non-BH   Is LACE score < 7 ? Non-BH Discharge   Eligibility [facility]   Date of Admission 10/11/24   Date of Discharge 10/14/24   Discharge Disposition Home or Self Care   Discharge diagnosis (LT REVERSE TOTAL SHOULDER ARTHROPLASTY)   Does the patient have one of the following disease processes/diagnoses(primary or secondary)? Total Joint Replacement   Does the patient have Home health ordered? No   Prep survey completed? Yes            Kristen SULLIVAN - Registered Nurse

## 2024-10-15 NOTE — OUTREACH NOTE
Call Center TCM Note      Flowsheet Row Responses   [facility] patient discharged from? Non-   Does the patient have one of the following disease processes/diagnoses(primary or secondary)? Total Joint Replacement   TCM attempt successful? Yes  [VR listing Dorothy Villegas or Blaine]   Call start time [phone]   Call end time [phone]   Discharge diagnosis (LT REVERSE TOTAL SHOULDER ARTHROPLASTY)   Medication alerts for this patient Pt reports she was discharged on stool softener and 81mg ASA   Meds reviewed with patient/caregiver? Yes   Does the patient have all medications ordered at discharge? Yes   Is the patient taking all medications as directed (includes completed medication regime)? Yes   Medication comments Pt reports that she has a few HYDROcodone at home for pain.   Comments Pt is eligible for TCM appt within 14 days of [place]. Pt reports that she is going to inpt rehab at [facility] today.   Does the patient have an appointment with their PCP within 7-14 days of discharge? No   Nursing Interventions Patient declined scheduling/rescheduling appointment at this time   What is the Home health agency?  HH was being arranged per pt report as there was no determination from her insurance at [place] yesterday on whether she was approved for inpt rehab.   Has home health visited the patient within 72 hours of discharge? Unsure   Home health comments Pt has been approved to go to rehab by insurance this morning, she reports that she received a call from [employer] alerting her of the approval. Pt reports that she ishoping to go to the [facility].Pt thinks that she has a room on hold, her dtr works at this facility.   Comments Left arm in sling, pt reports that she is unable to perform any tasks independently, from dressing, toileting, getting food due to wearing the sling. Pt's pain rated 4/10 at this time, per pt report. Tolerating po, no N/V pt reports. Pt reports that she is urinating WNL, has had no BM since 10-11-24. Pt  reports that she uses Miralax and stool softener, her normal bowel routine is having a stooling once weekly. Incision is covered by dressing. RN educated pt to monitor for s/sx of infection daily by looking in mirror or having someone assess daily for s/sx of infection, pt v/u.   Did the patient receive a copy of their discharge instructions? Yes   Nursing interventions Reviewed instructions with patient   What is the patient's perception of their health status since discharge? Improving   Is the patient/caregiver able to teach back signs and symptoms related to disease process for when to call PCP? Yes   Is the patient/caregiver able to teach back signs and symptoms related to disease process for when to call 911? Yes   Is the patient/caregiver able to teach back the hierarchy of who to call/visit for symptoms/problems? PCP, Specialist, Home health nurse, Urgent Care, ED, 911 Yes   If the patient is a current smoker, are they able to teach back resources for cessation? Not a smoker   TCM call completed? Yes   Call end time [id number]            Dalia Hensley RN    10/15/2024, 09:53 EDT

## 2024-10-25 DIAGNOSIS — M54.16 CHRONIC LUMBAR RADICULOPATHY: ICD-10-CM

## 2024-10-25 NOTE — TELEPHONE ENCOUNTER
Rx Refill Note  Requested Prescriptions     Pending Prescriptions Disp Refills    pregabalin (LYRICA) 50 MG capsule [Pharmacy Med Name: pregabalin 50 mg capsule] 60 capsule 2     Sig: TAKE ONE CAPSULE BY MOUTH TWICE DAILY      Last office visit with prescribing clinician: 9/30/2024   Last telemedicine visit with prescribing clinician: Visit date not found   Next office visit with prescribing clinician: 12/10/2024                         Would you like a call back once the refill request has been completed: [] Yes [] No    If the office needs to give you a call back, can they leave a voicemail: [] Yes [] No    Rhona Pizarro CMA  10/25/24, 14:42 EDT

## 2024-10-26 RX ORDER — PREGABALIN 50 MG/1
50 CAPSULE ORAL 2 TIMES DAILY
Qty: 60 CAPSULE | Refills: 2 | Status: SHIPPED | OUTPATIENT
Start: 2024-10-26

## 2024-11-29 DIAGNOSIS — M25.512 ARTHRALGIA OF LEFT SHOULDER REGION: ICD-10-CM

## 2024-11-29 DIAGNOSIS — M17.12 PRIMARY OSTEOARTHRITIS OF LEFT KNEE: ICD-10-CM

## 2024-11-29 DIAGNOSIS — M25.562 ARTHRALGIA OF LEFT KNEE: ICD-10-CM

## 2025-01-25 DIAGNOSIS — M54.16 CHRONIC LUMBAR RADICULOPATHY: ICD-10-CM

## 2025-01-28 RX ORDER — PREGABALIN 50 MG/1
50 CAPSULE ORAL 2 TIMES DAILY
Qty: 60 CAPSULE | Refills: 2 | Status: SHIPPED | OUTPATIENT
Start: 2025-01-28

## 2025-02-12 ENCOUNTER — APPOINTMENT (OUTPATIENT)
Dept: CT IMAGING | Facility: HOSPITAL | Age: 85
End: 2025-02-12
Payer: MEDICARE

## 2025-02-12 ENCOUNTER — APPOINTMENT (OUTPATIENT)
Dept: GENERAL RADIOLOGY | Facility: HOSPITAL | Age: 85
End: 2025-02-12
Payer: MEDICARE

## 2025-02-12 ENCOUNTER — HOSPITAL ENCOUNTER (OUTPATIENT)
Facility: HOSPITAL | Age: 85
Setting detail: OBSERVATION
Discharge: HOME HEALTH | End: 2025-02-14
Attending: STUDENT IN AN ORGANIZED HEALTH CARE EDUCATION/TRAINING PROGRAM | Admitting: FAMILY MEDICINE
Payer: MEDICARE

## 2025-02-12 DIAGNOSIS — Z78.9 IMPAIRED MOBILITY AND ADLS: ICD-10-CM

## 2025-02-12 DIAGNOSIS — Z74.09 IMPAIRED MOBILITY AND ADLS: ICD-10-CM

## 2025-02-12 DIAGNOSIS — J96.01 ACUTE HYPOXIC RESPIRATORY FAILURE: Primary | ICD-10-CM

## 2025-02-12 DIAGNOSIS — I50.32 CHRONIC DIASTOLIC (CONGESTIVE) HEART FAILURE: ICD-10-CM

## 2025-02-12 DIAGNOSIS — J10.1 INFLUENZA A: ICD-10-CM

## 2025-02-12 LAB
ALBUMIN SERPL-MCNC: 3.3 G/DL (ref 3.5–5.2)
ALBUMIN/GLOB SERPL: 1.1 G/DL
ALP SERPL-CCNC: 82 U/L (ref 39–117)
ALT SERPL W P-5'-P-CCNC: 16 U/L (ref 1–33)
ANION GAP SERPL CALCULATED.3IONS-SCNC: 12.6 MMOL/L (ref 5–15)
AST SERPL-CCNC: 36 U/L (ref 1–32)
ATMOSPHERIC PRESS: 726 MMHG
BASE EXCESS BLDV CALC-SCNC: 4.8 MMOL/L (ref 0–2)
BASOPHILS # BLD AUTO: 0.01 10*3/MM3 (ref 0–0.2)
BASOPHILS NFR BLD AUTO: 0.1 % (ref 0–1.5)
BDY SITE: ABNORMAL
BILIRUB SERPL-MCNC: 0.3 MG/DL (ref 0–1.2)
BUN SERPL-MCNC: 15 MG/DL (ref 8–23)
BUN/CREAT SERPL: 21.1 (ref 7–25)
CALCIUM SPEC-SCNC: 8.4 MG/DL (ref 8.6–10.5)
CHLORIDE SERPL-SCNC: 96 MMOL/L (ref 98–107)
CK SERPL-CCNC: 115 U/L (ref 20–180)
CO2 SERPL-SCNC: 23.4 MMOL/L (ref 22–29)
COHGB MFR BLD: 1 % (ref 0–5)
CREAT SERPL-MCNC: 0.71 MG/DL (ref 0.57–1)
CRP SERPL-MCNC: 13.21 MG/DL (ref 0–0.5)
D DIMER PPP FEU-MCNC: 1.98 MCGFEU/ML (ref 0–0.82)
D-LACTATE SERPL-SCNC: 1.3 MMOL/L (ref 0.5–2)
DEPRECATED RDW RBC AUTO: 45.2 FL (ref 37–54)
EGFRCR SERPLBLD CKD-EPI 2021: 85 ML/MIN/1.73
EOSINOPHIL # BLD AUTO: 0 10*3/MM3 (ref 0–0.4)
EOSINOPHIL NFR BLD AUTO: 0 % (ref 0.3–6.2)
ERYTHROCYTE [DISTWIDTH] IN BLOOD BY AUTOMATED COUNT: 13.8 % (ref 12.3–15.4)
FLUAV RNA RESP QL NAA+PROBE: DETECTED
FLUBV RNA RESP QL NAA+PROBE: NOT DETECTED
GEN 5 1HR TROPONIN T REFLEX: 24 NG/L
GLOBULIN UR ELPH-MCNC: 3 GM/DL
GLUCOSE SERPL-MCNC: 103 MG/DL (ref 65–99)
HCO3 BLDV-SCNC: 29.6 MMOL/L (ref 22–28)
HCT VFR BLD AUTO: 33.2 % (ref 34–46.6)
HGB BLD-MCNC: 10.9 G/DL (ref 12–15.9)
IMM GRANULOCYTES # BLD AUTO: 0.02 10*3/MM3 (ref 0–0.05)
IMM GRANULOCYTES NFR BLD AUTO: 0.3 % (ref 0–0.5)
INHALED O2 CONCENTRATION: 21 %
LYMPHOCYTES # BLD AUTO: 1.68 10*3/MM3 (ref 0.7–3.1)
LYMPHOCYTES NFR BLD AUTO: 24.2 % (ref 19.6–45.3)
Lab: ABNORMAL
Lab: ABNORMAL
MAGNESIUM SERPL-MCNC: 1.7 MG/DL (ref 1.6–2.4)
MCH RBC QN AUTO: 29.3 PG (ref 26.6–33)
MCHC RBC AUTO-ENTMCNC: 32.8 G/DL (ref 31.5–35.7)
MCV RBC AUTO: 89.2 FL (ref 79–97)
METHGB BLD QL: 0.9 % (ref 0–3)
MODALITY: ABNORMAL
MONOCYTES # BLD AUTO: 0.5 10*3/MM3 (ref 0.1–0.9)
MONOCYTES NFR BLD AUTO: 7.2 % (ref 5–12)
NEUTROPHILS NFR BLD AUTO: 4.74 10*3/MM3 (ref 1.7–7)
NEUTROPHILS NFR BLD AUTO: 68.2 % (ref 42.7–76)
NOTIFIED BY: ABNORMAL
NOTIFIED WHO: ABNORMAL
NRBC BLD AUTO-RTO: 0 /100 WBC (ref 0–0.2)
NT-PROBNP SERPL-MCNC: 620.2 PG/ML (ref 0–1800)
OXYHGB MFR BLDV: 25 % (ref 40–70)
PCO2 BLDV: 44 MM HG (ref 40–50)
PH BLDV: 7.44 PH UNITS (ref 7.32–7.42)
PLATELET # BLD AUTO: 145 10*3/MM3 (ref 140–450)
PMV BLD AUTO: 12.6 FL (ref 6–12)
PO2 BLDV: 17.9 MM HG (ref 30–50)
POTASSIUM SERPL-SCNC: 3.8 MMOL/L (ref 3.5–5.2)
PROCALCITONIN SERPL-MCNC: 0.1 NG/ML (ref 0–0.25)
PROT SERPL-MCNC: 6.3 G/DL (ref 6–8.5)
RBC # BLD AUTO: 3.72 10*6/MM3 (ref 3.77–5.28)
SAO2 % BLDCOV: 25.5 % (ref 45–75)
SARS-COV-2 RNA RESP QL NAA+PROBE: NOT DETECTED
SODIUM SERPL-SCNC: 132 MMOL/L (ref 136–145)
TROPONIN T % DELTA: -8
TROPONIN T NUMERIC DELTA: -2 NG/L
TROPONIN T SERPL HS-MCNC: 26 NG/L
VENTILATOR MODE: ABNORMAL
WBC NRBC COR # BLD AUTO: 6.95 10*3/MM3 (ref 3.4–10.8)

## 2025-02-12 PROCEDURE — 82550 ASSAY OF CK (CPK): CPT | Performed by: STUDENT IN AN ORGANIZED HEALTH CARE EDUCATION/TRAINING PROGRAM

## 2025-02-12 PROCEDURE — G0378 HOSPITAL OBSERVATION PER HR: HCPCS

## 2025-02-12 PROCEDURE — 94640 AIRWAY INHALATION TREATMENT: CPT

## 2025-02-12 PROCEDURE — 87636 SARSCOV2 & INF A&B AMP PRB: CPT | Performed by: STUDENT IN AN ORGANIZED HEALTH CARE EDUCATION/TRAINING PROGRAM

## 2025-02-12 PROCEDURE — 84484 ASSAY OF TROPONIN QUANT: CPT | Performed by: STUDENT IN AN ORGANIZED HEALTH CARE EDUCATION/TRAINING PROGRAM

## 2025-02-12 PROCEDURE — 25010000002 MAGNESIUM SULFATE 2 GM/50ML SOLUTION: Performed by: STUDENT IN AN ORGANIZED HEALTH CARE EDUCATION/TRAINING PROGRAM

## 2025-02-12 PROCEDURE — 71045 X-RAY EXAM CHEST 1 VIEW: CPT

## 2025-02-12 PROCEDURE — 25010000002 METHYLPREDNISOLONE PER 125 MG: Performed by: STUDENT IN AN ORGANIZED HEALTH CARE EDUCATION/TRAINING PROGRAM

## 2025-02-12 PROCEDURE — 85379 FIBRIN DEGRADATION QUANT: CPT | Performed by: STUDENT IN AN ORGANIZED HEALTH CARE EDUCATION/TRAINING PROGRAM

## 2025-02-12 PROCEDURE — 83880 ASSAY OF NATRIURETIC PEPTIDE: CPT | Performed by: STUDENT IN AN ORGANIZED HEALTH CARE EDUCATION/TRAINING PROGRAM

## 2025-02-12 PROCEDURE — 96375 TX/PRO/DX INJ NEW DRUG ADDON: CPT

## 2025-02-12 PROCEDURE — 84145 PROCALCITONIN (PCT): CPT | Performed by: STUDENT IN AN ORGANIZED HEALTH CARE EDUCATION/TRAINING PROGRAM

## 2025-02-12 PROCEDURE — 83735 ASSAY OF MAGNESIUM: CPT | Performed by: STUDENT IN AN ORGANIZED HEALTH CARE EDUCATION/TRAINING PROGRAM

## 2025-02-12 PROCEDURE — 25510000001 IOPAMIDOL 61 % SOLUTION: Performed by: STUDENT IN AN ORGANIZED HEALTH CARE EDUCATION/TRAINING PROGRAM

## 2025-02-12 PROCEDURE — 93005 ELECTROCARDIOGRAM TRACING: CPT | Performed by: STUDENT IN AN ORGANIZED HEALTH CARE EDUCATION/TRAINING PROGRAM

## 2025-02-12 PROCEDURE — 87040 BLOOD CULTURE FOR BACTERIA: CPT | Performed by: STUDENT IN AN ORGANIZED HEALTH CARE EDUCATION/TRAINING PROGRAM

## 2025-02-12 PROCEDURE — 85025 COMPLETE CBC W/AUTO DIFF WBC: CPT | Performed by: STUDENT IN AN ORGANIZED HEALTH CARE EDUCATION/TRAINING PROGRAM

## 2025-02-12 PROCEDURE — 71275 CT ANGIOGRAPHY CHEST: CPT

## 2025-02-12 PROCEDURE — 36415 COLL VENOUS BLD VENIPUNCTURE: CPT

## 2025-02-12 PROCEDURE — 82820 HEMOGLOBIN-OXYGEN AFFINITY: CPT

## 2025-02-12 PROCEDURE — 99291 CRITICAL CARE FIRST HOUR: CPT | Performed by: STUDENT IN AN ORGANIZED HEALTH CARE EDUCATION/TRAINING PROGRAM

## 2025-02-12 PROCEDURE — 86140 C-REACTIVE PROTEIN: CPT | Performed by: STUDENT IN AN ORGANIZED HEALTH CARE EDUCATION/TRAINING PROGRAM

## 2025-02-12 PROCEDURE — 99285 EMERGENCY DEPT VISIT HI MDM: CPT

## 2025-02-12 PROCEDURE — 80053 COMPREHEN METABOLIC PANEL: CPT | Performed by: STUDENT IN AN ORGANIZED HEALTH CARE EDUCATION/TRAINING PROGRAM

## 2025-02-12 PROCEDURE — 96365 THER/PROPH/DIAG IV INF INIT: CPT

## 2025-02-12 PROCEDURE — 83605 ASSAY OF LACTIC ACID: CPT | Performed by: STUDENT IN AN ORGANIZED HEALTH CARE EDUCATION/TRAINING PROGRAM

## 2025-02-12 PROCEDURE — 82805 BLOOD GASES W/O2 SATURATION: CPT

## 2025-02-12 RX ORDER — METHYLPREDNISOLONE SODIUM SUCCINATE 125 MG/2ML
125 INJECTION, POWDER, LYOPHILIZED, FOR SOLUTION INTRAMUSCULAR; INTRAVENOUS ONCE
Status: COMPLETED | OUTPATIENT
Start: 2025-02-12 | End: 2025-02-12

## 2025-02-12 RX ORDER — IPRATROPIUM BROMIDE AND ALBUTEROL SULFATE 2.5; .5 MG/3ML; MG/3ML
3 SOLUTION RESPIRATORY (INHALATION) ONCE
Status: COMPLETED | OUTPATIENT
Start: 2025-02-12 | End: 2025-02-12

## 2025-02-12 RX ORDER — ACETAMINOPHEN 325 MG/1
650 TABLET ORAL ONCE
Status: COMPLETED | OUTPATIENT
Start: 2025-02-12 | End: 2025-02-12

## 2025-02-12 RX ORDER — AZITHROMYCIN 250 MG/1
500 TABLET, FILM COATED ORAL ONCE
Status: COMPLETED | OUTPATIENT
Start: 2025-02-12 | End: 2025-02-13

## 2025-02-12 RX ORDER — IOPAMIDOL 612 MG/ML
100 INJECTION, SOLUTION INTRAVASCULAR
Status: COMPLETED | OUTPATIENT
Start: 2025-02-12 | End: 2025-02-12

## 2025-02-12 RX ORDER — MAGNESIUM SULFATE HEPTAHYDRATE 40 MG/ML
2 INJECTION, SOLUTION INTRAVENOUS ONCE
Status: COMPLETED | OUTPATIENT
Start: 2025-02-12 | End: 2025-02-12

## 2025-02-12 RX ORDER — OSELTAMIVIR PHOSPHATE 75 MG/1
75 CAPSULE ORAL EVERY 12 HOURS SCHEDULED
Status: DISCONTINUED | OUTPATIENT
Start: 2025-02-12 | End: 2025-02-14 | Stop reason: HOSPADM

## 2025-02-12 RX ADMIN — IPRATROPIUM BROMIDE AND ALBUTEROL SULFATE 3 ML: .5; 3 SOLUTION RESPIRATORY (INHALATION) at 19:28

## 2025-02-12 RX ADMIN — METHYLPREDNISOLONE SODIUM SUCCINATE 125 MG: 125 INJECTION, POWDER, FOR SOLUTION INTRAMUSCULAR; INTRAVENOUS at 19:48

## 2025-02-12 RX ADMIN — ACETAMINOPHEN 650 MG: 325 TABLET, FILM COATED ORAL at 20:40

## 2025-02-12 RX ADMIN — IOPAMIDOL 100 ML: 612 INJECTION, SOLUTION INTRAVENOUS at 22:45

## 2025-02-12 RX ADMIN — MAGNESIUM SULFATE HEPTAHYDRATE 2 G: 40 INJECTION, SOLUTION INTRAVENOUS at 19:50

## 2025-02-13 LAB
ANION GAP SERPL CALCULATED.3IONS-SCNC: 13 MMOL/L (ref 5–15)
BUN SERPL-MCNC: 14 MG/DL (ref 8–23)
BUN/CREAT SERPL: 20 (ref 7–25)
CALCIUM SPEC-SCNC: 8.7 MG/DL (ref 8.6–10.5)
CHLORIDE SERPL-SCNC: 101 MMOL/L (ref 98–107)
CO2 SERPL-SCNC: 23 MMOL/L (ref 22–29)
CREAT SERPL-MCNC: 0.7 MG/DL (ref 0.57–1)
DEPRECATED RDW RBC AUTO: 44.2 FL (ref 37–54)
EGFRCR SERPLBLD CKD-EPI 2021: 86.5 ML/MIN/1.73
ERYTHROCYTE [DISTWIDTH] IN BLOOD BY AUTOMATED COUNT: 13.7 % (ref 12.3–15.4)
GLUCOSE SERPL-MCNC: 218 MG/DL (ref 65–99)
HCT VFR BLD AUTO: 33.9 % (ref 34–46.6)
HGB BLD-MCNC: 11.3 G/DL (ref 12–15.9)
MCH RBC QN AUTO: 29.2 PG (ref 26.6–33)
MCHC RBC AUTO-ENTMCNC: 33.3 G/DL (ref 31.5–35.7)
MCV RBC AUTO: 87.6 FL (ref 79–97)
PLATELET # BLD AUTO: 85 10*3/MM3 (ref 140–450)
PMV BLD AUTO: 13.4 FL (ref 6–12)
POTASSIUM SERPL-SCNC: 3.6 MMOL/L (ref 3.5–5.2)
POTASSIUM SERPL-SCNC: 4 MMOL/L (ref 3.5–5.2)
RBC # BLD AUTO: 3.87 10*6/MM3 (ref 3.77–5.28)
SODIUM SERPL-SCNC: 137 MMOL/L (ref 136–145)
WBC NRBC COR # BLD AUTO: 7.25 10*3/MM3 (ref 3.4–10.8)

## 2025-02-13 PROCEDURE — 94660 CPAP INITIATION&MGMT: CPT

## 2025-02-13 PROCEDURE — 94618 PULMONARY STRESS TESTING: CPT

## 2025-02-13 PROCEDURE — 96372 THER/PROPH/DIAG INJ SC/IM: CPT

## 2025-02-13 PROCEDURE — 85027 COMPLETE CBC AUTOMATED: CPT | Performed by: FAMILY MEDICINE

## 2025-02-13 PROCEDURE — 94761 N-INVAS EAR/PLS OXIMETRY MLT: CPT

## 2025-02-13 PROCEDURE — G0378 HOSPITAL OBSERVATION PER HR: HCPCS

## 2025-02-13 PROCEDURE — 25010000002 HEPARIN (PORCINE) PER 1000 UNITS: Performed by: FAMILY MEDICINE

## 2025-02-13 PROCEDURE — 94664 DEMO&/EVAL PT USE INHALER: CPT

## 2025-02-13 PROCEDURE — 80048 BASIC METABOLIC PNL TOTAL CA: CPT | Performed by: FAMILY MEDICINE

## 2025-02-13 PROCEDURE — 96367 TX/PROPH/DG ADDL SEQ IV INF: CPT

## 2025-02-13 PROCEDURE — 97162 PT EVAL MOD COMPLEX 30 MIN: CPT

## 2025-02-13 PROCEDURE — 97165 OT EVAL LOW COMPLEX 30 MIN: CPT

## 2025-02-13 PROCEDURE — 94799 UNLISTED PULMONARY SVC/PX: CPT

## 2025-02-13 PROCEDURE — 84132 ASSAY OF SERUM POTASSIUM: CPT | Performed by: INTERNAL MEDICINE

## 2025-02-13 PROCEDURE — 25010000002 CEFTRIAXONE PER 250 MG: Performed by: STUDENT IN AN ORGANIZED HEALTH CARE EDUCATION/TRAINING PROGRAM

## 2025-02-13 PROCEDURE — 99222 1ST HOSP IP/OBS MODERATE 55: CPT | Performed by: FAMILY MEDICINE

## 2025-02-13 RX ORDER — POLYETHYLENE GLYCOL 3350 17 G/17G
17 POWDER, FOR SOLUTION ORAL DAILY PRN
Status: DISCONTINUED | OUTPATIENT
Start: 2025-02-13 | End: 2025-02-14 | Stop reason: HOSPADM

## 2025-02-13 RX ORDER — FUROSEMIDE 20 MG/1
20 TABLET ORAL DAILY
Status: DISCONTINUED | OUTPATIENT
Start: 2025-02-13 | End: 2025-02-14 | Stop reason: HOSPADM

## 2025-02-13 RX ORDER — LIDOCAINE 4 G/G
1 PATCH TOPICAL
Status: DISCONTINUED | OUTPATIENT
Start: 2025-02-13 | End: 2025-02-14 | Stop reason: HOSPADM

## 2025-02-13 RX ORDER — NITROGLYCERIN 0.4 MG/1
0.4 TABLET SUBLINGUAL
Status: DISCONTINUED | OUTPATIENT
Start: 2025-02-13 | End: 2025-02-14 | Stop reason: HOSPADM

## 2025-02-13 RX ORDER — TRAZODONE HYDROCHLORIDE 50 MG/1
50 TABLET, FILM COATED ORAL NIGHTLY PRN
Status: DISCONTINUED | OUTPATIENT
Start: 2025-02-13 | End: 2025-02-14 | Stop reason: HOSPADM

## 2025-02-13 RX ORDER — AMOXICILLIN 250 MG
2 CAPSULE ORAL 2 TIMES DAILY PRN
Status: DISCONTINUED | OUTPATIENT
Start: 2025-02-13 | End: 2025-02-14 | Stop reason: HOSPADM

## 2025-02-13 RX ORDER — SODIUM CHLORIDE 9 MG/ML
40 INJECTION, SOLUTION INTRAVENOUS AS NEEDED
Status: DISCONTINUED | OUTPATIENT
Start: 2025-02-13 | End: 2025-02-14 | Stop reason: HOSPADM

## 2025-02-13 RX ORDER — IPRATROPIUM BROMIDE AND ALBUTEROL SULFATE 2.5; .5 MG/3ML; MG/3ML
3 SOLUTION RESPIRATORY (INHALATION)
Status: DISCONTINUED | OUTPATIENT
Start: 2025-02-13 | End: 2025-02-13

## 2025-02-13 RX ORDER — PREGABALIN 50 MG/1
50 CAPSULE ORAL 2 TIMES DAILY
Status: DISCONTINUED | OUTPATIENT
Start: 2025-02-13 | End: 2025-02-14 | Stop reason: HOSPADM

## 2025-02-13 RX ORDER — BUDESONIDE 0.5 MG/2ML
0.5 INHALANT ORAL
Status: DISCONTINUED | OUTPATIENT
Start: 2025-02-13 | End: 2025-02-14 | Stop reason: HOSPADM

## 2025-02-13 RX ORDER — PRAMIPEXOLE DIHYDROCHLORIDE 0.25 MG/1
0.25 TABLET ORAL 3 TIMES DAILY
Status: DISCONTINUED | OUTPATIENT
Start: 2025-02-13 | End: 2025-02-14 | Stop reason: HOSPADM

## 2025-02-13 RX ORDER — ACETAMINOPHEN 325 MG/1
650 TABLET ORAL EVERY 4 HOURS PRN
Status: DISCONTINUED | OUTPATIENT
Start: 2025-02-13 | End: 2025-02-14 | Stop reason: HOSPADM

## 2025-02-13 RX ORDER — POTASSIUM CHLORIDE 1500 MG/1
40 TABLET, EXTENDED RELEASE ORAL EVERY 4 HOURS
Status: COMPLETED | OUTPATIENT
Start: 2025-02-13 | End: 2025-02-13

## 2025-02-13 RX ORDER — BISACODYL 10 MG
10 SUPPOSITORY, RECTAL RECTAL DAILY PRN
Status: DISCONTINUED | OUTPATIENT
Start: 2025-02-13 | End: 2025-02-14 | Stop reason: HOSPADM

## 2025-02-13 RX ORDER — ALUMINA, MAGNESIA, AND SIMETHICONE 2400; 2400; 240 MG/30ML; MG/30ML; MG/30ML
15 SUSPENSION ORAL EVERY 6 HOURS PRN
Status: DISCONTINUED | OUTPATIENT
Start: 2025-02-13 | End: 2025-02-14 | Stop reason: HOSPADM

## 2025-02-13 RX ORDER — PRAMIPEXOLE DIHYDROCHLORIDE 0.25 MG/1
0.25 TABLET ORAL 3 TIMES DAILY
Status: DISCONTINUED | OUTPATIENT
Start: 2025-02-13 | End: 2025-02-13

## 2025-02-13 RX ORDER — ONDANSETRON 2 MG/ML
4 INJECTION INTRAMUSCULAR; INTRAVENOUS EVERY 6 HOURS PRN
Status: DISCONTINUED | OUTPATIENT
Start: 2025-02-13 | End: 2025-02-14 | Stop reason: HOSPADM

## 2025-02-13 RX ORDER — HYDROCODONE BITARTRATE AND ACETAMINOPHEN 7.5; 325 MG/1; MG/1
1 TABLET ORAL EVERY 12 HOURS PRN
Status: DISCONTINUED | OUTPATIENT
Start: 2025-02-13 | End: 2025-02-14 | Stop reason: HOSPADM

## 2025-02-13 RX ORDER — SODIUM CHLORIDE 0.9 % (FLUSH) 0.9 %
10 SYRINGE (ML) INJECTION AS NEEDED
Status: DISCONTINUED | OUTPATIENT
Start: 2025-02-13 | End: 2025-02-14 | Stop reason: HOSPADM

## 2025-02-13 RX ORDER — DULOXETIN HYDROCHLORIDE 30 MG/1
30 CAPSULE, DELAYED RELEASE ORAL DAILY
Status: DISCONTINUED | OUTPATIENT
Start: 2025-02-13 | End: 2025-02-14 | Stop reason: HOSPADM

## 2025-02-13 RX ORDER — IPRATROPIUM BROMIDE AND ALBUTEROL SULFATE 2.5; .5 MG/3ML; MG/3ML
3 SOLUTION RESPIRATORY (INHALATION)
Status: DISCONTINUED | OUTPATIENT
Start: 2025-02-13 | End: 2025-02-14 | Stop reason: HOSPADM

## 2025-02-13 RX ORDER — SODIUM CHLORIDE 0.9 % (FLUSH) 0.9 %
10 SYRINGE (ML) INJECTION EVERY 12 HOURS SCHEDULED
Status: DISCONTINUED | OUTPATIENT
Start: 2025-02-13 | End: 2025-02-14 | Stop reason: HOSPADM

## 2025-02-13 RX ORDER — HEPARIN SODIUM 5000 [USP'U]/ML
5000 INJECTION, SOLUTION INTRAVENOUS; SUBCUTANEOUS EVERY 12 HOURS SCHEDULED
Status: DISCONTINUED | OUTPATIENT
Start: 2025-02-13 | End: 2025-02-14 | Stop reason: HOSPADM

## 2025-02-13 RX ORDER — ACETAMINOPHEN 160 MG/5ML
650 SOLUTION ORAL EVERY 4 HOURS PRN
Status: DISCONTINUED | OUTPATIENT
Start: 2025-02-13 | End: 2025-02-14 | Stop reason: HOSPADM

## 2025-02-13 RX ORDER — POTASSIUM CHLORIDE 750 MG/1
10 CAPSULE, EXTENDED RELEASE ORAL DAILY
Status: DISCONTINUED | OUTPATIENT
Start: 2025-02-13 | End: 2025-02-14 | Stop reason: HOSPADM

## 2025-02-13 RX ORDER — ONDANSETRON 4 MG/1
4 TABLET, ORALLY DISINTEGRATING ORAL EVERY 6 HOURS PRN
Status: DISCONTINUED | OUTPATIENT
Start: 2025-02-13 | End: 2025-02-14 | Stop reason: HOSPADM

## 2025-02-13 RX ORDER — BISACODYL 5 MG/1
5 TABLET, DELAYED RELEASE ORAL DAILY PRN
Status: DISCONTINUED | OUTPATIENT
Start: 2025-02-13 | End: 2025-02-14 | Stop reason: HOSPADM

## 2025-02-13 RX ORDER — ACETAMINOPHEN 650 MG/1
650 SUPPOSITORY RECTAL EVERY 4 HOURS PRN
Status: DISCONTINUED | OUTPATIENT
Start: 2025-02-13 | End: 2025-02-14 | Stop reason: HOSPADM

## 2025-02-13 RX ADMIN — BUDESONIDE INHALATION 0.5 MG: 0.5 SUSPENSION RESPIRATORY (INHALATION) at 19:46

## 2025-02-13 RX ADMIN — PRAMIPEXOLE DIHYDROCHLORIDE 0.25 MG: 0.25 TABLET ORAL at 15:59

## 2025-02-13 RX ADMIN — PRAMIPEXOLE DIHYDROCHLORIDE 0.25 MG: 0.25 TABLET ORAL at 21:00

## 2025-02-13 RX ADMIN — SODIUM CHLORIDE 2000 MG: 9 INJECTION, SOLUTION INTRAVENOUS at 00:05

## 2025-02-13 RX ADMIN — PRAMIPEXOLE DIHYDROCHLORIDE 0.25 MG: 0.25 TABLET ORAL at 09:57

## 2025-02-13 RX ADMIN — PREGABALIN 50 MG: 50 CAPSULE ORAL at 21:00

## 2025-02-13 RX ADMIN — LIDOCAINE 1 PATCH: 4 PATCH TOPICAL at 12:20

## 2025-02-13 RX ADMIN — POTASSIUM CHLORIDE 40 MEQ: 1500 TABLET, EXTENDED RELEASE ORAL at 15:59

## 2025-02-13 RX ADMIN — HEPARIN SODIUM 5000 UNITS: 5000 INJECTION INTRAVENOUS; SUBCUTANEOUS at 01:56

## 2025-02-13 RX ADMIN — FUROSEMIDE 20 MG: 20 TABLET ORAL at 09:57

## 2025-02-13 RX ADMIN — HEPARIN SODIUM 5000 UNITS: 5000 INJECTION INTRAVENOUS; SUBCUTANEOUS at 09:58

## 2025-02-13 RX ADMIN — DULOXETINE HYDROCHLORIDE 30 MG: 30 CAPSULE, DELAYED RELEASE ORAL at 09:57

## 2025-02-13 RX ADMIN — Medication 10 ML: at 09:58

## 2025-02-13 RX ADMIN — TRAZODONE HYDROCHLORIDE 50 MG: 50 TABLET ORAL at 21:00

## 2025-02-13 RX ADMIN — IPRATROPIUM BROMIDE AND ALBUTEROL SULFATE 3 ML: 2.5; .5 SOLUTION RESPIRATORY (INHALATION) at 19:45

## 2025-02-13 RX ADMIN — AZITHROMYCIN DIHYDRATE 500 MG: 250 TABLET ORAL at 00:07

## 2025-02-13 RX ADMIN — Medication 10 ML: at 21:01

## 2025-02-13 RX ADMIN — IPRATROPIUM BROMIDE AND ALBUTEROL SULFATE 3 ML: 2.5; .5 SOLUTION RESPIRATORY (INHALATION) at 12:51

## 2025-02-13 RX ADMIN — OSELTAMIVIR PHOSPHATE 75 MG: 75 CAPSULE ORAL at 00:07

## 2025-02-13 RX ADMIN — POTASSIUM CHLORIDE 40 MEQ: 1500 TABLET, EXTENDED RELEASE ORAL at 11:43

## 2025-02-13 RX ADMIN — POTASSIUM CHLORIDE 10 MEQ: 750 CAPSULE, EXTENDED RELEASE ORAL at 09:57

## 2025-02-13 RX ADMIN — OSELTAMIVIR PHOSPHATE 75 MG: 75 CAPSULE ORAL at 21:00

## 2025-02-13 RX ADMIN — IPRATROPIUM BROMIDE AND ALBUTEROL SULFATE 3 ML: 2.5; .5 SOLUTION RESPIRATORY (INHALATION) at 07:40

## 2025-02-13 RX ADMIN — OSELTAMIVIR PHOSPHATE 75 MG: 75 CAPSULE ORAL at 09:57

## 2025-02-13 RX ADMIN — PREGABALIN 50 MG: 50 CAPSULE ORAL at 09:57

## 2025-02-13 RX ADMIN — PREGABALIN 50 MG: 50 CAPSULE ORAL at 01:56

## 2025-02-13 RX ADMIN — PRAMIPEXOLE DIHYDROCHLORIDE 0.25 MG: 0.25 TABLET ORAL at 01:56

## 2025-02-13 RX ADMIN — TRAZODONE HYDROCHLORIDE 50 MG: 50 TABLET ORAL at 01:56

## 2025-02-13 RX ADMIN — HEPARIN SODIUM 5000 UNITS: 5000 INJECTION INTRAVENOUS; SUBCUTANEOUS at 21:00

## 2025-02-13 RX ADMIN — Medication 10 ML: at 01:56

## 2025-02-13 NOTE — PLAN OF CARE
Goal Outcome Evaluation:  Plan of Care Reviewed With: patient        Progress: no change  Outcome Evaluation: OT eval completed. Patient is supine in bed, son present at bedside. Patient is Ox4, denies pain at rest. Patient lives with her two sons in a one level home with 4 steps to enter. Patient reports she walks with a RW in the home, uses a cane for community. Patient normally sleeps on the couch, has a tub with shower chair, a BSC, rollator and lift recliner. Patient had left shoulder surgery 4 months ago and was scheduled to have her right shoulder replaced tomorrow. Patient reports increased difficulty at home with all self care. Patient performed supine to sit with CGA, sit to stand CGA and walked 6' x 2 using RW with CGA. Patient requires min-mod A overall for ADLs. Patient is expected to benefit from continued OT services prior to DC and would benefit from STR to address generalized weakness as well as decline in ADLs and mobility.    Anticipated Discharge Disposition (OT): inpatient rehabilitation facility, sub acute care setting

## 2025-02-13 NOTE — CASE MANAGEMENT/SOCIAL WORK
Continued Stay Note  CARMEL Marques     Patient Name: Seema Burkett  MRN: [id number]  Today's Date: 2/13/2025    Admit Date: 2/12/2025    Plan: home with family   Discharge Plan       Row Name 02/13/25 1627       Plan    Plan Comments sent [facility]  a message to look over for [facility]                   Discharge Codes    No documentation.                       Zenaida Florentino RN

## 2025-02-13 NOTE — ED PROVIDER NOTES
Subjective:  History of Present Illness:    Patient is an 84-year-old female with history of asthma, CHF, CAD, fibromyalgia, GERD, hypertension, hyperlipidemia, CKD, restless leg syndrome, sleep apnea who presents today with shortness of breath.  Reports multiple sick contacts at home, she has felt ill for the last 2 days.  Reports that today she became acutely dyspneic and requested EMS.  Found to be hypoxic on their arrival and started on supplemental oxygen.  She now presents to emergency department for evaluation.  Endorsing chills and fever at home.  Tmax of 101.  Denies any abdominal pain but has had nausea and vomiting.  Denies diarrhea.  No dysuria.  Denies any unilateral leg swelling or leg pain.  No personal history of PE/DVT.      Nurses Notes reviewed and agree, including vitals, allergies, social history and prior medical history.     REVIEW OF SYSTEMS: All systems reviewed and not pertinent unless noted.  Review of Systems   Constitutional:  Positive for activity change, chills, fatigue and fever. Negative for appetite change.   HENT:  Positive for congestion and rhinorrhea. Negative for sinus pressure and sinus pain.    Eyes:  Negative for discharge and itching.   Respiratory:  Positive for cough and shortness of breath.    Cardiovascular:  Negative for chest pain and leg swelling.   Gastrointestinal:  Positive for nausea and vomiting. Negative for abdominal distention, abdominal pain and diarrhea.   Endocrine: Negative for cold intolerance and heat intolerance.   Genitourinary:  Negative for decreased urine volume, difficulty urinating, flank pain, frequency, urgency, vaginal bleeding, vaginal discharge and vaginal pain.   Musculoskeletal:  Negative for gait problem, neck pain and neck stiffness.   Skin:  Negative for color change.   Allergic/Immunologic: Negative for environmental allergies.   Neurological:  Negative for seizures, syncope, facial asymmetry and speech difficulty.    Psychiatric/Behavioral:  Negative for self-injury and suicidal ideas.        Past Medical History:   Diagnosis Date    Angina pectoris     states one isolated instance recently; short duration; following day of house cleaning    Arthritis     Asthma 1 2 09    Mild bilateral perihilar airspace diseaseb    Cancer     Cholelithiasis     Chronic diastolic heart failure     pt states it was due to a medication she was taking; work up by Dr. Peter 2021    Closed left arm fracture     Coronary artery disease 1992    Diverticulosis 09 14 13    ER scan of abdomen & pelvis  Diverticula    Fibromyalgia, primary     GERD (gastroesophageal reflux disease)     Gout     History of echocardiogram     Hyperlipidemia     Hypertension     Infectious viral hepatitis     Kidney disease     Knee pain     left knee pain  - uses cane -12/22/23    Low back pain     Mitral valve prolapse     Myocardial infarction     Renal insufficiency     RLS (restless legs syndrome)     Scoliosis     Shoulder fracture     left secondary to a fall - approx 2021    Sleep apnea     COMPLIANT WITH  CPAP    Tremor     About 2 years    Visual impairment 1960    Glasses    Wears glasses     Wears partial dentures        Allergies:    Gabapentin (once-daily), Quinine, and Quinine derivatives      Past Surgical History:   Procedure Laterality Date    CARDIAC CATHETERIZATION      CHOLECYSTECTOMY      open procedure - 1970's    COLONOSCOPY      EYE SURGERY Bilateral     cateract    FRACTURE SURGERY      6th grade..left arm fractured..surg..[facility]    HERNIA REPAIR  2024    HYSTERECTOMY      vaginal hysterectomy    INGUINAL HERNIA REPAIR Left 12/26/2023    Procedure: INGUINAL HERNIA REPAIR LAPAROSCOPIC WITH [employer] [facility][id number] ROBOT;  Surgeon: Krunal Chu MD;  Location: [facility];  Service: Robotics - [employer];  Laterality: Left;    JOINT REPLACEMENT      KNEE ARTHROPLASTY Right 10/17/2022    LYMPH NODE BIOPSY      TOTAL HIP ARTHROPLASTY Bilateral     TOTAL  KNEE ARTHROPLASTY Left 2023    TUBAL ABDOMINAL LIGATION      VARICOSE VEIN SURGERY           Social History     Socioeconomic History    Marital status:    Tobacco Use    Smoking status: Never     Passive exposure: Never    Smokeless tobacco: Never   Vaping Use    Vaping status: Never Used   Substance and Sexual Activity    Alcohol use: Never    Drug use: Never    Sexual activity: Not Currently     Partners: Female     Birth control/protection: Diaphragm, Depo-provera     Comment: Me...1985...Hysterectomy         Family History   Problem Relation Age of Onset    Cancer Mother         Kidney    Kidney disease Mother     Heart attack Father     Stroke Father         Passed while  putting in pace maker    Cancer Sister         Kidney    Heart disease Sister             Heart attack Brother     Cancer Son         Very agressive low b-cell lymphoma...live 2 months after diagnosis    Early death Son     Heart disease Maternal Grandmother         BOTH SIDES OF MY FAMILY    Arthritis Son     Arthritis Sister        Objective  Physical Exam:  There were no vitals taken for this visit.     Physical Exam  Constitutional:       General: She is not in acute distress.     Appearance: Normal appearance. She is ill-appearing.   HENT:      Head: Normocephalic and atraumatic.      Nose: Nose normal. Congestion and rhinorrhea present.      Mouth/Throat:      Mouth: Mucous membranes are dry.      Pharynx: Oropharynx is clear. No oropharyngeal exudate or posterior oropharyngeal erythema.   Eyes:      Extraocular Movements: Extraocular movements intact.      Conjunctiva/sclera: Conjunctivae normal.      Pupils: Pupils are equal, round, and reactive to light.   Cardiovascular:      Rate and Rhythm: Normal rate and regular rhythm.      Pulses: Normal pulses.      Heart sounds: Normal heart sounds.   Pulmonary:      Effort: Pulmonary effort is normal. No respiratory distress.      Breath sounds: Normal breath sounds.    Abdominal:      General: Abdomen is flat. Bowel sounds are normal. There is no distension.      Palpations: Abdomen is soft.      Tenderness: There is no abdominal tenderness. There is no right CVA tenderness, left CVA tenderness, guarding or rebound.   Musculoskeletal:         General: No swelling or tenderness. Normal range of motion.      Cervical back: Normal range of motion and neck supple.      Right lower leg: No edema.      Left lower leg: No edema.   Skin:     General: Skin is warm and dry.      Capillary Refill: Capillary refill takes less than 2 seconds.   Neurological:      General: No focal deficit present.      Mental Status: She is alert and oriented to person, place, and time. Mental status is at baseline.      Cranial Nerves: No cranial nerve deficit.      Sensory: No sensory deficit.      Motor: No weakness.      Coordination: Coordination normal.   Psychiatric:         Mood and Affect: Mood normal.         Behavior: Behavior normal.         Thought Content: Thought content normal.         Judgment: Judgment normal.         Critical Care    Performed by: Kai Paulson MD  Authorized by: Kai Paulson MD    Critical care provider statement:     Critical care time (minutes):  30    Critical care time was exclusive of:  Separately billable procedures and treating other patients    Critical care was necessary to treat or prevent imminent or life-threatening deterioration of the following conditions:  Respiratory failure    Critical care was time spent personally by me on the following activities:  Blood draw for specimens, development of treatment plan with patient or surrogate, discussions with primary provider, evaluation of patient's response to treatment, examination of patient, obtaining history from patient or surrogate, ordering and performing treatments and interventions, ordering and review of radiographic studies, ordering and review of laboratory studies, pulse oximetry,  re-evaluation of patient's condition and review of old charts    Care discussed with: admitting provider        ED Course:    ED Course as of 02/12/25 2351 Wed Feb 12, 2025   1937 EKG interpreted by me, normal sinus rhythm no concerning ST changes noted, rate of 75 [JE]   2203 Chest x-ray independently interpreted by me showing no acute process [JE]      ED Course User Index  [JE] Kai Paulson MD       Lab Results (last 24 hours)       ** No results found for the last 24 hours. **             No radiology results from the last 24 hrs       MDM     Amount and/or Complexity of Data Reviewed  Independent visualization of images, tracings, or specimens: yes        Initial impression of presenting illness: Shortness of breath    DDX: includes but is not limited to: Asthma exacerbation, bacterial pneumonia, influenza, viral URI, PE    Patient arrives guarded with vitals interpreted by myself.     Pertinent features from physical exam: Rales throughout on exam, regular rate and rhythm, no murmur, nontender to abdominal palpation.    Initial diagnostic plan: CBC, CMP, troponin, BNP, EKG, chest x-ray, CRP, Pro-David, magnesium, D-dimer    Results from initial plan were reviewed and interpreted by me revealing possible Bactrim pneumonia seen on CT imaging, patient test positive for influenza    Diagnostic information from other sources: Reviewed past medical records and discussed with EMS on arrival    Interventions / Re-evaluation: Given Solu-Medrol, DuoNeb, magnesium also covered with Rocephin, azithromycin for CAP coverage, given Tamiflu due to concerns for influenza    Results/clinical rationale were discussed with patient at bedside    Consultations/Discussion of results with other physicians: Given my concern for acute hypoxic respiratory failure secondary to influenza discussed with Dr. Guzman, hospitalist, and admitted to service further management    Disposition plan: Admit  -----        Final diagnoses:    Acute hypoxic respiratory failure          Kai Paulson MD  02/12/25 3732

## 2025-02-13 NOTE — PLAN OF CARE
Problem: Adult Inpatient Plan of Care  Goal: Plan of Care Review  Outcome: Progressing  Flowsheets (Taken 2/13/2025 4774)  Outcome Evaluation: No acute changes during the shift. Patient did work with physical therapy today and the plan is to dischange home possibly tomorrow with home health. Patient has maintained good oxygen saturation on room air, remaining above 90%. Patient expressed some pain in her lower back so a licocaine patch was placed. All questions answered, patient eagar for discharge.   Goal Outcome Evaluation:              Outcome Evaluation: No acute changes during the shift. Patient did work with physical therapy today and the plan is to dischange home possibly tomorrow with home health. Patient has maintained good oxygen saturation on room air, remaining above 90%. Patient expressed some pain in her lower back so a licocaine patch was placed. All questions answered, patient eagar for discharge.

## 2025-02-13 NOTE — CASE MANAGEMENT/SOCIAL WORK
cCase Management/Social Work    Patient Name:  Seema Burkett  YOB: 1940  MRN: [id number]  Admit Date:  2/12/2025        16:28 EST   Discharge Plan       Row Name 02/13/25 1627       Plan    Plan possibly home tomorrow with hh    Plan Comments sent paul/  hh  a message to look over for hh                        Electronically signed by:  Zenaida Florentino RN  02/13/25 16:28 EST

## 2025-02-13 NOTE — PLAN OF CARE
Problem: Noninvasive Ventilation Acute  Goal: Effective Unassisted Ventilation and Oxygenation  Outcome: Progressing   Goal Outcome Evaluation: Patient wearing QHS and PRN.    RT EQUIPMENT DEVICE RELATED - SKIN ASSESSMENT    RT Medical Equipment/Device:  NIV Mask: Under-the-nose  size: b    Skin Assessment: Cheek: Intact    Device Skin Pressure Protection: Pressure points protected    Nurse Notification: Tiffany Andrade, RRT

## 2025-02-13 NOTE — THERAPY EVALUATION
Patient Name: Seema Burkett  : 1940    MRN: [id number]                              Today's Date: 2025       Admit Date: 2025    Visit Dx:     ICD-10-CM ICD-9-CM   1. Acute hypoxic respiratory failure  J96.01 518.81   2. Influenza A  J10.1 487.1   3. Impaired mobility and ADLs  Z74.09 V49.89    Z78.9      Patient Active Problem List   Diagnosis    Arthralgia of both knees    Varicose veins of bilateral lower extremities with pain    Primary osteoarthritis of left knee    Essential hypertension    Chronic diastolic (congestive) heart failure    Restless legs syndrome    Primary insomnia    Obstructive sleep apnea on CPAP    Vitamin B12 deficiency    Traumatic tear of supraspinatus tendon of left shoulder    Fibromyalgia    Idiopathic osteoarthritis    Complicated grieving    Difficulty walking    Muscle weakness    Hx of total knee arthroplasty, right    Impaired mobility and ADLs    Inguinal hernia of left side without obstruction or gangrene    Constipation    Inguinal hernia without obstruction or gangrene    Incarcerated hernia    Primary osteoarthritis involving multiple joints    Chronic lumbar radiculopathy    Adhesive capsulitis of left shoulder    Primary osteoarthritis of left shoulder    Trigger finger, right little finger    Influenza A     Past Medical History:   Diagnosis Date    Angina pectoris     states one isolated instance recently; short duration; following day of house cleaning    Arthritis     Asthma 1 2 09    Mild bilateral perihilar airspace diseaseb    Cancer     Cholelithiasis     Chronic diastolic heart failure     pt states it was due to a medication she was taking; work up by Dr. Peter     Closed left arm fracture     Coronary artery disease     Diverticulosis 09 14 13    ER scan of abdomen & pelvis  Diverticula    Fibromyalgia, primary     GERD (gastroesophageal reflux disease)     Gout     History of echocardiogram     Hyperlipidemia     Hypertension      Infectious viral hepatitis     Kidney disease     Knee pain     left knee pain  - uses cane -12/22/23    Low back pain     Mitral valve prolapse     Myocardial infarction     Renal insufficiency     RLS (restless legs syndrome)     Scoliosis     Shoulder fracture     left secondary to a fall - approx 2021    Sleep apnea     COMPLIANT WITH  CPAP    Tremor     About 2 years    Visual impairment 1960    Glasses    Wears glasses     Wears partial dentures      Past Surgical History:   Procedure Laterality Date    CARDIAC CATHETERIZATION      CHOLECYSTECTOMY      open procedure - 1970's    COLONOSCOPY      EYE SURGERY Bilateral     cateract    FRACTURE SURGERY      6th grade..left arm fractured..surg..[facility]    HERNIA REPAIR  2024    HYSTERECTOMY      vaginal hysterectomy    INGUINAL HERNIA REPAIR Left 12/26/2023    Procedure: INGUINAL HERNIA REPAIR LAPAROSCOPIC WITH orderbolt ROBOT;  Surgeon: Krunal Chu MD;  Location: [facility];  Service: Robotics - Space Apart;  Laterality: Left;    JOINT REPLACEMENT      KNEE ARTHROPLASTY Right 10/17/2022    LYMPH NODE BIOPSY      TOTAL HIP ARTHROPLASTY Bilateral     TOTAL KNEE ARTHROPLASTY Left 02/2023    TUBAL ABDOMINAL LIGATION  2070    VARICOSE VEIN SURGERY        General Information       Row Name 02/13/25 1258          Physical Therapy Time and Intention    Document Type evaluation  -MS     Mode of Treatment physical therapy  -MS       Row Name 02/13/25 1258          General Information    Patient Profile Reviewed yes  -MS     Prior Level of Function independent:;all household mobility;gait  -MS     Existing Precautions/Restrictions fall  -MS     Barriers to Rehab medically complex;previous functional deficit  -MS       Row Name 02/13/25 1258          Living Environment    People in Home child(marizol), adult  -MS       Row Name 02/13/25 1258          Home Main Entrance    Number of Stairs, Main Entrance four  -MS     Stair Railings, Main Entrance railings safe and in  good condition  -MS       Row Name 02/13/25 1258          Stairs Within Home, Primary    Number of Stairs, Within Home, Primary none  -MS       Row Name 02/13/25 1258          Cognition    Orientation Status (Cognition) oriented x 4  -MS       Row Name 02/13/25 1258          Safety Issues/Impairments Affecting Functional Mobility    Safety Issues Affecting Function (Mobility) safety precautions follow-through/compliance;safety precaution awareness  -MS     Impairments Affecting Function (Mobility) balance;endurance/activity tolerance;shortness of breath;strength;range of motion (ROM)  -MS               User Key  (r) = Recorded By, (t) = Taken By, (c) = Cosigned By      Initials Name Provider Type    Brad Mckenna PT Physical Therapist                   Mobility       Row Name 02/13/25 1259          Bed Mobility    Bed Mobility supine-sit  -MS     Supine-Sit [place] (Bed Mobility) contact guard  -MS     Assistive Device (Bed Mobility) bed rails;head of bed elevated  -MS       Row Name 02/13/25 1259          Sit-Stand Transfer    Sit-Stand [place] (Transfers) contact guard  -MS     Assistive Device (Sit-Stand Transfers) walker, front-wheeled  -MS       Row Name 02/13/25 1259          Gait/Stairs (Locomotion)    [place] Level (Gait) contact guard  -MS     Assistive Device (Gait) walker, front-wheeled  -MS     Patient was able to Ambulate yes  -MS     Distance in Feet (Gait) 6  x2  -MS     Deviations/Abnormal Patterns (Gait) festinating/shuffling;gait speed decreased  -MS     Bilateral Gait Deviations forward flexed posture  -MS               User Key  (r) = Recorded By, (t) = Taken By, (c) = Cosigned By      Initials Name Provider Type    Brad Mckenna PT Physical Therapist                   Obj/Interventions       Row Name 02/13/25 1301          Range of Motion Comprehensive    General Range of Motion bilateral lower extremity ROM WFL  -MS       Row Name 02/13/25 1301          Strength  Comprehensive (MMT)    General Manual Muscle Testing (MMT) Assessment lower extremity strength deficits identified  -MS     Comment, General Manual Muscle Testing (MMT) Assessment B LE 4-/5  -MS       Row Name 02/13/25 1301          Sensory Assessment (Somatosensory)    Sensory Assessment (Somatosensory) sensation intact  -MS               User Key  (r) = Recorded By, (t) = Taken By, (c) = Cosigned By      Initials Name Provider Type    MS Brad Maguire, PT Physical Therapist                   Goals/Plan       Row Name 02/13/25 1304          Bed Mobility Goal 1 (PT)    Activity/Assistive Device (Bed Mobility Goal 1, PT) bed mobility activities, all  -MS     [place] Level/Cues Needed (Bed Mobility Goal 1, PT) modified independence  -MS     Time Frame (Bed Mobility Goal 1, PT) long term goal (LTG);2 weeks  -MS       Row Name 02/13/25 1304          Transfer Goal 1 (PT)    Activity/Assistive Device (Transfer Goal 1, PT) transfers, all;sit-to-stand/stand-to-sit  -MS     [place] Level/Cues Needed (Transfer Goal 1, PT) standby assist  -MS     Time Frame (Transfer Goal 1, PT) long term goal (LTG);2 weeks  -MS       Row Name 02/13/25 1304          Gait Training Goal 1 (PT)    Activity/Assistive Device (Gait Training Goal 1, PT) gait (walking locomotion);assistive device use  -MS     [place] Level (Gait Training Goal 1, PT) standby assist  -MS     Distance (Gait Training Goal 1, PT) 100ft  -MS     Time Frame (Gait Training Goal 1, PT) long term goal (LTG);2 weeks  -MS       Row Name 02/13/25 1304          Patient Education Goal (PT)    Activity (Patient Education Goal, PT) ther exer x15 reps ea B LE  -MS     [place]/Cues/Accuracy (Memory Goal 2, PT) demonstrates adequately  -MS     Time Frame (Patient Education Goal, PT) short term goal (STG);1 week  -MS       Row Name 02/13/25 1305          Therapy Assessment/Plan (PT)    Planned Therapy Interventions (PT) balance training;bed mobility training;gait  training;neuromuscular re-education;ROM (range of motion);home exercise program;patient/family education;transfer training;strengthening;stair training  -MS               User Key  (r) = Recorded By, (t) = Taken By, (c) = Cosigned By      Initials Name Provider Type    Brad Mckenna, PT Physical Therapist                   Clinical Impression       Row Name 02/13/25 1301          Pain    Pretreatment Pain Rating 0/10 - no pain  -MS     Posttreatment Pain Rating 0/10 - no pain  -MS       Row Name 02/13/25 1301          Plan of Care Review    Plan of Care Reviewed With patient  -MS     Progress no change  -MS     Outcome Evaluation Pt participated in PT eval this date. Pt reports she lives home with her two sons and uses a Rwx in the home for gait. Pt has a cane she uses for community ambulation. Pt has been having increased diffiulty with household mobility. Pt was CGA for sup-sit t/f. Pt was CGA 6ft x2 with Rwx. Pt very slowed gait. Pt would benefit from continued inpatient PT services and is interested in STR at d/c.  -MS       Row Name 02/13/25 1301          Therapy Assessment/Plan (PT)    Patient/Family Therapy Goals Statement (PT) to go home  -MS     Rehab Potential (PT) good  -MS     Criteria for Skilled Interventions Met (PT) yes;meets criteria  -MS     Therapy Frequency (PT) 5 times/wk  -MS     Predicted Duration of Therapy Intervention (PT) 10 days  -MS       Row Name 02/13/25 1301          Vital Signs    O2 Delivery Pre Treatment room air  -MS     O2 Delivery Intra Treatment room air  -MS     O2 Delivery Post Treatment room air  -MS     Pre Patient Position Supine  -MS     Intra Patient Position Standing  -MS     Post Patient Position Sitting  -MS       Row Name 02/13/25 1301          Positioning and Restraints    Pre-Treatment Position in bed  -MS     Post Treatment Position chair  -MS     In Chair sitting;call light within reach;encouraged to call for assist;exit alarm on  -MS               User Key   (r) = Recorded By, (t) = Taken By, (c) = Cosigned By      Initials Name Provider Type    Brad Mckenna, PT Physical Therapist                   Outcome Measures       Row Name 02/13/25 1305 02/13/25 0800       How much help from another person do you currently need...    Turning from your back to your side while in flat bed without using bedrails? 3  -MS 2  -EB    Moving from lying on back to sitting on the side of a flat bed without bedrails? 3  -MS 2  -EB    Moving to and from a bed to a chair (including a wheelchair)? 3  -MS 2  -EB    Standing up from a chair using your arms (e.g., wheelchair, bedside chair)? 3  -MS 1  -EB    Climbing 3-5 steps with a railing? 3  -MS 1  -EB    To walk in hospital room? 3  -MS 1  -EB    AM-PAC 6 Clicks Score (PT) 18  -MS 9  -EB      Row Name 02/13/25 0140          How much help from another person do you currently need...    Turning from your back to your side while in flat bed without using bedrails? 2  -KT     Moving from lying on back to sitting on the side of a flat bed without bedrails? 2  -KT     Moving to and from a bed to a chair (including a wheelchair)? 2  -KT     Standing up from a chair using your arms (e.g., wheelchair, bedside chair)? 1  -KT     Climbing 3-5 steps with a railing? 1  -KT     To walk in hospital room? 1  -KT     AM-PAC 6 Clicks Score (PT) 9  -KT       Row Name 02/13/25 1254          Functional Assessment    Outcome Measure Options AM-PAC 6 Clicks Daily Activity (OT)  -SD               User Key  (r) = Recorded By, (t) = Taken By, (c) = Cosigned By      Initials Name Provider Type    Josefina Dumas OT Occupational Therapist    Brad Mckenna, PT Physical Therapist    Jessica Espinoza, RN Registered Nurse    Renetta Rodriguez RN Registered Nurse                                 Physical Therapy Education       Title: PT OT SLP Therapies (In Progress)       Topic: Physical Therapy (In Progress)       Point: Mobility training (Done)        Learning Progress Summary            Patient Acceptance, E, VU by MS at 2/13/2025 1306    Comment: importance of mobility                      Point: Home exercise program (Done)       Learning Progress Summary            Patient Acceptance, E, VU by MS at 2/13/2025 1306    Comment: importance of mobility                      Point: Body mechanics (Not Started)       Learner Progress:  Not documented in this visit.              Point: Precautions (Not Started)       Learner Progress:  Not documented in this visit.                              User Key       Initials Effective Dates Name Provider Type Discipline    MS 08/22/23 -  Brad Maguire, PT Physical Therapist PT                  PT Recommendation and Plan  Planned Therapy Interventions (PT): balance training, bed mobility training, gait training, neuromuscular re-education, ROM (range of motion), home exercise program, patient/family education, transfer training, strengthening, stair training  Progress: no change  Outcome Evaluation: Pt participated in PT eval this date. Pt reports she lives home with her two sons and uses a Rwx in the home for gait. Pt has a cane she uses for community ambulation. Pt has been having increased diffiulty with household mobility. Pt was CGA for sup-sit t/f. Pt was CGA 6ft x2 with Rwx. Pt very slowed gait. Pt would benefit from continued inpatient PT services and is interested in STR at d/c.     Time Calculation:   PT Evaluation Complexity  History, PT Evaluation Complexity: 1-2 personal factors and/or comorbidities  Examination of Body Systems (PT Eval Complexity): total of 3 or more elements  Clinical Presentation (PT Evaluation Complexity): evolving  Clinical Decision Making (PT Evaluation Complexity): moderate complexity  Overall Complexity (PT Evaluation Complexity): moderate complexity     PT Charges       Row Name 02/13/25 1306             Time Calculation    Start Time 1051  -MS      PT Received On 02/13/25  -MS       PT Goal Re-Cert Due Date 02/23/25  -MS         Untimed Charges    PT Eval/Re-eval Minutes 42  -MS         Total Minutes    Untimed Charges Total Minutes 42  -MS       Total Minutes 42  -MS                User Key  (r) = Recorded By, (t) = Taken By, (c) = Cosigned By      Initials Name Provider Type    Brad Mckenna, PT Physical Therapist                  Therapy Charges for Today       Code Description Service Date Service Provider Modifiers Qty    [id number] HC PT EVAL MOD COMPLEXITY 3 2/13/2025 Brad Maguire, PT GP 1            PT G-Codes  Outcome Measure Options: AM-PAC 6 Clicks Daily Activity (OT)  AM-PAC 6 Clicks Score (PT): 18  AM-PAC 6 Clicks Score (OT): 15  PT Discharge Summary  Anticipated Discharge Disposition (PT): inpatient rehabilitation facility, skilled nursing facility    Brad Maguire PT  2/13/2025

## 2025-02-13 NOTE — PLAN OF CARE
Goal Outcome Evaluation:  Plan of Care Reviewed With: patient        Progress: no change  Outcome Evaluation: Pt participated in PT eval this date. Pt reports she lives home with her two sons and uses a Rwx in the home for gait. Pt has a cane she uses for community ambulation. Pt has been having increased diffiulty with household mobility. Pt was CGA for sup-sit t/f. Pt was CGA 6ft x2 with Rwx. Pt very slowed gait. Pt would benefit from continued inpatient PT services and is interested in STR at d/c.    Anticipated Discharge Disposition (PT): inpatient rehabilitation facility, skilled nursing facility

## 2025-02-13 NOTE — THERAPY EVALUATION
Patient Name: Seema Burkett  : 1940    MRN: [id number]                              Today's Date: 2025       Admit Date: 2025    Visit Dx:     ICD-10-CM ICD-9-CM   1. Acute hypoxic respiratory failure  J96.01 518.81   2. Influenza A  J10.1 487.1   3. Impaired mobility and ADLs  Z74.09 V49.89    Z78.9      Patient Active Problem List   Diagnosis    Arthralgia of both knees    Varicose veins of bilateral lower extremities with pain    Primary osteoarthritis of left knee    Essential hypertension    Chronic diastolic (congestive) heart failure    Restless legs syndrome    Primary insomnia    Obstructive sleep apnea on CPAP    Vitamin B12 deficiency    Traumatic tear of supraspinatus tendon of left shoulder    Fibromyalgia    Idiopathic osteoarthritis    Complicated grieving    Difficulty walking    Muscle weakness    Hx of total knee arthroplasty, right    Impaired mobility and ADLs    Inguinal hernia of left side without obstruction or gangrene    Constipation    Inguinal hernia without obstruction or gangrene    Incarcerated hernia    Primary osteoarthritis involving multiple joints    Chronic lumbar radiculopathy    Adhesive capsulitis of left shoulder    Primary osteoarthritis of left shoulder    Trigger finger, right little finger    Influenza A     Past Medical History:   Diagnosis Date    Angina pectoris     states one isolated instance recently; short duration; following day of house cleaning    Arthritis     Asthma 1 2 09    Mild bilateral perihilar airspace diseaseb    Cancer     Cholelithiasis     Chronic diastolic heart failure     pt states it was due to a medication she was taking; work up by Dr. Peter     Closed left arm fracture     Coronary artery disease     Diverticulosis 09 14 13    ER scan of abdomen & pelvis  Diverticula    Fibromyalgia, primary     GERD (gastroesophageal reflux disease)     Gout     History of echocardiogram     Hyperlipidemia     Hypertension      Infectious viral hepatitis     Kidney disease     Knee pain     left knee pain  - uses cane -12/22/23    Low back pain     Mitral valve prolapse     Myocardial infarction     Renal insufficiency     RLS (restless legs syndrome)     Scoliosis     Shoulder fracture     left secondary to a fall - approx 2021    Sleep apnea     COMPLIANT WITH  CPAP    Tremor     About 2 years    Visual impairment 1960    Glasses    Wears glasses     Wears partial dentures      Past Surgical History:   Procedure Laterality Date    CARDIAC CATHETERIZATION      CHOLECYSTECTOMY      open procedure - 1970's    COLONOSCOPY      EYE SURGERY Bilateral     cateract    FRACTURE SURGERY      6th grade..left arm fractured..surg..[facility]    HERNIA REPAIR  2024    HYSTERECTOMY      vaginal hysterectomy    INGUINAL HERNIA REPAIR Left 12/26/2023    Procedure: INGUINAL HERNIA REPAIR LAPAROSCOPIC WITH [employer] ROBOT;  Surgeon: Krunal Chu MD;  Location: [facility];  Service: Robotics - [employer]!;  Laterality: Left;    JOINT REPLACEMENT      KNEE ARTHROPLASTY Right 10/17/2022    LYMPH NODE BIOPSY      TOTAL HIP ARTHROPLASTY Bilateral     TOTAL KNEE ARTHROPLASTY Left 02/2023    TUBAL ABDOMINAL LIGATION  2070    VARICOSE VEIN SURGERY        General Information       Row Name 02/13/25 1244          OT Time and Intention    Subjective Information no complaints  -SD     Document Type evaluation  -SD     Mode of Treatment occupational therapy  -SD     Patient Effort good  -SD     Symptoms Noted During/After Treatment fatigue  -SD       Row Name 02/13/25 1241          General Information    Patient Profile Reviewed yes  -SD     Prior Level of Function independent:;all household mobility;ADL's  patient reports she was ind with mobility and ADLs but things were becoming very difficult  -SD     Existing Precautions/Restrictions fall  -SD     Barriers to Rehab medically complex;previous functional deficit  -SD       Row Name 02/13/25 7257           Living Environment    People in Home child(marizol), adult  -SD       Row Name 02/13/25 1244          Home Main Entrance    Number of Stairs, Main Entrance four  -SD     Stair Railings, Main Entrance railings safe and in good condition  -SD       Row Name 02/13/25 1244          Stairs Within Home, Primary    Number of Stairs, Within Home, Primary none  -SD       [facility] Name 02/13/25 1244          Cognition    Orientation Status (Cognition) oriented x 4  -SD       [facility] Name 02/13/25 1244          Safety Issues/Impairments Affecting Functional Mobility    Safety Issues Affecting Function (Mobility) safety precautions follow-through/compliance;safety precaution awareness  -SD     Impairments Affecting Function (Mobility) balance;endurance/activity tolerance;shortness of breath;strength;range of motion (ROM)  -SD               User Key  (r) = Recorded By, (t) = Taken By, (c) = Cosigned By      Initials Name Provider Type    SD Josefina Dumont OT Occupational Therapist                     Mobility/ADL's       [facility] Name 02/13/25 1247          Bed Mobility    Bed Mobility supine-sit  -SD     Supine-Sit Cabins (Bed Mobility) contact guard  -SD     Assistive Device (Bed Mobility) bed rails;head of bed elevated  -SD       [facility] Name 02/13/25 1247          Transfers    Transfers sit-stand transfer  -SD       [facility] Name 02/13/25 1247          Sit-Stand Transfer    Sit-Stand Cabins (Transfers) contact guard  -SD     Assistive Device (Sit-Stand Transfers) walker, front-wheeled  -[facility] Name 02/13/25 1247          Functional Mobility    Functional Mobility- Ind. Level contact guard assist  -SD     Functional Mobility- Device walker, front-wheeled  -SD     Functional Mobility-Distance (Feet) 6  x2  -SD     Functional Mobility- Safety Issues balance decreased during turns;sequencing ability decreased;step length decreased;weight-shifting ability decreased  -SD       Row Name 02/13/25 1247          Activities of Daily Living     BADL Assessment/Intervention bathing;upper body dressing;lower body dressing;grooming;feeding;toileting  -SD       Row Name 02/13/25 1247          Bathing Assessment/Intervention    [address] Level (Bathing) upper body;minimum assist (75% patient effort);lower body;moderate assist (50% patient effort)  -SD       Row Name 02/13/25 1247          Upper Body Dressing Assessment/Training    [address] Level (Upper Body Dressing) minimum assist (75% patient effort)  -SD       Row Name 02/13/25 1247          Lower Body Dressing Assessment/Training    [address] Level (Lower Body Dressing) pants/bottoms;moderate assist (50% patient effort);socks;maximum assist (25% patient effort)  -SD       Row Name 02/13/25 1247          Grooming Assessment/Training    [address] Level (Grooming) minimum assist (75% patient effort)  -SD       Row Name 02/13/25 1247          Self-Feeding Assessment/Training    [address] Level (Feeding) supervision  -SD       Row Name 02/13/25 1247          Toileting Assessment/Training    [address] Level (Toileting) moderate assist (50% patient effort)  -SD               User Key  (r) = Recorded By, (t) = Taken By, (c) = Cosigned By      Initials Name Provider Type    Josefina Dumas OT Occupational Therapist                   Obj/Interventions       Row Name 02/13/25 1248          Range of Motion Comprehensive    General Range of Motion upper extremity range of motion deficits identified  -SD     Comment, General Range of Motion bilateral shoulder flexion deficits  -SD       Row Name 02/13/25 1248          Strength Comprehensive (MMT)    General Manual Muscle Testing (MMT) Assessment upper extremity strength deficits identified  -SD               User Key  (r) = Recorded By, (t) = Taken By, (c) = Cosigned By      Initials Name Provider Type    Josefina Dumas OT Occupational Therapist                   Goals/Plan       Row Name 02/13/25 1254          Transfer Goal 1 (OT)     Activity/Assistive Device (Transfer Goal 1, OT) sit-to-stand/stand-to-sit;walker, rolling  -SD     [place] Level/Cues Needed (Transfer Goal 1, OT) modified independence  -SD     Time Frame (Transfer Goal 1, OT) long term goal (LTG)  -SD     Progress/Outcome (Transfer Goal 1, OT) goal ongoing  -SD       Row Name 02/13/25 1254          Dressing Goal 1 (OT)    Activity/Device (Dressing Goal 1, OT) lower body dressing  -SD     [place]/Cues Needed (Dressing Goal 1, OT) minimum assist (75% or more patient effort)  -SD     Time Frame (Dressing Goal 1, OT) 2 weeks  -SD     Progress/Outcome (Dressing Goal 1, OT) goal ongoing  -SD       Row Name 02/13/25 1254          Toileting Goal 1 (OT)    Activity/Device (Toileting Goal 1, OT) toileting skills, all;commode  -SD     [place] Level/Cues Needed (Toileting Goal 1, OT) minimum assist (75% or more patient effort)  -SD     Time Frame (Toileting Goal 1, OT) 2 weeks  -SD     Progress/Outcome (Toileting Goal 1, OT) goal ongoing  -SD       Row Name 02/13/25 1254          Strength Goal 1 (OT)    Strength Goal 1 (OT) Patient to perform UB ther ex as tolerated  -SD     Time Frame (Strength Goal 1, OT) long term goal (LTG)  -SD     Progress/Outcome (Strength Goal 1, OT) goal ongoing  -SD       Row Name 02/13/25 1257          Therapy Assessment/Plan (OT)    Planned Therapy Interventions (OT) activity tolerance training;adaptive equipment training;BADL retraining;patient/caregiver education/training;ROM/therapeutic exercise;strengthening exercise;transfer/mobility retraining  -SD               User Key  (r) = Recorded By, (t) = Taken By, (c) = Cosigned By      Initials Name Provider Type    Josefina Dumas OT Occupational Therapist                   Clinical Impression       Row Name 02/13/25 1248          Pain Assessment    Pretreatment Pain Rating 0/10 - no pain  -SD     Posttreatment Pain Rating 0/10 - no pain  -SD       Row Name 02/13/25 1248          Plan of Care  Review    Plan of Care Reviewed With patient  -SD     Progress no change  -SD     Outcome Evaluation OT eval completed. Patient is supine in bed, son present at bedside. Patient is Ox4, denies pain at rest. Patient lives with her two sons in a one level home with 4 steps to enter. Patient reports she walks with a RW in the home, uses a cane for community. Patient normally sleeps on the couch, has a tub with shower chair, a BSC, rollator and lift recliner. Patient had left shoulder surgery 4 months ago and was scheduled to have her right shoulder replaced tomorrow. Patient reports increased difficulty at home with all self care. Patient performed supine to sit with CGA, sit to stand CGA and walked 6' x 2 using RW with CGA. Patient requires min-mod A overall for ADLs. Patient is expected to benefit from continued OT services prior to DC and would benefit from STR to address generalized weakness as well as decline in ADLs and mobility.  -SD       Row Name 02/13/25 1248          Therapy Assessment/Plan (OT)    Patient/Family Therapy Goal Statement (OT) rehab  -SD     Rehab Potential (OT) good  -SD     Criteria for Skilled Therapeutic Interventions Met (OT) skilled treatment is necessary  -SD     Therapy Frequency (OT) 3 times/wk  5 times if indicated  -SD       Row Name 02/13/25 1248          Therapy Plan Review/Discharge Plan (OT)    Anticipated Discharge Disposition (OT) inpatient rehabilitation facility;sub acute care setting  -SD       Row Name 02/13/25 1248          Vital Signs    O2 Delivery Pre Treatment room air  -SD     O2 Delivery Intra Treatment room air  -SD     O2 Delivery Post Treatment room air  -SD       Row Name 02/13/25 1248          Positioning and Restraints    Pre-Treatment Position in bed  -SD     Post Treatment Position chair  -SD     In Chair sitting;call light within reach;encouraged to call for assist;exit alarm on  -SD               User Key  (r) = Recorded By, (t) = Taken By, (c) = Cosigned  By      Initials Name Provider Type    Josefina Dumas OT Occupational Therapist                   Outcome Measures       Row Name 02/13/25 1254          How much help from another is currently needed...    Putting on and taking off regular lower body clothing? 2  -SD     Bathing (including washing, rinsing, and drying) 2  -SD     Toileting (which includes using toilet bed pan or urinal) 2  -SD     Putting on and taking off regular upper body clothing 3  -SD     Taking care of personal grooming (such as brushing teeth) 3  -SD     Eating meals 3  -SD     AM-PAC 6 Clicks Score (OT) 15  -SD       Row Name 02/13/25 0800 02/13/25 0140       How much help from another person do you currently need...    Turning from your back to your side while in flat bed without using bedrails? 2  -EB 2  -KT    Moving from lying on back to sitting on the side of a flat bed without bedrails? 2  -EB 2  -KT    Moving to and from a bed to a chair (including a wheelchair)? 2  -EB 2  -KT    Standing up from a chair using your arms (e.g., wheelchair, bedside chair)? 1  -EB 1  -KT    Climbing 3-5 steps with a railing? 1  -EB 1  -KT    To walk in hospital room? 1  -EB 1  -KT    AM-PAC 6 Clicks Score (PT) 9  -EB 9  -KT      Row Name 02/13/25 1254          Functional Assessment    Outcome Measure Options AM-PAC 6 Clicks Daily Activity (OT)  -SD               User Key  (r) = Recorded By, (t) = Taken By, (c) = Cosigned By      Initials Name Provider Type    Josefina Dumas OT Occupational Therapist    Jessica Espinoza, RN Registered Nurse    Renetta Rodriguez, RN Registered Nurse                    Occupational Therapy Education       Title: PT OT SLP Therapies (In Progress)       Topic: Occupational Therapy (In Progress)       Point: ADL training (Done)       Description:   Instruct learner(s) on proper safety adaptation and remediation techniques during self care or transfers.   Instruct in proper use of assistive devices.                   Learning Progress Summary            Patient Acceptance, E,TB, VU by SD at 2/13/2025 1854    Comment: OT POC                      Point: Home exercise program (Not Started)       Description:   Instruct learner(s) on appropriate technique for monitoring, assisting and/or progressing therapeutic exercises/activities.                  Learner Progress:  Not documented in this visit.              Point: Precautions (Not Started)       Description:   Instruct learner(s) on prescribed precautions during self-care and functional transfers.                  Learner Progress:  Not documented in this visit.              Point: Body mechanics (Not Started)       Description:   Instruct learner(s) on proper positioning and spine alignment during self-care, functional mobility activities and/or exercises.                  Learner Progress:  Not documented in this visit.                              User Key       Initials Effective Dates Name Provider Type Discipline    SD 06/16/21 -  Josefina Dumont OT Occupational Therapist OT                  OT Recommendation and Plan  Planned Therapy Interventions (OT): activity tolerance training, adaptive equipment training, BADL retraining, patient/caregiver education/training, ROM/therapeutic exercise, strengthening exercise, transfer/mobility retraining  Therapy Frequency (OT): 3 times/wk (5 times if indicated)  Plan of Care Review  Plan of Care Reviewed With: patient  Progress: no change  Outcome Evaluation: OT eval completed. Patient is supine in bed, son present at bedside. Patient is Ox4, denies pain at rest. Patient lives with her two sons in a one level home with 4 steps to enter. Patient reports she walks with a RW in the home, uses a cane for community. Patient normally sleeps on the couch, has a tub with shower chair, a BSC, rollator and lift recliner. Patient had left shoulder surgery 4 months ago and was scheduled to have her right shoulder replaced tomorrow. Patient  reports increased difficulty at home with all self care. Patient performed supine to sit with CGA, sit to stand CGA and walked 6' x 2 using RW with CGA. Patient requires min-mod A overall for ADLs. Patient is expected to benefit from continued OT services prior to DC and would benefit from STR to address generalized weakness as well as decline in ADLs and mobility.     Time Calculation:   Evaluation Complexity (OT)  Review Occupational Profile/Medical/Therapy History Complexity: brief/low complexity  Assessment, Occupational Performance/Identification of Deficit Complexity: 1-3 performance deficits  Clinical Decision Making Complexity (OT): problem focused assessment/low complexity  Overall Complexity of Evaluation (OT): low complexity     Time Calculation- OT       Row Name 02/13/25 1256             Time Calculation- OT    OT Start Time 1052  -SD      OT Received On 02/13/25  -SD      OT Goal Re-Cert Due Date 02/25/25  -SD         Untimed Charges    OT Eval/Re-eval Minutes 45  -SD         Total Minutes    Untimed Charges Total Minutes 45  -SD       Total Minutes 45  -SD                User Key  (r) = Recorded By, (t) = Taken By, (c) = Cosigned By      Initials Name Provider Type    SD Josefina Dumont OT Occupational Therapist                  Therapy Charges for Today       Code Description Service Date Service Provider Modifiers Qty    [id number] HC OT EVAL LOW COMPLEXITY 3 2/13/2025 Josefina Dumont OT GO 1                 Josefina Dumont OT  2/13/2025

## 2025-02-13 NOTE — PROGRESS NOTES
Exercise Oximetry    Patient Name:Seema Burkett   MRN: [id number]   Date: 02/13/25             ROOM AIR BASELINE   SpO2%  92   Heart Rate  62   Blood Pressure      EXERCISE ON ROOM AIR SpO2% EXERCISE ON O2 @ LPM SpO2%   1 MINUTE 94 1 MINUTE    2 MINUTES 96 2 MINUTES    3 MINUTES 96 3 MINUTES    4 MINUTES 95 4 MINUTES    5 MINUTES 95 5 MINUTES    6 MINUTES 96 6 MINUTES               Distance Walked   Distance Walked   Dyspnea (Mary Scale)   Dyspnea (Mary Scale)   Fatigue (Mary Scale)   Fatigue (Mary Scale)   SpO2% Post Exercise  95 SpO2% Post Exercise   HR Post Exercise  79 HR Post Exercise   Time to Recovery   Time to Recovery     Comments: Patient does not require oxygen.

## 2025-02-13 NOTE — PROGRESS NOTES
RT EQUIPMENT DEVICE RELATED - SKIN ASSESSMENT    Omega Score:  Omega Score: 15     RT Medical Equipment/Device:     NIV Mask:  Under-the-nose   size:  b    Skin Assessment:       :  Intact    Device Skin Pressure Protection:   none    Nurse Notification:  No    Melita Aleman, CRT

## 2025-02-13 NOTE — H&P
[facility]      Name:  Seema Burkett   Age:  84 y.o.  Sex:  female  :  1940  MRN:  [id number]   Visit Number:  [id number]  Admission Date:  2025  Date Of Service:  25  Primary Care Physician:  Lan Hollins DO    Chief Complaint:      short of breath, fevers, chills    History Of Presenting Illness:      Chronically ill 84-year-old with a history of asthma, CAD, hypertension, diastolic CHF, who presented from home due to shortness of breath, fever and chills.  She states symptoms started over the last 48 hours.  Multiple family members have been sick as well.  She noted her breathing had worsened earlier and thus come to emergency room via EMS.  She has a chills at home and a fever.  Poor appetite.  A lot of congestion.  Not coughing up much.    In the ER the patient was initially nonhypoxic, currently on 2 L saturating 94%.  Tmax 100.5.  CMP with creatinine 0.71 sodium 132.  Mild troponin elevation.  VBG unremarkable.  CRP 13 procalcitonin 0.1 proBNP 620 white count is 6 hemoglobin of 11.  CT imaging of the chest demonstrating bilateral lower lobe subsegmental atelectasis, posterior right upper lobe consolidation.  Large hiatal hernia.  Patient given Rocephin, bronchodilators, Tylenol, Tamiflu.  We were asked to admit.    Review Of Systems:    All systems were reviewed and negative except as mentioned in history of presenting illness, assessment and plan.    Past Medical History: Patient  has a past medical history of Angina pectoris, Arthritis, Asthma (09), Cancer, Cholelithiasis, Chronic diastolic heart failure, Closed left arm fracture, Coronary artery disease (), Diverticulosis (13), Fibromyalgia, primary, GERD (gastroesophageal reflux disease), Gout, History of echocardiogram, Hyperlipidemia, Hypertension, Infectious viral hepatitis, Kidney disease, Knee pain, Low back pain, Mitral valve prolapse, Myocardial  infarction, Renal insufficiency, RLS (restless legs syndrome), Scoliosis, Shoulder fracture, Sleep apnea, Tremor, Visual impairment (1960), Wears glasses, and Wears partial dentures.    Past Surgical History: Patient  has a past surgical history that includes Hysterectomy; Eye surgery (Bilateral); Total hip arthroplasty (Bilateral); Varicose vein surgery; Total knee arthroplasty (Left, 02/2023); Joint replacement; Knee Arthroplasty (Right, 10/17/2022); Cholecystectomy; Inguinal Hernia Repair (Left, 12/26/2023); Cardiac catheterization; Fracture surgery; Hernia repair (2024); Lymph node biopsy; Tubal ligation (2070); and Colonoscopy.    Social History: Patient  reports that she has never smoked. She has never been exposed to tobacco smoke. She has never used smokeless tobacco. She reports that she does not drink alcohol and does not use drugs.    Family History:  Patient's family history has been reviewed and found to be noncontributory.     Allergies:      Gabapentin (once-daily), Quinine, and Quinine derivatives    Home Medications:    Prior to Admission Medications       Prescriptions Last Dose Informant Patient Reported? Taking?    diclofenac (VOLTAREN) 50 MG EC tablet   No No    TAKE ONE TABLET BY MOUTH TWICE DAILY WITH FOOD AS NEEDED    DULoxetine (CYMBALTA) 30 MG capsule   No No    Take 1 capsule by mouth Daily.    furosemide (LASIX) 20 MG tablet   No No    Take 1 tablet by mouth Daily.    HYDROcodone-acetaminophen (NORCO) 7.5-325 MG per tablet   No No    Take 1 tablet by mouth Every 6 (Six) Hours As Needed for Moderate Pain (Pain).    MAGNESIUM-OXIDE PO   Yes No    Take  by mouth.    potassium chloride (KLOR-CON M10) 10 MEQ CR tablet   No No    Take 1 tablet by mouth Daily.    pramipexole (MIRAPEX) 0.5 MG tablet   Yes No    Take 1 tablet by mouth 3 (Three) Times a Day.    pregabalin (LYRICA) 50 MG capsule   No No    TAKE ONE CAPSULE BY MOUTH TWICE DAILY    traZODone (DESYREL) 50 MG tablet   No No    Take 1  "tablet by mouth Every Night.    vitamin C (ASCORBIC ACID) 250 MG tablet   Yes No    Take 1 tablet by mouth Daily.          ED Medications:    Medications   Pharmacy to Dose Oseltamivir (TAMIFLU) (has no administration in time range)   oseltamivir (TAMIFLU) capsule 75 mg (75 mg Oral Given 2/13/25 0007)   methylPREDNISolone sodium succinate (SOLU-Medrol) injection 125 mg (125 mg Intravenous Given 2/12/25 1948)   ipratropium-albuterol (DUO-NEB) nebulizer solution 3 mL (3 mL Nebulization Given 2/12/25 1928)   magnesium sulfate 2g/50 mL (PREMIX) infusion (0 g Intravenous Stopped 2/12/25 2016)   acetaminophen (TYLENOL) tablet 650 mg (650 mg Oral Given 2/12/25 2040)   iopamidol (ISOVUE-300) 61 % injection 100 mL (100 mL Intravenous Given 2/12/25 2245)   cefTRIAXone (ROCEPHIN) 2,000 mg in sodium chloride 0.9 % 100 mL IVPB-VTB (0 mg Intravenous Stopped 2/13/25 0045)   azithromycin (ZITHROMAX) tablet 500 mg (500 mg Oral Given 2/13/25 0007)     Vital Signs:  Temp:  [98.7 °F (37.1 °C)-100.5 °F (38.1 °C)] 98.7 °F (37.1 °C)  Heart Rate:  [71-86] 74  Resp:  [17-19] 17  BP: (111-158)/() 117/101        02/12/25 1923   Weight: 90.7 kg (200 lb)     Body mass index is 34.33 kg/m².    Physical Exam:     Most recent vital Signs: BP (!) 117/101   Pulse 74   Temp 98.7 °F (37.1 °C) (Oral)   Resp 17   Ht 162.6 cm (64\")   Wt 90.7 kg (200 lb)   SpO2 92%   BMI 34.33 kg/m²     Physical Exam  Constitutional:       Appearance: She is obese.   HENT:      Nose: Rhinorrhea present.      Mouth/Throat:      Mouth: Mucous membranes are dry.   Cardiovascular:      Rate and Rhythm: Normal rate and regular rhythm.      Pulses: Normal pulses.      Heart sounds: No murmur heard.     No gallop.   Pulmonary:      Effort: No respiratory distress.      Breath sounds: Wheezing and rhonchi present.   Abdominal:      General: Bowel sounds are normal. There is no distension.      Tenderness: There is no abdominal tenderness. There is no guarding. " "  Musculoskeletal:      Right lower leg: No edema.      Left lower leg: No edema.   Skin:     General: Skin is warm.      Capillary Refill: Capillary refill takes less than 2 seconds.   Neurological:      General: No focal deficit present.      Mental Status: She is alert. Mental status is at baseline.      Motor: Weakness present.         Laboratory data:    I have reviewed the labs done in the emergency room.    Results from last 7 days   Lab Units 02/12/25 1934   SODIUM mmol/L 132*   POTASSIUM mmol/L 3.8   CHLORIDE mmol/L 96*   CO2 mmol/L 23.4   BUN mg/dL 15   CREATININE mg/dL 0.71   CALCIUM mg/dL 8.4*   BILIRUBIN mg/dL 0.3   ALK PHOS U/L 82   ALT (SGPT) U/L 16   AST (SGOT) U/L 36*   GLUCOSE mg/dL 103*     Results from last 7 days   Lab Units 02/12/25 1934   WBC 10*3/mm3 6.95   HEMOGLOBIN g/dL 10.9*   HEMATOCRIT % 33.2*   PLATELETS 10*3/mm3 145         Results from last 7 days   Lab Units 02/12/25 2041 02/12/25 1934   CK TOTAL U/L  --  115   HSTROP T ng/L 24* 26*     Results from last 7 days   Lab Units 02/12/25 1934   PROBNP pg/mL 620.2                       Invalid input(s): \"USDES\", \"NITRITITE\", \"BACT\", \"EP\"    Pain Management Panel          1/25/2024   Pain Management Panel   Creatinine, Urine 10       Details                   EKG:      Sinus rhythm with a normal rate, see no acute ST changes    Radiology:    CT Angiogram Chest Pulmonary Embolism    Result Date: 2/12/2025  FINAL REPORT TECHNIQUE: null CLINICAL HISTORY: Short of breath, positive D-dimer, eval PE COMPARISON: null FINDINGS: CT angiogram chest/pulmonary arteries with contrast Multiplanar reconstructions and MIPS Comparison: None Findings: No filling defects are noted to suggest pulmonary embolus. Main pulmonary artery normal in caliber. Thoracic aorta normal caliber without dissection. Heart size enlarged. Great vessel origins patent. No coronary calcifications. Posterior right upper lobe nodular consolidation. Question pneumonia, please " correlate. Bilateral ground-glass appearance may be chronic. Bilateral lower lobe subsegmental atelectasis. No significant mediastinal or hilar adenopathy. No free pleural fluid. Scattered old bilateral rib fractures. No acute bony abnormality noted. Large hiatal hernia. Heterogeneous enlarged thyroid gland. Large left thyroid lobe nodules noted. Thyroid partially obscured by metal artifact. Consider nonemergent thyroid ultrasound.     Impression: No evidence of pulmonary embolus Posterior right upper lobe consolidation, possible pneumonia Bilateral lower lobe subsegmental atelectasis Authenticated and Electronically Signed by Jeremias Gibson MD on 02/12/2025 11:25:40 PM     Assessment:    Influenza A with viral pneumonia, POA  Respiratory insufficiency  Chronic diastolic CHF  CLIVE on CPAP  Weakness impaired mobility and ADLs  Obesity  CAD    Plan:    Influenza/viral pneumonia:  Order Tamiflu, bronchodilator, oxygen.  Continue with home CPAP.  Antibiotic given emergency room, normal procalcitonin and white count are more consistent with viral etiology but consider antibiotic continuation if worsening.    CHF/CLIVE/weakness/obesity/CAD:  Resume home medications as able.    Further recommendations depend upon clinical course    Risk Assessment: High  DVT Prophylaxis: Heparin  Code Status: Full  Diet: Regular    Advance Care Planning   ACP discussion was held with the patient during this visit. Patient has an advance directive in EMR which is still valid.            Patrizia Guzman DO  02/13/25  00:56 EST    Dictated utilizing Dragon dictation.

## 2025-02-13 NOTE — PLAN OF CARE
Problem: Adult Inpatient Plan of Care  Goal: Plan of Care Review  Outcome: Progressing  Goal: Patient-Specific Goal (Individualized)  Outcome: Progressing  Goal: Absence of Hospital-Acquired Illness or Injury  Outcome: Progressing  Intervention: Identify and Manage Fall Risk  Recent Flowsheet Documentation  Taken 2/13/2025 0200 by Evelyn Calderon RN  Safety Promotion/Fall Prevention:   activity supervised   assistive device/personal items within reach   clutter free environment maintained   safety round/check completed  Intervention: Prevent Skin Injury  Recent Flowsheet Documentation  Taken 2/13/2025 0200 by Evelyn Calderon RN  Body Position:   position changed independently   sitting up in bed  Skin Protection: incontinence pads utilized  Intervention: Prevent Infection  Recent Flowsheet Documentation  Taken 2/13/2025 0200 by Evelyn Calderon RN  Infection Prevention: environmental surveillance performed  Goal: Optimal Comfort and Wellbeing  Outcome: Progressing  Intervention: Provide Person-Centered Care  Recent Flowsheet Documentation  Taken 2/13/2025 0200 by Evelyn Calderon RN  Trust Relationship/Rapport:   care explained   choices provided   emotional support provided  Goal: Readiness for Transition of Care  Outcome: Progressing     Problem: Skin Injury Risk Increased  Goal: Skin Health and Integrity  Outcome: Progressing  Intervention: Optimize Skin Protection  Recent Flowsheet Documentation  Taken 2/13/2025 0200 by Evelyn Calderon RN  Activity Management: activity encouraged  Pressure Reduction Techniques:   frequent weight shift encouraged   heels elevated off bed  Head of Bed (HOB) Positioning: HOB elevated  Pressure Reduction Devices: pressure-redistributing mattress utilized  Skin Protection: incontinence pads utilized     Problem: Fall Injury Risk  Goal: Absence of Fall and Fall-Related Injury  Outcome: Progressing  Intervention: Identify and Manage Contributors  Recent Flowsheet  Documentation  Taken 2/13/2025 0200 by Evelyn Calderon RN  Medication Review/Management: medications reviewed  Intervention: Promote Injury-Free Environment  Recent Flowsheet Documentation  Taken 2/13/2025 0200 by Evelyn Calderon RN  Safety Promotion/Fall Prevention:   activity supervised   assistive device/personal items within reach   clutter free environment maintained   safety round/check completed     Problem: Gas Exchange Impaired  Goal: Optimal Gas Exchange  Outcome: Progressing  Intervention: Optimize Oxygenation and Ventilation  Recent Flowsheet Documentation  Taken 2/13/2025 0200 by Evelyn Calderon RN  Head of Bed (HOB) Positioning: HOB elevated     Problem: Noninvasive Ventilation Acute  Goal: Effective Unassisted Ventilation and Oxygenation  Outcome: Progressing     Problem: Pain Acute  Goal: Optimal Pain Control and Function  Outcome: Progressing  Intervention: Prevent or Manage Pain  Recent Flowsheet Documentation  Taken 2/13/2025 0200 by Evelyn Calderon RN  Medication Review/Management: medications reviewed   Goal Outcome Evaluation:      Plan of care reviewed with patient. Patient new admit this shift.

## 2025-02-13 NOTE — PROGRESS NOTES
[facility]    Name:  Seema Burkett   Age:  84 y.o.  Sex:  female  :  1940  MRN:  [id number]   Visit Number:  [id number]  Admission Date:  2025  Date Of Service:  25  Primary Care Physician:  Lan Hollins DO     LOS: 0 days :    Chief Complaint:      weakness    Subjective:    2025: Admitting provider documentation reviewed. Still having cough. No oxygen requirement. Breathing is better. On a rollater. But weakness is worse than baseline.    History Of Presenting Illness:       Chronically ill 84-year-old with a history of asthma, CAD, hypertension, diastolic CHF, who presented from home due to shortness of breath, fever and chills.  She states symptoms started over the last 48 hours.  Multiple family members have been sick as well.  She noted her breathing had worsened earlier and thus come to emergency room via EMS.  She has a chills at home and a fever.  Poor appetite.  A lot of congestion.  Not coughing up much.     In the ER the patient was initially nonhypoxic, currently on 2 L saturating 94%.  Tmax 100.5.  CMP with creatinine 0.71 sodium 132.  Mild troponin elevation.  VBG unremarkable.  CRP 13 procalcitonin 0.1 proBNP 620 white count is 6 hemoglobin of 11.  CT imaging of the chest demonstrating bilateral lower lobe subsegmental atelectasis, posterior right upper lobe consolidation.  Large hiatal hernia.  Patient given Rocephin, bronchodilators, Tylenol, Tamiflu.  We were asked to admit.  Edited by: Pacheco Ibarra DO at 2025 1148     Review of Systems:     All systems were reviewed and negative except as mentioned in subjective, assessment and plan.    Vital Signs:    Temp:  [97.5 °F (36.4 °C)-100.5 °F (38.1 °C)] 97.6 °F (36.4 °C)  Heart Rate:  [60-86] 60  Resp:  [15-23] 16  BP: (101-158)/() 108/87    Intake and output:    I/O last 3 completed shifts:  In: 150 [I.V.:50; IV Piggyback:100]  Out: -   I/O this  "shift:  In: 118 [P.O.:118]  Out: -     Physical Examination:    Constitutional: No acute distress, awake, alert  HENT: NCAT, mucous membranes moist  Respiratory: wheeze and rhonchi bilaterally, respiratory effort normal   Cardiovascular: RRR, no murmurs, rubs, or gallops  Gastrointestinal: Positive bowel sounds, soft, nontender, nondistended  Musculoskeletal: No bilateral ankle edema, diffuse weakness  Psychiatric: Appropriate affect, cooperative  Neurologic: Oriented x 3, speech clear  Skin: No rashes  Edited by: Pacheco Ibarra DO at 2/13/2025 0724     Laboratory results:    Results from last 7 days   Lab Units 02/13/25  0705 02/12/25  1934   SODIUM mmol/L 137 132*   POTASSIUM mmol/L 3.6 3.8   CHLORIDE mmol/L 101 96*   CO2 mmol/L 23.0 23.4   BUN mg/dL 14 15   CREATININE mg/dL 0.70 0.71   CALCIUM mg/dL 8.7 8.4*   BILIRUBIN mg/dL  --  0.3   ALK PHOS U/L  --  82   ALT (SGPT) U/L  --  16   AST (SGOT) U/L  --  36*   GLUCOSE mg/dL 218* 103*     Results from last 7 days   Lab Units 02/13/25  0554 02/12/25  1934   WBC 10*3/mm3 7.25 6.95   HEMOGLOBIN g/dL 11.3* 10.9*   HEMATOCRIT % 33.9* 33.2*   PLATELETS 10*3/mm3 85* 145         Results from last 7 days   Lab Units 02/12/25  2041 02/12/25  1934   CK TOTAL U/L  --  115   HSTROP T ng/L 24* 26*         No results for input(s): \"PHART\", \"DBW2CLM\", \"PO2ART\", \"VMC5CSJ\", \"BASEEXCESS\" in the last 8760 hours.   I have reviewed the patient's laboratory results.    Radiology results:    XR Chest 1 View    Result Date: 2/13/2025  CLINICAL INDICATION:  Shortness of breath; J96.01-Acute respiratory failure with hypoxia  EXAMINATION TECHNIQUE: XR CHEST 1 VW-  COMPARISON: Radiographs 5/30/2019.  FINDINGS: Heart is normal in size. Aortic atherosclerosis. Prominent pulmonary lyndsay. Bilateral patchy airspace consolidations. No pneumothorax. No large pleural effusion. Moderate-sized hiatal hernia. Left shoulder arthroplasty hardware with extensive glenoid process remodeling/subluxation. " Degenerative changes of the thoracic spine. Severe right glenohumeral joint osteoarthrosis.      Impression: Bilateral patchy consolidations, concerning for pneumonia.  Images personally reviewed, interpreted and dictated by TASIA Igelsias.     This report was signed and finalized on 2/13/2025 7:20 AM by TASIA Iglesias.      CT Angiogram Chest Pulmonary Embolism    Result Date: 2/12/2025  FINAL REPORT TECHNIQUE: null CLINICAL HISTORY: Short of breath, positive D-dimer, eval PE COMPARISON: null FINDINGS: CT angiogram chest/pulmonary arteries with contrast Multiplanar reconstructions and MIPS Comparison: None Findings: No filling defects are noted to suggest pulmonary embolus. Main pulmonary artery normal in caliber. Thoracic aorta normal caliber without dissection. Heart size enlarged. Great vessel origins patent. No coronary calcifications. Posterior right upper lobe nodular consolidation. Question pneumonia, please correlate. Bilateral ground-glass appearance may be chronic. Bilateral lower lobe subsegmental atelectasis. No significant mediastinal or hilar adenopathy. No free pleural fluid. Scattered old bilateral rib fractures. No acute bony abnormality noted. Large hiatal hernia. Heterogeneous enlarged thyroid gland. Large left thyroid lobe nodules noted. Thyroid partially obscured by metal artifact. Consider nonemergent thyroid ultrasound.     Impression: Impression: No evidence of pulmonary embolus Posterior right upper lobe consolidation, possible pneumonia Bilateral lower lobe subsegmental atelectasis Authenticated and Electronically Signed by Jeremias Gibson MD on 02/12/2025 11:25:40 PM   I have reviewed the patient's radiology reports.    Medication Review:     I have reviewed the patient's active and prn medications.     Problem List:      Influenza A      Assessment/Plan:    Influenza A with viral pneumonia, POA  Acute hypoxemic respiratory failure  Respiratory insufficiency  Chronic  diastolic CHF  CLIVE on CPAP  Weakness impaired mobility and ADLs  Obesity  CAD     Plan:     Influenza/viral pneumonia/respiratory failure:  Order Tamiflu, bronchodilator, pulmicort, if needed oxygen.  Continue with home CPAP prn.  Antibiotic given emergency room, normal procalcitonin and white count are more consistent with viral etiology but consider antibiotic continuation if worsening.      CHF/CLIVE/weakness/obesity/CAD:  Resume home medications as able.      DVT Prophylaxis: Heparin  Code Status: Full  Diet: Regular  Disposition: PT/OT to see [place] [facility] [place].  Notified by case management patient will not qualify for rehab due to being observation Medicare.  Edited by: Pacheco Ibarra DO at 2/13/2025 1148           Pacheco Ibarra DO  02/13/25  11:50 EST    Dictated utilizing Dragon dictation.

## 2025-02-13 NOTE — CASE MANAGEMENT/SOCIAL WORK
Discharge Planning Assessment   Jerald     Patient Name: Seema Burkett  MRN: [id number]  Today's Date: 2/13/2025    Admit Date: 2/12/2025    Plan: home with family   Discharge Needs Assessment       Row Name 02/13/25 1144       Living Environment    People in Home child(marizol), adult    Name(s) of People in Home 2 adult sons    Bakari    Current Living Arrangements home    Potentially Unsafe Housing Conditions none    In the past 12 months has the electric, gas, oil, or water company threatened to shut off services in your home? No    Primary Care Provided by self    Family Caregiver if Needed child(marizol), adult    Family Caregiver Names Bakari  son    Quality of Family Relationships involved    Able to Return to Prior Arrangements yes       Resource/Environmental Concerns    Resource/Environmental Concerns none    Transportation Concerns none       Transportation Needs    In the past 12 months, has lack of transportation kept you from medical appointments or from getting medications? no    In the past 12 months, has lack of transportation kept you from meetings, work, or from getting things needed for daily living? No       Food Insecurity    Within the past 12 months, you worried that your food would run out before you got the money to buy more. Never true    Within the past 12 months, the food you bought just didn't last and you didn't have money to get more. Never true       Transition Planning    Patient/Family Anticipates Transition to home with family    Patient/Family Anticipated Services at Transition durable medical equipment    Transportation Anticipated family or friend will provide       Discharge Needs Assessment    Readmission Within the Last 30 Days no previous admission in last 30 days    Equipment Currently Used at Home wheelchair;rollator;walker, rolling;cpap;commode    Concerns to be Addressed no discharge needs identified    Do you want help finding or keeping work or a job? I do not need  or want help    Do you want help with school or training? For example, starting or completing job training or getting a high school diploma, GED or equivalent No    Anticipated Changes Related to Illness none    Equipment Needed After Discharge oxygen    Outpatient/Agency/Support Group Needs homecare agency    Discharge Facility/Level of Care Needs home with home health    Provided Post Acute Provider List? Yes    Post Acute Provider List DME Supplier;Home Health    Delivered To Patient    Method of Delivery In person    Patient's Choice of Community Agency(s) no preference                   Discharge Plan       Row Name 02/13/25 1148       Plan    Plan home with family    Plan Comments mostly  independent of ADL's,  lives with two adult son  who helps her as needed.   Bakari [place]   [phone].    [place]  [facility]  is  her pharmacey.  Has advance direcitves on file.  Own POA.     Has a cpap  but does not use it.  Unkown provider.  No oxygen, no home health.    Plans on returning home at discharge  if home health  or oxygen  needed  no preference.   Son will transport                  Continued Care and Services - Admitted Since 2/12/2025    No active coordination exists for this encounter.          Demographic Summary       Row Name 02/13/25 1140       General Information    Admission Type observation    Arrived From emergency department    Required Notices Provided Observation Status Notice    Referral Source admission list    Reason for Consult discharge planning    Preferred Language English    General Information Comments Nic Villegas                   Functional Status       Row Name 02/13/25 1141       Functional Status    Usual Activity Tolerance moderate    Current Activity Tolerance poor       Physical Activity    On average, how many days per week do you engage in moderate to strenuous exercise (like a brisk walk)? 0 days    On average, how many minutes do you engage in exercise at this level? 0 min    Number  of minutes of exercise per week 0       Assessment of Health Literacy    How often do you have someone help you read hospital materials? Sometimes    How often do you have problems learning about your medical condition because of difficulty understanding written information? Sometimes    How often do you have a problem understanding what is told to you about your medical condition? Sometimes    How confident are you filling out medical forms by yourself? Quite a bit    Health Literacy Good       Functional Status, IADL    Medications independent    Meal Preparation independent    Housekeeping independent    Laundry assistive person    Shopping assistive person    If for any reason you need help with day-to-day activities such as bathing, preparing meals, shopping, managing finances, etc., do you get the help you need? I don't need any help       Mental Status    General Appearance WDL WDL       Mental Status Summary    Recent Changes in Mental Status/Cognitive Functioning no changes       Employment/    Employment Status retired                   Psychosocial       Row Name 02/13/25 1142       Behavior WDL    Behavior WDL WDL       Mental Health    Little interest or pleasure in doing things Not at all    Feeling down, depressed, or hopeless Not at all       Stress    Do you feel stress - tense, restless, nervous, or anxious, or unable to sleep at night because your mind is troubled all the time - these days? Not at all       Coping/Stress    Sources of Support adult child(marizol)                   Abuse/Neglect    No documentation.                  Legal       Row Name 02/13/25 1144       Financial Resource Strain    How hard is it for you to pay for the very basics like food, housing, medical care, and heating? Not hard       Financial/Legal    Source of Income social security    Financial/Environmental Concerns none    Application for Public Assistance not applied                   Substance Abuse    No  documentation.                  Patient Forms    No documentation.                     Maria Antonia Robles RN

## 2025-02-14 VITALS
OXYGEN SATURATION: 96 % | RESPIRATION RATE: 18 BRPM | HEIGHT: 64 IN | TEMPERATURE: 97.9 F | HEART RATE: 57 BPM | DIASTOLIC BLOOD PRESSURE: 81 MMHG | WEIGHT: 202.82 LBS | SYSTOLIC BLOOD PRESSURE: 134 MMHG | BODY MASS INDEX: 34.63 KG/M2

## 2025-02-14 LAB
ANION GAP SERPL CALCULATED.3IONS-SCNC: 10.1 MMOL/L (ref 5–15)
BUN SERPL-MCNC: 17 MG/DL (ref 8–23)
BUN/CREAT SERPL: 23.9 (ref 7–25)
CALCIUM SPEC-SCNC: 8.9 MG/DL (ref 8.6–10.5)
CHLORIDE SERPL-SCNC: 103 MMOL/L (ref 98–107)
CO2 SERPL-SCNC: 25.9 MMOL/L (ref 22–29)
CREAT SERPL-MCNC: 0.71 MG/DL (ref 0.57–1)
DEPRECATED RDW RBC AUTO: 45.5 FL (ref 37–54)
EGFRCR SERPLBLD CKD-EPI 2021: 85 ML/MIN/1.73
ERYTHROCYTE [DISTWIDTH] IN BLOOD BY AUTOMATED COUNT: 13.9 % (ref 12.3–15.4)
GLUCOSE SERPL-MCNC: 124 MG/DL (ref 65–99)
HCT VFR BLD AUTO: 31.4 % (ref 34–46.6)
HGB BLD-MCNC: 10.2 G/DL (ref 12–15.9)
MCH RBC QN AUTO: 29.1 PG (ref 26.6–33)
MCHC RBC AUTO-ENTMCNC: 32.5 G/DL (ref 31.5–35.7)
MCV RBC AUTO: 89.7 FL (ref 79–97)
PLATELET # BLD AUTO: 160 10*3/MM3 (ref 140–450)
PMV BLD AUTO: 13.6 FL (ref 6–12)
POTASSIUM SERPL-SCNC: 4.8 MMOL/L (ref 3.5–5.2)
RBC # BLD AUTO: 3.5 10*6/MM3 (ref 3.77–5.28)
SODIUM SERPL-SCNC: 139 MMOL/L (ref 136–145)
WBC NRBC COR # BLD AUTO: 8.49 10*3/MM3 (ref 3.4–10.8)

## 2025-02-14 PROCEDURE — G0378 HOSPITAL OBSERVATION PER HR: HCPCS

## 2025-02-14 PROCEDURE — 80048 BASIC METABOLIC PNL TOTAL CA: CPT | Performed by: INTERNAL MEDICINE

## 2025-02-14 PROCEDURE — 94761 N-INVAS EAR/PLS OXIMETRY MLT: CPT

## 2025-02-14 PROCEDURE — 25010000002 HEPARIN (PORCINE) PER 1000 UNITS: Performed by: FAMILY MEDICINE

## 2025-02-14 PROCEDURE — 94799 UNLISTED PULMONARY SVC/PX: CPT

## 2025-02-14 PROCEDURE — 99239 HOSP IP/OBS DSCHRG MGMT >30: CPT | Performed by: INTERNAL MEDICINE

## 2025-02-14 PROCEDURE — 94660 CPAP INITIATION&MGMT: CPT

## 2025-02-14 PROCEDURE — 96372 THER/PROPH/DIAG INJ SC/IM: CPT

## 2025-02-14 PROCEDURE — 85027 COMPLETE CBC AUTOMATED: CPT | Performed by: INTERNAL MEDICINE

## 2025-02-14 PROCEDURE — 94664 DEMO&/EVAL PT USE INHALER: CPT

## 2025-02-14 RX ORDER — PRAMIPEXOLE DIHYDROCHLORIDE 0.5 MG/1
0.5 TABLET ORAL
Start: 2025-02-14

## 2025-02-14 RX ORDER — OSELTAMIVIR PHOSPHATE 75 MG/1
75 CAPSULE ORAL EVERY 12 HOURS SCHEDULED
Qty: 7 CAPSULE | Refills: 0 | Status: SHIPPED | OUTPATIENT
Start: 2025-02-14 | End: 2025-02-18

## 2025-02-14 RX ORDER — ALBUTEROL SULFATE 90 UG/1
2 INHALANT RESPIRATORY (INHALATION) EVERY 4 HOURS PRN
Qty: 18 G | Refills: 2 | Status: SHIPPED | OUTPATIENT
Start: 2025-02-14

## 2025-02-14 RX ORDER — BUDESONIDE AND FORMOTEROL FUMARATE DIHYDRATE 160; 4.5 UG/1; UG/1
2 AEROSOL RESPIRATORY (INHALATION)
Qty: 10.2 G | Refills: 0 | Status: SHIPPED | OUTPATIENT
Start: 2025-02-14

## 2025-02-14 RX ORDER — LIDOCAINE 4 G/G
1 PATCH TOPICAL
Qty: 30 PATCH | Refills: 0 | Status: SHIPPED | OUTPATIENT
Start: 2025-02-15 | End: 2025-03-17

## 2025-02-14 RX ADMIN — HEPARIN SODIUM 5000 UNITS: 5000 INJECTION INTRAVENOUS; SUBCUTANEOUS at 09:47

## 2025-02-14 RX ADMIN — PRAMIPEXOLE DIHYDROCHLORIDE 0.25 MG: 0.25 TABLET ORAL at 09:47

## 2025-02-14 RX ADMIN — OSELTAMIVIR PHOSPHATE 75 MG: 75 CAPSULE ORAL at 09:47

## 2025-02-14 RX ADMIN — Medication 10 ML: at 09:48

## 2025-02-14 RX ADMIN — IPRATROPIUM BROMIDE AND ALBUTEROL SULFATE 3 ML: 2.5; .5 SOLUTION RESPIRATORY (INHALATION) at 07:30

## 2025-02-14 RX ADMIN — FUROSEMIDE 20 MG: 20 TABLET ORAL at 09:47

## 2025-02-14 RX ADMIN — POTASSIUM CHLORIDE 10 MEQ: 750 CAPSULE, EXTENDED RELEASE ORAL at 09:47

## 2025-02-14 RX ADMIN — LIDOCAINE 1 PATCH: 4 PATCH TOPICAL at 09:48

## 2025-02-14 RX ADMIN — BUDESONIDE INHALATION 0.5 MG: 0.5 SUSPENSION RESPIRATORY (INHALATION) at 07:30

## 2025-02-14 RX ADMIN — DULOXETINE HYDROCHLORIDE 30 MG: 30 CAPSULE, DELAYED RELEASE ORAL at 09:47

## 2025-02-14 RX ADMIN — PREGABALIN 50 MG: 50 CAPSULE ORAL at 09:47

## 2025-02-14 NOTE — PLAN OF CARE
Goal Outcome Evaluation:            Pt ambulated to toilet with walker x1 assist. Small loose BM. C/o pain to low back. Will continue plan of care.

## 2025-02-14 NOTE — DISCHARGE SUMMARY
[facility]      Name:  Seema Burkett   Age:  84 y.o.  Sex:  female  :  1940  MRN:  [id number]   Visit Number:  [id number]    Admission Date:  2025  Date of Discharge:  2025  Primary Care Physician:  Lan Hollins DO    Important issues to note:    Start: Albuterol, Symbicort, Lidoderm patch, Tamiflu  Stop: Nothing  Follow up: PCP and DSM  Brief Summary: Presented with dyspnea due to influenza. Initially had hypoxia which improved to baseline by the time of discharge.    Discharge Diagnoses:       Influenza A with viral pneumonia, POA  Acute hypoxemic respiratory failure  Respiratory insufficiency  Chronic diastolic CHF  CLIVE on CPAP  Weakness impaired mobility and ADLs  Obesity  CAD    Problem List:     Active Hospital Problems    Diagnosis  POA    **Influenza A [J10.1]  Yes      Resolved Hospital Problems   No resolved problems to display.     Presenting Problem:    Chief Complaint   Patient presents with    Shortness of Breath      Consults:     Consulting Physician(s)                     None                History Of Presenting Illness:       Chronically ill 84-year-old with a history of asthma, CAD, hypertension, diastolic CHF, who presented from home due to shortness of breath, fever and chills.  She states symptoms started over the last 48 hours.  Multiple family members have been sick as well.  She noted her breathing had worsened earlier and thus come to emergency room via EMS.  She has a chills at home and a fever.  Poor appetite.  A lot of congestion.  Not coughing up much.     In the ER the patient was initially nonhypoxic, currently on 2 L saturating 94%.  Tmax 100.5.  CMP with creatinine 0.71 sodium 132.  Mild troponin elevation.  VBG unremarkable.  CRP 13 procalcitonin 0.1 proBNP 620 white count is 6 hemoglobin of 11.  CT imaging of the chest demonstrating bilateral lower lobe subsegmental atelectasis, posterior right upper  lobe consolidation.  Large hiatal hernia.  Patient given Rocephin, bronchodilators, Tylenol, Tamiflu.  We were asked to admit.  Edited by: Pacheco Ibarra DO at 2/13/2025 1148      Hospital Course:       Influenza/viral pneumonia/respiratory failure:  Order Tamiflu, bronchodilator, pulmicort, if needed oxygen.  Continue with home CPAP prn.  Antibiotic given emergency room, normal procalcitonin and white count are more consistent with viral etiology but consider antibiotic continuation if worsening.  Patient was stable during inpatient without further need for antibiotics.  Had been hopeful for short-term rehab but did not qualify.  Was transitioned to home on room air with Tamiflu prescription     CHF/CLIVE/weakness/obesity/CAD:  Resume home medications as able.    Code Status: Full  Diet: Regular  Disposition: home with home health  Edited by: Pacheco Ibarra DO at 2/14/2025 0907      Vital Signs:    Temp:  [97.5 °F (36.4 °C)-97.9 °F (36.6 °C)] 97.9 °F (36.6 °C)  Heart Rate:  [57-93] 57  Resp:  [16-19] 18  BP: (108-134)/(68-87) 134/81    Physical Exam:    Constitutional: No acute distress, awake, alert  HENT: NCAT, mucous membranes moist  Respiratory: wheeze and rhonchi bilaterally, respiratory effort normal   Cardiovascular: RRR, no murmurs, rubs, or gallops  Gastrointestinal: Positive bowel sounds, soft, nontender, nondistended  Musculoskeletal: No bilateral ankle edema, diffuse weakness  Psychiatric: Appropriate affect, cooperative  Neurologic: Oriented x 3, speech clear  Skin: No rashes  Exam stable 2-    Pertinent Lab Results:     Results from last 7 days   Lab Units 02/14/25  0733 02/13/25  1907 02/13/25  0705 02/12/25  1934   SODIUM mmol/L 139  --  137 132*   POTASSIUM mmol/L 4.8 4.0 3.6 3.8   CHLORIDE mmol/L 103  --  101 96*   CO2 mmol/L 25.9  --  23.0 23.4   BUN mg/dL 17  --  14 15   CREATININE mg/dL 0.71  --  0.70 0.71   CALCIUM mg/dL 8.9  --  8.7 8.4*   BILIRUBIN mg/dL  --   --   --  0.3    ALK PHOS U/L  --   --   --  82   ALT (SGPT) U/L  --   --   --  16   AST (SGOT) U/L  --   --   --  36*   GLUCOSE mg/dL 124*  --  218* 103*     Results from last 7 days   Lab Units 02/14/25  0733 02/13/25  0554 02/12/25  1934   WBC 10*3/mm3 8.49 7.25 6.95   HEMOGLOBIN g/dL 10.2* 11.3* 10.9*   HEMATOCRIT % 31.4* 33.9* 33.2*   PLATELETS 10*3/mm3 160 85* 145         Results from last 7 days   Lab Units 02/12/25  2041 02/12/25 1934   CK TOTAL U/L  --  115   HSTROP T ng/L 24* 26*     Results from last 7 days   Lab Units 02/12/25 1934   PROBNP pg/mL 620.2                 Results from last 7 days   Lab Units 02/12/25  2358 02/12/25 2353   BLOODCX  No growth at 24 hours No growth at 24 hours       Pertinent Radiology Results:    Imaging Results (All)       Procedure Component Value Units Date/Time    XR Chest 1 View [[id number]] Collected: 02/13/25 0718     Updated: 02/13/25 0723    Narrative:      CLINICAL INDICATION:    Shortness of breath; J96.01-Acute respiratory failure with hypoxia     EXAMINATION TECHNIQUE:  XR CHEST 1 VW-     COMPARISON:  Radiographs 5/30/2019.     FINDINGS:  Heart is normal in size. Aortic atherosclerosis. Prominent pulmonary  lyndsay. Bilateral patchy airspace consolidations. No pneumothorax. No  large pleural effusion. Moderate-sized hiatal hernia. Left shoulder  arthroplasty hardware with extensive glenoid process  remodeling/subluxation. Degenerative changes of the thoracic spine.  Severe right glenohumeral joint osteoarthrosis.       Impression:      Bilateral patchy consolidations, concerning for pneumonia.     Images personally reviewed, interpreted and dictated by TASIA Iglesias.              This report was signed and finalized on 2/13/2025 7:20 AM by TASIA Iglesias.       CT Angiogram Chest Pulmonary Embolism [[id number]] Collected: 02/12/25 2325     Updated: 02/12/25 2326    Narrative:      FINAL REPORT    TECHNIQUE:  null    CLINICAL HISTORY:  Short of breath, positive  D-dimer, eval PE    COMPARISON:  null    FINDINGS:  CT angiogram chest/pulmonary arteries with contrast    Multiplanar reconstructions and MIPS    Comparison: None    Findings:    No filling defects are noted to suggest pulmonary embolus.    Main pulmonary artery normal in caliber.    Thoracic aorta normal caliber without dissection.    Heart size enlarged. Great vessel origins patent.    No coronary calcifications.    Posterior right upper lobe nodular consolidation.    Question pneumonia, please correlate.    Bilateral ground-glass appearance may be chronic.    Bilateral lower lobe subsegmental atelectasis.    No significant mediastinal or hilar adenopathy.    No free pleural fluid.    Scattered old bilateral rib fractures.    No acute bony abnormality noted.    Large hiatal hernia.    Heterogeneous enlarged thyroid gland.    Large left thyroid lobe nodules noted.    Thyroid partially obscured by metal artifact.    Consider nonemergent thyroid ultrasound.      Impression:      Impression:    No evidence of pulmonary embolus    Posterior right upper lobe consolidation, possible pneumonia    Bilateral lower lobe subsegmental atelectasis    Authenticated and Electronically Signed by Jeremias Gibson MD on  02/12/2025 11:25:40 PM            Echo:    Results for orders placed during the hospital encounter of 12/30/20    Adult Transthoracic Echo Complete W/ Cont if Necessary Per Protocol    Interpretation Summary  · Estimated left ventricular EF = 66% Left ventricular ejection fraction appears to be 66 - 70%. Left ventricular systolic function is normal.  · Left atrial volume is mildly increased.  · Left ventricular diastolic function is consistent with age.  · Estimated right ventricular systolic pressure from tricuspid regurgitation is normal (<35 mmHg). Calculated right ventricular systolic pressure from tricuspid regurgitation is 25 mmHg.    Condition on Discharge:      Stable.    Code status during the hospital  stay:    Code Status and Medical Interventions: CPR (Attempt to Resuscitate); Full Support   Ordered at: 02/13/25 0132     Code Status (Patient has no pulse and is not breathing):    CPR (Attempt to Resuscitate)     Medical Interventions (Patient has pulse or is breathing):    Full Support     Discharge Dis[place] [facility] [place] Medications:       Discharge Medications        New Medications        Instructions Start Date   albuterol sulfate  (90 Base) MCG/ACT inhaler  Commonly known as: PROVENTIL HFA;VENTOLIN HFA;PROAIR HFA   2 puffs, Inhalation, Every 4 Hours PRN      budesonide-formoterol 160-4.5 MCG/ACT inhaler  Commonly known as: Symbicort   2 puffs, Inhalation, 2 Times Daily - RT      oseltamivir 75 MG capsule  Commonly known as: TAMIFLU   75 mg, Oral, Every 12 Hours Scheduled             Continue These Medications        Instructions Start Date   diclofenac 50 MG EC tablet  Commonly known as: VOLTAREN   TAKE ONE TABLET BY MOUTH TWICE DAILY WITH FOOD AS NEEDED      DULoxetine 30 MG capsule  Commonly known as: CYMBALTA   30 mg, Oral, Daily      furosemide 20 MG tablet  Commonly known as: LASIX   20 mg, Oral, Daily      HYDROcodone-acetaminophen 7.5-325 MG per tablet  Commonly known as: NORCO   1 tablet, Oral, Every 6 Hours PRN      MAGNESIUM-OXIDE PO   Take  by mouth.      potassium chloride 10 MEQ CR tablet  Commonly known as: KLOR-CON M10   10 mEq, Oral, Daily      pramipexole 0.5 MG tablet  Commonly known as: MIRAPEX   0.5 mg, Oral, Every Night at Bedtime      pregabalin 50 MG capsule  Commonly known as: LYRICA   50 mg, Oral, 2 Times Daily      traZODone 50 MG tablet  Commonly known as: DESYREL   50 mg, Oral, Nightly      vitamin C 250 MG tablet  Commonly known as: ASCORBIC ACID   250 mg, Oral, Daily             Discharge Diet:     Diet Instructions       Advance Diet As Tolerated -Target Diet: heart healthy diet      Target Diet: heart healthy diet          Activity at  Discharge:       Follow-up Appointments:    Additional Instructions for the Follow-ups that You Need to Schedule       Ambulatory Referral to Disease State Management   As directed      To dept: [facility] [[id number]]   What program(s) are you referring for?: Heart Failure Medication Management   Follow-up needed: Yes        Ambulatory Referral to Home Health (Hospital)   As directed      Face to Face Visit Date: 2/13/2025   Follow-up provider for Plan of Care?: I treated the patient in an acute care facility and will not continue treatment after discharge.   Follow-up provider: VENTURA QUEEN [[id number]]   Reason/Clinical Findings: weakness deconditioning flu A, diastolic HF, CLIVE   Describe mobility limitations that make leaving home difficult: she doesn't go out   Nursing/Therapeutic Services Requested: Skilled Nursing Physical Therapy Occupational Therapy   Skilled nursing orders: Medication education Cardiopulmonary assessments CHF management   PT orders: Home safety assessment Strengthening   Occupational orders: Strengthening Home safety assessment   Frequency: 1 Week 1        Discharge Follow-up with PCP   As directed       Currently Documented PCP:    Ventura Queen DO    PCP Phone Number:    [phone]     Follow Up Details: 1 week               Contact information for follow-up providers       Ventura Queen DO .    Specialty: Family Medicine  Why: 1 week  Contact information:  [address] [place] [address] [phone]                       Contact information for after-discharge care       Home Medical Care       [facility] .    Service: Home Health Services  Contact information:  [address] [address]  [phone]                                 Future Appointments   Date Time Provider Department Center   8/4/2025  1:30 PM Jared Lane APRN [facility]     Test Results Pending at Discharge:    Pending Labs       Order  Current Status    Blood Culture - Blood, Arm, Left Preliminary result    Blood Culture - Blood, Arm, Right Preliminary result               Pacheco K DO Stacey  02/14/25  09:40 EST    Time: I spent 45 minutes on this discharge activity which included: face-to-face encounter with the patient, reviewing the data in the system, coordination of the care with the nursing staff as well as consultants, documentation, and entering orders.     Dictated utilizing Dragon dictation.

## 2025-02-14 NOTE — CASE MANAGEMENT/SOCIAL WORK
Case Management/Social Work    Patient Name:  Seema Burkett  YOB: 1940  MRN: [id number]  Admit Date:  2/12/2025        09:01 EST   Discharge Plan       Row Name 02/14/25 0859       Plan    Plan Comments per [facility]ul/ [facility] unable to accept due to staffing  09:05 EST  Called [facility], accepted  11:03 EST  Spoke with daughter, stated brother coming to transport pt home; informed [facility] will be calling and she stated already called, very appreciatiave; no other cm needs; informed [facility] pt leaving                        Electronically signed by:  Zenaida Florentino RN  02/14/25 09:01 EST

## 2025-02-14 NOTE — PLAN OF CARE
Problem: Noninvasive Ventilation Acute  Goal: Effective Unassisted Ventilation and Oxygenation  Outcome: Progressing  Intervention: Monitor and Manage Noninvasive Ventilation  Flowsheets (Taken 2/13/2025 2236)  Airway/Ventilation Management:   airway patency maintained   positive pressure ventilation provided   oxygen therapy provided  NPPV/CPAP Maintenance: proper fit/secure   Goal Outcome Evaluation:

## 2025-02-14 NOTE — PROGRESS NOTES
RT EQUIPMENT DEVICE RELATED - SKIN ASSESSMENT    Omega Score:  Omega Score: 15     RT Medical Equipment/Device:     NIV Mask:  Under-the-nose   size:  b    Skin Assessment:      Cheek:  Intact  Nose:  Intact    Device Skin Pressure Protection:  Pressure points protected    Nurse Notification:  Tiffany Kaiser, CRT

## 2025-02-14 NOTE — CASE MANAGEMENT/SOCIAL WORK
Case Management Discharge Note           Provided Post Acute Provider List?: Yes  Post Acute Provider List: DME Supplier, Home Health  Delivered To: Patient  Method of Delivery: In person    Selected Continued Care - Discharged on 2/14/2025 Admission date: 2/12/2025 - [place] [facility] [place] Medical Care Coordination complete.      Service Provider Services Address Phone Fax Patient Preferred    [facility] - [facility] [address] [place], [place] [id number] [phone] --                 Transportation Services  Private: Car    Final Discharge Disposition Code: 06 - home with home health care

## 2025-02-18 LAB
BACTERIA SPEC AEROBE CULT: NORMAL
BACTERIA SPEC AEROBE CULT: NORMAL

## 2025-02-22 DIAGNOSIS — M17.12 PRIMARY OSTEOARTHRITIS OF LEFT KNEE: ICD-10-CM

## 2025-02-22 DIAGNOSIS — M25.512 ARTHRALGIA OF LEFT SHOULDER REGION: ICD-10-CM

## 2025-02-22 DIAGNOSIS — M25.562 ARTHRALGIA OF LEFT KNEE: ICD-10-CM

## 2025-02-24 NOTE — TELEPHONE ENCOUNTER
Rx Refill Note  Requested Prescriptions     Pending Prescriptions Disp Refills    diclofenac (VOLTAREN) 50 MG EC tablet [Pharmacy Med Name: diclofenac sodium 50 mg tablet,delayed release] 60 tablet 2     Sig: TAKE ONE TABLET BY MOUTH TWICE DAILY WITH FOOD AS NEEDED      Last office visit with prescribing clinician: 9/30/2024   Last telemedicine visit with prescribing clinician: Visit date not found   Next office visit with prescribing clinician: 3/4/2025                         Would you like a call back once the refill request has been completed: [] Yes [] No    If the office needs to give you a call back, can they leave a voicemail: [] Yes [] No    Ara Cortez MA  02/24/25, 09:01 EST

## 2025-02-25 DIAGNOSIS — G25.81 RESTLESS LEGS SYNDROME: ICD-10-CM

## 2025-02-25 DIAGNOSIS — F51.01 PRIMARY INSOMNIA: ICD-10-CM

## 2025-02-25 RX ORDER — PRAMIPEXOLE DIHYDROCHLORIDE 0.5 MG/1
0.5 TABLET ORAL
Qty: 90 TABLET | Refills: 3 | OUTPATIENT
Start: 2025-02-25

## 2025-02-25 RX ORDER — TRAZODONE HYDROCHLORIDE 50 MG/1
50 TABLET ORAL
Qty: 90 TABLET | Refills: 3 | Status: SHIPPED | OUTPATIENT
Start: 2025-02-25

## 2025-02-25 NOTE — TELEPHONE ENCOUNTER
Rx Refill Note  Requested Prescriptions     Pending Prescriptions Disp Refills    pramipexole (MIRAPEX) 0.5 MG tablet [Pharmacy Med Name: pramipexole 0.5 mg tablet] 90 tablet 3     Sig: TAKE ONE TABLET BY MOUTH AT BEDTIME    traZODone (DESYREL) 50 MG tablet [Pharmacy Med Name: trazodone 50 mg tablet] 90 tablet 3     Sig: TAKE ONE TABLET BY MOUTH EVERY NIGHT      Last office visit with prescribing clinician: 9/30/2024   Last telemedicine visit with prescribing clinician: Visit date not found   Next office visit with prescribing clinician: 3/4/2025                         Would you like a call back once the refill request has been completed: [] Yes [] No    If the office needs to give you a call back, can they leave a voicemail: [] Yes [] No    Ara Cortez MA  02/25/25, 10:35 EST

## 2025-02-26 ENCOUNTER — TELEPHONE (OUTPATIENT)
Age: 85
End: 2025-02-26
Payer: MEDICARE

## 2025-02-26 NOTE — TELEPHONE ENCOUNTER
Caller: ANNA GIBSON    Relationship: Brother/Sister    Best call back number: [phone]     What is the best time to reach you: ANYTIME    Who are you requesting to speak with (clinical staff, provider,  specific staff member): CLINICAL      What was the call regarding: PATIENT'S SISTER STATES THE PATIENT IS HAVING SURGERY ON 02/28/25. BECAUSE OF A RECENT HOSPITAL STAY. DR. ARTEAGA NEEDS FOR DR. QUEEN TO REVIEW THE HOSPITAL NOTES TO MAKE SURE THE PATIENT IS STILL OK TO HAVE THE SHOULDER REPLACEMENT SURGERY. PLEASE CALL ELVI MCKINNON, [phone], AT DR. ARTEAGA'S OFFICE TO ADVISE.

## 2025-03-04 ENCOUNTER — OFFICE VISIT (OUTPATIENT)
Age: 85
End: 2025-03-04
Payer: MEDICARE

## 2025-03-04 VITALS
OXYGEN SATURATION: 96 % | HEIGHT: 64 IN | HEART RATE: 76 BPM | WEIGHT: 201 LBS | BODY MASS INDEX: 34.31 KG/M2 | DIASTOLIC BLOOD PRESSURE: 80 MMHG | SYSTOLIC BLOOD PRESSURE: 110 MMHG

## 2025-03-04 DIAGNOSIS — I10 ESSENTIAL HYPERTENSION: ICD-10-CM

## 2025-03-04 DIAGNOSIS — J22 ACUTE LOWER RESPIRATORY TRACT INFECTION: ICD-10-CM

## 2025-03-04 DIAGNOSIS — S46.812S TRAUMATIC RUPTURE OF SUPRASPINATUS TENDON OF LEFT SHOULDER, SEQUELA: ICD-10-CM

## 2025-03-04 DIAGNOSIS — M19.012 PRIMARY OSTEOARTHRITIS OF LEFT SHOULDER: ICD-10-CM

## 2025-03-04 DIAGNOSIS — I50.32 CHRONIC DIASTOLIC (CONGESTIVE) HEART FAILURE: Primary | ICD-10-CM

## 2025-03-04 DIAGNOSIS — M75.02 ADHESIVE CAPSULITIS OF LEFT SHOULDER: ICD-10-CM

## 2025-03-04 RX ORDER — AZITHROMYCIN 250 MG/1
TABLET, FILM COATED ORAL
Qty: 6 TABLET | Refills: 0 | Status: SHIPPED | OUTPATIENT
Start: 2025-03-04

## 2025-03-04 NOTE — PROGRESS NOTES
Established Patient        Chief Complaint:   Chief Complaint   Patient presents with    Hospital Follow Up Visit     Pneumonia + flu        Seema Burkett is a 84 y.o. female    History of Present Illness:   Here today in follow-up of recent hospitalization as a result/consequence of influenza.  She developed secondary pneumonia, required hospitalization for 48 hours.  She reports her respiratory symptoms have progressively improved, back to baseline at present.  Denies fever, chills or night sweats.    Patient does have underlying chronic diastolic CHF, as well as hypertension.  She denies chest pain, syncope, palpitations or vertigo.  No orthopnea.  She has planned left total shoulder replacement planned in approximately 10 days.  She denies aspiration/dysphagia.  Maintains good urine output.  Denies hematuria.  Denies any BRB/BTS.    Subjective     The following portions of the patient's history were reviewed and updated as appropriate: allergies, current medications, past family history, past medical history, past social history, past surgical history and problem list.    Allergies   Allergen Reactions    Gabapentin (Once-Daily) Hallucinations    Quinine Unknown - Low Severity    Quinine Derivatives Other (See Comments)     FLU LIKE SYMPTOMS       Review of Systems  Constitutional: Negative for fever. Negative for chills, diaphoresis, fatigue and unexpected weight change.   HENT: No dysphagia; no changes to vision/hearing/smell/taste; no epistaxis  Eyes: Negative for redness and visual disturbance.   Respiratory: negative for shortness of breath. Negative for chest pain . Negative for cough and chest tightness.   Cardiovascular: Negative for chest pain and palpitations.   Gastrointestinal: Negative for abdominal distention, abdominal pain and blood in stool.   Endocrine: Negative for cold intolerance and heat intolerance.   Genitourinary: Negative for difficulty urinating, dysuria  "and frequency.   Musculoskeletal: Chronic arthralgias of bilateral knees.  Impaired ROM to left shoulder.  Right arm dominant.  Skin: Postsurgical changes of expected nature.  Neurological: Negative for syncope, weakness and headaches.   Hematological: Negative for adenopathy. Does not bruise/bleed easily.   Psychiatric/Behavioral: Negative for confusion. The patient is not nervous/anxious.    Objective     Physical Exam   Vital Signs: /80   Pulse 76   Ht 162.6 cm (64\")   Wt 91.2 kg (201 lb)   SpO2 96%   BMI 34.50 kg/m²       Patient's (Body mass index is 34.5 kg/m².) indicates that they are obese (BMI >30) with health related conditions that include hypertension, diabetes mellitus, and osteoarthritis . Weight is improving with lifestyle modifications. BMI is is above average; BMI management plan is completed. We discussed portion control and increasing exercise.      General Appearance: alert, oriented x 3, no acute distress.  Skin: warm and dry.   HEENT: Atraumatic.  pupils round and reactive to light and accommodation, oral mucosa pink and moist.  Nares patent without epistaxis.  External auditory canals are patent tympanic membranes intact.  Neck: supple, no JVD, trachea midline.  No thyromegaly  Lungs: CTA, unlabored breathing effort.  Heart: RRR, normal S1 and S2, no S3, no rub.  Abdomen: soft, non-tender, no palpable bladder, present bowel sounds to auscultation ×4.  No guarding or rigidity.  Extremities: no clubbing, cyanosis.  Expected gravity dependent edema bilateral lower extremities.  Postsurgical scarring noted to the anterior bilateral knees.  There is mild tenderness on medial/lateral joint line tenderness of the knees.  Yasmine/Mackenzie sign negative.  Normal dorsiflexion/plantarflexion of the feet.  Severe impairment to ROM A/P of L shoulder; unable to perform Apley's scratch test; pos Goodhue's.   symmetric.  Normal flex/ext at elbows, normal supination/pronation.  Neuro: normal " speech and mental status.  Cranial nerves II through XII intact.  No anosmia. DTR 2+; proprioception intact.  No focal motor/sensory deficits.        Assessment and Plan      Assessment/Plan:   Diagnoses and all orders for this visit:    1. Chronic diastolic (congestive) heart failure (Primary)    2. Essential hypertension    3. Acute lower respiratory tract infection  -     azithromycin (Zithromax Z-Dennis) 250 MG tablet; Take 2 tablets by mouth on day 1, then 1 tablet daily on days 2-5  Dispense: 6 tablet; Refill: 0    I have provided patient with a prescription for azithromycin, preemptively treating any potential residual reactive airway symptoms and secondary infection in an attempt to minimize potential for surgical delay.    Vital signs demonstrate hemodynamic stability, blood pressure is at goal.  Demonstrates no increased work of breathing or other worrisome signs of respiratory compromise.    Pain appears clinically well-controlled.  Keep scheduled follow-up appointment for preoperative testing, she is currently low risk for any-/postoperative surgical complications.  Appears medically maximized.    Continue low-sodium/salt dietary intake.      Discussion Summary:    Discussed plan of care in detail with pt today; pt verb understanding and agrees.    I spent 35 minutes caring for Georgia on this date of service. This time includes time spent by me in the following activities:preparing for the visit, reviewing tests, obtaining and/or reviewing a separately obtained history, performing a medically appropriate examination and/or evaluation , counseling and educating the patient/family/caregiver, ordering medications, tests, or procedures, documenting information in the medical record, and care coordination    I confirm accuracy of unchanged data/findings which have been carried forward from previous visit, as well as I have updated appropriately those that have changed.        Follow up:  No follow-ups on file.      There are no Patient Instructions on file for this visit.    Lan Hollins,   03/04/25  11:43 EST

## 2025-03-10 ENCOUNTER — OUTSIDE FACILITY SERVICE (OUTPATIENT)
Age: 85
End: 2025-03-10
Payer: MEDICARE

## 2025-03-14 DIAGNOSIS — G25.81 RESTLESS LEGS SYNDROME: ICD-10-CM

## 2025-03-17 ENCOUNTER — READMISSION MANAGEMENT (OUTPATIENT)
Dept: CALL CENTER | Facility: HOSPITAL | Age: 85
End: 2025-03-17
Payer: MEDICARE

## 2025-03-17 RX ORDER — PRAMIPEXOLE DIHYDROCHLORIDE 0.5 MG/1
0.5 TABLET ORAL
Qty: 90 TABLET | Refills: 3 | Status: SHIPPED | OUTPATIENT
Start: 2025-03-17

## 2025-03-17 NOTE — TELEPHONE ENCOUNTER
Rx Refill Note  Requested Prescriptions     Pending Prescriptions Disp Refills    pramipexole (MIRAPEX) 0.5 MG tablet [Pharmacy Med Name: pramipexole 0.5 mg tablet] 90 tablet 3     Sig: TAKE ONE TABLET BY MOUTH AT BEDTIME      Last office visit with prescribing clinician: 3/4/2025   Last telemedicine visit with prescribing clinician: Visit date not found   Next office visit with prescribing clinician: 6/9/2025                         Would you like a call back once the refill request has been completed: [] Yes [] No    If the office needs to give you a call back, can they leave a voicemail: [] Yes [] No    Ara Cortez MA  03/17/25, 07:54 EDT

## 2025-03-18 ENCOUNTER — TRANSITIONAL CARE MANAGEMENT TELEPHONE ENCOUNTER (OUTPATIENT)
Dept: CALL CENTER | Facility: HOSPITAL | Age: 85
End: 2025-03-18
Payer: MEDICARE

## 2025-03-18 NOTE — OUTREACH NOTE
Call Center TCM Note      Flowsheet Row Responses   [facility] patient discharged from? Non-BH   Does the patient have one of the following disease processes/diagnoses(primary or secondary)? Total Joint Replacement   TCM attempt successful? No   Unsuccessful attempts Attempt 1            Berenice Butt RN    3/18/2025, 13:27 EDT

## 2025-03-18 NOTE — OUTREACH NOTE
Call Center TCM Note      Flowsheet Row Responses   [facility] patient discharged from? Non-BH   Does the patient have one of the following disease processes/diagnoses(primary or secondary)? Total Joint Replacement   TCM attempt successful? Yes   Call start time 1618   Call Status Left message   Call end time 1629   Discharge diagnosis [facility] ARTHROPLASTY GLENOHUMERAL JOINT TOTAL SHOULDER   ARTHROPLASTY, SHOULDER, TOTAL, REVERSE   Person spoke with today (if not patient) and relationship pt   Meds reviewed with patient/caregiver? Yes   Is the patient having any side effects they believe may be caused by any medication additions or changes? No   Does the patient have all medications ordered at discharge? Yes   Is the patient taking all medications as directed (includes completed medication regime)? Yes   Comments Post-op appt 3/26/25   Does the patient have an appointment with their PCP within 7-14 days of discharge? No   Nursing Interventions Patient desires to follow up with specialty only   Psychosocial issues? No   Did the patient receive a copy of their discharge instructions? Yes   Nursing interventions Reviewed instructions with patient   What is the patient's perception of their health status since discharge? Improving   Is the patient/caregiver able to teach back signs and symptoms related to disease process for when to call PCP? Yes   Is the patient/caregiver able to teach back signs and symptoms related to disease process for when to call 911? Yes   Is the patient/caregiver able to teach back the hierarchy of who to call/visit for symptoms/problems? PCP, Specialist, Home health nurse, Urgent Care, ED, 911 Yes   If the patient is a current smoker, are they able to teach back resources for cessation? Not a smoker   TCM call completed? Yes   Wrap up additional comments Pt was sent home from [facility] as pt/sister states medicare did not qualify her for inpt PT. Pt was sent home and pt/sister states  she has no mobility in left arm, and limited mobility in right arm r/t shoulder surgery. Pt is not able to do ADLs on her own. Sister brought pt home with her and sister states she is not able to do many things for patient has she is limited too. Sister shares there was no way pt could take care of herself at home and was discharge. Sent referral to ACM.   Call end time 1629   Would this patient benefit from a Referral to [facility] Social Work? No   Is the patient interested in additional calls from an ambulatory ? Yes   What is the reason the patient needs support from an ACM? Preventative Care, Barriers to Care, Advanced Care Planning  [Pt had surgery on right shoulder and has limited mobility, and does not have mobility of left arm. Pt was sent home and could not do ADLs. Pt is needing inpt PT and is now staying with sister that is limited on how she can help pt. the pt.]            Berenice Butt RN    3/18/2025, 16:38 EDT

## 2025-03-18 NOTE — OUTREACH NOTE
Prep Survey      Flowsheet Row Responses   [facility] facility patient discharged from? Non-BH   Is LACE score < 7 ? Non-BH Discharge   Eligibility [facility]   Date of Admission 03/14/25   Date of Discharge 03/17/25   Discharge Disposition Home or Self Care   Discharge diagnosis [facility] ARTHROPLASTY GLENOHUMERAL JOINT TOTAL SHOULDER   ARTHROPLASTY, SHOULDER, TOTAL, REVERSE   Does the patient have one of the following disease processes/diagnoses(primary or secondary)? Total Joint Replacement   Prep survey completed? Yes            ELEN RICH - Registered Nurse

## 2025-03-21 NOTE — PROGRESS NOTES
Spoke with Dorothy (sister) per verbal release, she stated [facility] is taking care of PT for patient.     Spoke with Rachelle to confirm that patient will be seen today. They will contact the patient to let them know what time they will be there today for therapy.

## 2025-03-27 ENCOUNTER — PATIENT OUTREACH (OUTPATIENT)
Dept: CASE MANAGEMENT | Facility: OTHER | Age: 85
End: 2025-03-27
Payer: MEDICARE

## 2025-03-27 NOTE — OUTREACH NOTE
AMBULATORY CASE MANAGEMENT NOTE    Names and Relationships of Patient/Support Persons: Contact: Seema Petit; Relationship: Self -     Patient Outreach    RN-ACM outreach to patient after CC referral; patient was at [facility] 3/14/25 - 3/17/25 for R shoulder arthroplasty. Patient is currently staying with her sister who also has limited mobility however, patient reports her sister has been able to assist as needed. Pt has not yet had any PT/OT but reports Home Health is scheduled to come out beginning next week. Patient states her soreness has improved and she is looking forward to therapy. Pt reports she already had limited mobility of L shoulder; R shoulder is able to be in and out of sling per pt. Pt v/u to education provided, is agreeable to [facility] calls. Care plan created, goals reviewed; future outreach scheduled. SDOH questionnaire sent via [employer]     Adult Patient Profile  Questions/Answers      Flowsheet Row Most Recent Value   Symptoms/Conditions Managed at Home musculoskeletal   Barriers to Managing Health age   Musculoskeletal Symptoms/Conditions mobility limited  [L arm limited mobility,  R arm recent surgery]   Musculoskeletal Management Strategies coping strategies, adequate rest, medication therapy   Musculoskeletal Self-Management Outcome well   Musculoskeletal Comment Pt will be receiving HH for PT/OT starting next week   Identifying Health Goals be more active, keep illness under control   Taking Medications Not Prescribed no   Missed Doses of Prescribed Medications During Past Week no   Taken Prescribed Medications at Different Time or Schedule During Past Week no   Taken More or Less Medication Than Prescribed no   Barriers to Taking Medication as Prescribed none            Education Documentation  unresolved or worsening signs/symptoms, taught by Michelle Estevez, RN at 3/27/2025  1:41 PM.  Learner: Patient  Readiness: Acceptance  Method: Explanation  Response: Verbalizes Understanding,  Needs Reinforcement    fall prevention, taught by Michelle Estevez, RN at 3/27/2025  1:41 PM.  Learner: Patient  Readiness: Acceptance  Method: Explanation  Response: Verbalizes Understanding, Needs Reinforcement    coping strategies, taught by Michelle Estevez, RN at 3/27/2025  1:41 PM.  Learner: Patient  Readiness: Acceptance  Method: Explanation  Response: Verbalizes Understanding, Needs Reinforcement    sleep/rest, taught by Michelle Estevez, RN at 3/27/2025  1:41 PM.  Learner: Patient  Readiness: Acceptance  Method: Explanation  Response: Verbalizes Understanding, Needs Reinforcement    pain management, taught by Michelle Estevez, RN at 3/27/2025  1:41 PM.  Learner: Patient  Readiness: Acceptance  Method: Explanation  Response: Verbalizes Understanding, Needs Reinforcement    signs and symptoms, taught by Michelle Estevez, RN at 3/27/2025  1:41 PM.  Learner: Patient  Readiness: Acceptance  Method: Explanation  Response: Verbalizes Understanding, Needs Reinforcement          Michelle LEONG  Ambulatory Case Management    3/27/2025, 13:42 EDT

## 2025-03-27 NOTE — PLAN OF CARE
Problem: Osteoarthritis  Goal: Manage Pain  Outcome: Progressing  Intervention: My Pain Management To Do List  Description: Why is this important?Day-to-day life can be hard when you have joint pain.Pain medicine is just one piece of the treatment puzzle. There are many things you can do to manage pain and stay strong.Lifestyle changes like stopping smoking and eating foods with Vitamin D and calcium keeps your bones and muscles healthy. Your joints are better when supported by strong muscles.You can try these action steps to help you manage your pain.  Flowsheets (Taken 3/27/2025 1341)  My Pain Management To Do List:   call for medicine refill 2 or 3 days before it runs out   keep track of prescription refills   prioritize tasks for the day   start a pain diary   stay active   track what makes the pain worse and what makes it better   use cold or heat for pain relief   track times pain is worst and when it is best   plan exercise or activity when pain is best controlled   develop a personal pain management plan   bring pain diary to all appointments  Goal: [place] with Pain  Outcome: Progressing  Intervention: My Pain Coping To Do List  Description: Why is this important?Living with joint pain and enjoying your life may be hard.Feelings like depression or anger can make your pain worse.Learning ways to cope may help you find some relief from the pain.  Flowsheets (Taken 3/27/2025 1341)  My Pain Coping To Do List:   learn relaxation techniques   spend time with positive people   use relaxation during pain   use distraction techniques   think of new ways to do favorite things   practice relaxation or meditation daily  Goal: Optimal Care Coordination of a Patient Experiencing Osteoarthritis  Outcome: Progressing  Intervention: Facilitate Multimodal Pain Management Plan  Flowsheets (Taken 3/27/2025 1341)  Facilitate Multimodal Pain Management Plan:   healthy lifestyle promoted   pain assessed   multimodal pain  management plan developed   response to pain management plan monitored   support and encouragement provided   response to pharmacologic therapy monitored  Intervention: Maintain or Improve Strength and Functional Ability  Flowsheets (Taken 3/27/2025 1341)  Maintain or Improve Strength and Functional Ability:   maximum independence promoted   incremental increase in activity promoted   adaptations to work and/or home environment encourage   activity or exercise based on tolerance encouraged   alternative therapy use encouraged   assistive or adaptive device use encouraged   broad range of activities explored   barriers to physical activity or exercise addressed   joint protection (orthoses) promoted   participation in rehabilitation therapy encouraged   preactivity medication encouraged   strategies to manage pain flare-up promoted

## 2025-05-05 ENCOUNTER — TELEPHONE (OUTPATIENT)
Age: 85
End: 2025-05-05
Payer: MEDICARE

## 2025-05-05 ENCOUNTER — PATIENT OUTREACH (OUTPATIENT)
Dept: CASE MANAGEMENT | Facility: OTHER | Age: 85
End: 2025-05-05
Payer: MEDICARE

## 2025-05-05 NOTE — TELEPHONE ENCOUNTER
Caller: CASE MANAGEMENT - AURELIANO BAUER    Relationship:     Best call back number: [phone]     What orders are you requesting (i.e. lab or imaging): MICHELLE VIRGEN WITH SEAT.    In what timeframe would the patient need to come in:     Where will you receive your lab/imaging services: HOME    Additional notes:

## 2025-05-05 NOTE — PLAN OF CARE
Problem: Osteoarthritis  Goal: Manage Pain  Outcome: Progressing  Intervention: My Pain Management To Do List  Description: Why is this important?Day-to-day life can be hard when you have joint pain.Pain medicine is just one piece of the treatment puzzle. There are many things you can do to manage pain and stay strong.Lifestyle changes like stopping smoking and eating foods with Vitamin D and calcium keeps your bones and muscles healthy. Your joints are better when supported by strong muscles.You can try these action steps to help you manage your pain.  Flowsheets (Taken 5/5/2025 1426)  My Pain Management To Do List:   call for medicine refill 2 or 3 days before it runs out   keep track of prescription refills   plan exercise or activity when pain is best controlled   prioritize tasks for the day   stay active   track what makes the pain worse and what makes it better   work slower and less intensely when having pain   use cold or heat for pain relief   track times pain is worst and when it is best  Goal: Accident with Pain  Outcome: Progressing  Goal: Optimal Care Coordination of a Patient Experiencing Osteoarthritis  Outcome: Progressing  Intervention: Identify and Reduce Risk to Safety  Flowsheets (Taken 5/5/2025 1426)  Identify and Reduce Risk to Safety:   assistive or adaptive device use encouraged   balance, gait and fall risk reviewed   quality of sleep assessed   barriers to safety identified and addressed   knowledge of how to summon emergency services ensured   careful application of heat or ice encouraged

## 2025-05-05 NOTE — OUTREACH NOTE
AMBULATORY CASE MANAGEMENT NOTE    Names and Relationships of Patient/Support Persons: Contact: Damaso, [place] Jose; Relationship: Self -     Patient Outreach    Scheduled outreach with patient for program progression. Patient reports Home Health did come out for therapy but only one visit. Patient states she is now attending outpatient PT/OT, has not been going long. Patient reports she is unable to walk without a walker but right now they are working on her shoulder. Pt is hopeful to regain some strength and mobility, states her walker is very old and she doesn't feel very safe. ACM discussed the option of a rollator walker with a seat; pt states she would like to get one if possible. ACM advised a provider order will be required and this will go to a DME provider; offered to let patient's PCP know of need/request, pt agreeable and appreciative of assistance. Patient is scheduled to see provider in office June 9th. Education provided, understanding voiced. Care plan reviewed, updated. Next outreach scheduled.     Care Coordination    RN-BAKARIM spoke with Jeni at the connection [facility] for [facility]; ACM advised of above request for a new walker/rollator with seat. Jeni advised she would send this message to the patient's provider; ACM contact information provided.     Education Documentation  Unresolved/Worsening Symptoms, taught by Michelle Estevez, RN at 5/5/2025  2:27 PM.  Learner: Patient  Readiness: Acceptance  Method: Explanation  Response: Verbalizes Understanding    Safety, taught by Michelle Estevez, RN at 5/5/2025  2:27 PM.  Learner: Patient  Readiness: Acceptance  Method: Explanation  Response: Verbalizes Understanding    Home Safety, taught by Michelle Estevez, RN at 5/5/2025  2:27 PM.  Learner: Patient  Readiness: Acceptance  Method: Explanation  Response: Verbalizes Understanding    Risk Factors, taught by Michelle Estevez, RN at 5/5/2025  2:27 PM.  Learner: Patient  Readiness: Acceptance  Method:  Explanation  Response: Verbalizes Understanding          Michelle LEONG  Ambulatory Case Management    5/5/2025, 14:27 EDT

## 2025-05-19 DIAGNOSIS — M25.562 ARTHRALGIA OF BOTH KNEES: Chronic | ICD-10-CM

## 2025-05-19 DIAGNOSIS — Z78.9 IMPAIRED MOBILITY AND ADLS: Primary | ICD-10-CM

## 2025-05-19 DIAGNOSIS — Z74.09 IMPAIRED MOBILITY AND ADLS: Primary | ICD-10-CM

## 2025-05-19 DIAGNOSIS — M15.0 PRIMARY OSTEOARTHRITIS INVOLVING MULTIPLE JOINTS: ICD-10-CM

## 2025-05-19 DIAGNOSIS — M62.81 MUSCLE WEAKNESS: ICD-10-CM

## 2025-05-19 DIAGNOSIS — M25.561 ARTHRALGIA OF BOTH KNEES: Chronic | ICD-10-CM

## 2025-05-19 RX ORDER — CALCIUM CARBONATE 160(400)MG
1 TABLET,CHEWABLE ORAL DAILY
Qty: 1 EACH | Refills: 0 | Status: SHIPPED | OUTPATIENT
Start: 2025-05-19

## 2025-05-23 DIAGNOSIS — M54.16 CHRONIC LUMBAR RADICULOPATHY: ICD-10-CM

## 2025-05-23 RX ORDER — PREGABALIN 50 MG/1
50 CAPSULE ORAL 2 TIMES DAILY
Qty: 60 CAPSULE | Refills: 2 | Status: SHIPPED | OUTPATIENT
Start: 2025-05-23

## 2025-05-23 NOTE — TELEPHONE ENCOUNTER
Rx Refill Note  Requested Prescriptions     Pending Prescriptions Disp Refills    pregabalin (LYRICA) 50 MG capsule 60 capsule 2     Sig: Take 1 capsule by mouth 2 (Two) Times a Day.      Last office visit with prescribing clinician: 3/4/2025   Last telemedicine visit with prescribing clinician: Visit date not found   Next office visit with prescribing clinician: 6/9/2025                         Would you like a call back once the refill request has been completed: [] Yes [] No    If the office needs to give you a call back, can they leave a voicemail: [] Yes [] No    Ara Cortez MA  05/23/25, 10:55 EDT

## 2025-05-29 RX ORDER — POTASSIUM CHLORIDE 750 MG/1
10 TABLET, EXTENDED RELEASE ORAL DAILY
Qty: 90 TABLET | Refills: 3 | Status: SHIPPED | OUTPATIENT
Start: 2025-05-29

## 2025-05-29 NOTE — TELEPHONE ENCOUNTER
Rx Refill Note  Requested Prescriptions     Pending Prescriptions Disp Refills    potassium chloride (KLOR-CON M10) 10 MEQ CR tablet [Pharmacy Med Name: potassium chloride ER 10 mEq tablet,extended release(part/cryst)] 90 tablet 3     Sig: TAKE ONE TABLET BY MOUTH ONCE DAILY      Last office visit with prescribing clinician: 3/4/2025   Last telemedicine visit with prescribing clinician: Visit date not found   Next office visit with prescribing clinician: 6/9/2025                         Would you like a call back once the refill request has been completed: [] Yes [] No    If the office needs to give you a call back, can they leave a voicemail: [] Yes [] No    Ara Cortez MA  05/29/25, 09:44 EDT

## 2025-06-09 ENCOUNTER — OFFICE VISIT (OUTPATIENT)
Age: 85
End: 2025-06-09
Payer: MEDICARE

## 2025-06-09 VITALS
DIASTOLIC BLOOD PRESSURE: 84 MMHG | BODY MASS INDEX: 34.15 KG/M2 | OXYGEN SATURATION: 98 % | HEIGHT: 64 IN | SYSTOLIC BLOOD PRESSURE: 120 MMHG | WEIGHT: 200 LBS | HEART RATE: 67 BPM

## 2025-06-09 DIAGNOSIS — I50.32 CHRONIC DIASTOLIC (CONGESTIVE) HEART FAILURE: ICD-10-CM

## 2025-06-09 DIAGNOSIS — M75.01 ADHESIVE CAPSULITIS OF BOTH SHOULDERS: ICD-10-CM

## 2025-06-09 DIAGNOSIS — M25.561 ARTHRALGIA OF BOTH KNEES: Chronic | ICD-10-CM

## 2025-06-09 DIAGNOSIS — I83.813 VARICOSE VEINS OF BILATERAL LOWER EXTREMITIES WITH PAIN: ICD-10-CM

## 2025-06-09 DIAGNOSIS — Z74.09 IMPAIRED MOBILITY AND ADLS: Primary | ICD-10-CM

## 2025-06-09 DIAGNOSIS — I10 ESSENTIAL HYPERTENSION: ICD-10-CM

## 2025-06-09 DIAGNOSIS — M15.0 PRIMARY OSTEOARTHRITIS INVOLVING MULTIPLE JOINTS: ICD-10-CM

## 2025-06-09 DIAGNOSIS — Z78.9 IMPAIRED MOBILITY AND ADLS: Primary | ICD-10-CM

## 2025-06-09 DIAGNOSIS — M25.562 ARTHRALGIA OF BOTH KNEES: Chronic | ICD-10-CM

## 2025-06-09 DIAGNOSIS — M75.02 ADHESIVE CAPSULITIS OF BOTH SHOULDERS: ICD-10-CM

## 2025-06-09 PROCEDURE — 1126F AMNT PAIN NOTED NONE PRSNT: CPT | Performed by: FAMILY MEDICINE

## 2025-06-09 PROCEDURE — 99214 OFFICE O/P EST MOD 30 MIN: CPT | Performed by: FAMILY MEDICINE

## 2025-06-09 PROCEDURE — 3079F DIAST BP 80-89 MM HG: CPT | Performed by: FAMILY MEDICINE

## 2025-06-09 PROCEDURE — 3074F SYST BP LT 130 MM HG: CPT | Performed by: FAMILY MEDICINE

## 2025-06-09 PROCEDURE — 96372 THER/PROPH/DIAG INJ SC/IM: CPT | Performed by: FAMILY MEDICINE

## 2025-06-09 RX ORDER — ACETAMINOPHEN 500 MG
1000 TABLET ORAL
COMMUNITY

## 2025-06-09 RX ORDER — OXYCODONE HYDROCHLORIDE 10 MG/1
10 TABLET ORAL
COMMUNITY
Start: 2025-03-14

## 2025-06-09 RX ORDER — METHYLPREDNISOLONE ACETATE 80 MG/ML
80 INJECTION, SUSPENSION INTRA-ARTICULAR; INTRALESIONAL; INTRAMUSCULAR; SOFT TISSUE ONCE
Status: COMPLETED | OUTPATIENT
Start: 2025-06-09 | End: 2025-06-09

## 2025-06-09 RX ORDER — KETOROLAC TROMETHAMINE 30 MG/ML
30 INJECTION, SOLUTION INTRAMUSCULAR; INTRAVENOUS ONCE
Status: COMPLETED | OUTPATIENT
Start: 2025-06-09 | End: 2025-06-09

## 2025-06-09 RX ADMIN — METHYLPREDNISOLONE ACETATE 80 MG: 80 INJECTION, SUSPENSION INTRA-ARTICULAR; INTRALESIONAL; INTRAMUSCULAR; SOFT TISSUE at 15:22

## 2025-06-09 RX ADMIN — KETOROLAC TROMETHAMINE 30 MG: 30 INJECTION, SOLUTION INTRAMUSCULAR; INTRAVENOUS at 15:22

## 2025-06-09 NOTE — PROGRESS NOTES
Established Patient        Chief Complaint:   Chief Complaint   Patient presents with    Congestive Heart Failure        Seema Burkett is a 84 y.o. female    History of Present Illness:   Here today for scheduled follow-up visit of diastolic CHF, hypertension, osteoarthritis of multiple joints, impaired mobility, varicose veins of lower extremities and arthralgia of bilateral knees.    Dr. Cotter performed R total shoulder arthroplasty.    Pt reports home program, as well as outpatient PT.  R arm dominant; has not progressed any since starting.  Severely limited in all ADLs, ambulates with walker, although still with sig difficulty.  She denies any new falls or injuries.    Denies chest pain, syncope, palpitations or vertigo.  Denies fever, chills or night sweats.  Maintains balanced dietary intake; reports good hydration habits.  Denies hematuria/dysuria.  Denies any BRB/BTS.  No new rashes.  Denies orthopnea, epistaxis or hemoptysis.      Subjective     The following portions of the patient's history were reviewed and updated as appropriate: allergies, current medications, past family history, past medical history, past social history, past surgical history and problem list.    Allergies   Allergen Reactions    Gabapentin (Once-Daily) Hallucinations    Quinine Unknown - Low Severity    Quinine Derivatives Other (See Comments)     FLU LIKE SYMPTOMS       Review of Systems  Constitutional: Negative for fever. Negative for chills, diaphoresis, fatigue and unexpected weight change.   HENT: No dysphagia; no changes to vision/hearing/smell/taste; no epistaxis  Eyes: Negative for redness and visual disturbance.   Respiratory: negative for shortness of breath. Negative for chest pain . Negative for cough and chest tightness.   Cardiovascular: Negative for chest pain and palpitations.   Gastrointestinal: Negative for abdominal distention, abdominal pain and blood in stool.   Endocrine:  "Negative for cold intolerance and heat intolerance.   Genitourinary: Negative for difficulty urinating, dysuria and frequency.   Musculoskeletal: Chronic arthralgias of bilateral knees.  Impaired ROM to bilateral shoulders.  Right arm dominant.  Skin: Postsurgical changes of expected nature.  Neurological: Negative for syncope, weakness and headaches.   Hematological: Negative for adenopathy. Does not bruise/bleed easily.   Psychiatric/Behavioral: Negative for confusion. The patient is not nervous/anxious.    Objective     Physical Exam   Vital Signs: /84   Pulse 67   Ht 162.6 cm (64\")   Wt 90.7 kg (200 lb)   SpO2 98%   BMI 34.33 kg/m²       Patient's (Body mass index is 34.33 kg/m².) indicates that they are obese (BMI >30) with health related conditions that include hypertension, diabetes mellitus, and osteoarthritis . Weight is improving with lifestyle modifications. BMI is is above average; BMI management plan is completed. We discussed portion control and increasing exercise.      General Appearance: alert, oriented x 3, no acute distress.  Skin: warm and dry.   HEENT: Atraumatic.  pupils round and reactive to light and accommodation, oral mucosa pink and moist.  Nares patent without epistaxis.  External auditory canals are patent tympanic membranes intact.  Neck: supple, no JVD, trachea midline.  No thyromegaly  Lungs: CTA, unlabored breathing effort.  Heart: RRR, normal S1 and S2, no S3, no rub.  Abdomen: soft, non-tender, no palpable bladder, present bowel sounds to auscultation ×4.  No guarding or rigidity.  Extremities: no clubbing, cyanosis.  Expected gravity dependent edema bilateral lower extremities.  Postsurgical scarring noted to the anterior bilateral knees.  There is mild tenderness on medial/lateral joint line tenderness of the knees.  Yasmine/Mackenzie sign negative.  Normal dorsiflexion/plantarflexion of the feet.  Severe impairment to ROM A/P of bilateral shoulders; unable to perform " Apley's scratch test; pos Burnet's.   symmetric.  Normal flex/ext at elbows, normal supination/pronation.  Neuro: normal speech and mental status.  Cranial nerves II through XII intact.  No anosmia. DTR 2+; proprioception intact.  No focal motor/sensory deficits.        Assessment and Plan      Assessment/Plan:   Diagnoses and all orders for this visit:    1. Impaired mobility and ADLs (Primary)    2. Primary osteoarthritis involving multiple joints    3. Essential hypertension    4. Chronic diastolic (congestive) heart failure    5. Varicose veins of bilateral lower extremities with pain    6. Arthralgia of both knees    7. Adhesive capsulitis of both shoulders  -     methylPREDNISolone acetate (DEPO-medrol) injection 80 mg  -     ketorolac (TORADOL) injection 30 mg      Severe restriction to ROM to marina shoulders, in actuality both are nearly non-functional for ADLs.  Will provide her with a Depomedrol 80 mg and Ketoralac 30 mg IM injections today in office, hopeful for improved outcome with PT tomorrow.      Keep scheduled f/u appt with orthopedic surgery.    Vital signs demonstrate hemodynamic stability, blood pressure is at goal.  Continue low-sodium/salt dietary intake, as well as frequent weight evaluations.  Demonstrates no findings of acute volume overload at this time.    Continue current analgesic regimen.    Discussion Summary:    Discussed plan of care in detail with pt today; pt verb understanding and agrees.    I spent 35 minutes caring for [place] on this date of service. This time includes time spent by me in the following activities:preparing for the visit, performing a medically appropriate examination and/or evaluation , counseling and educating the patient/family/caregiver, ordering medications, tests, or procedures, documenting information in the medical record, and care coordination    I confirm accuracy of unchanged data/findings which have been carried forward from previous visit, as  well as I have updated appropriately those that have changed.    Follow up:  No follow-ups on file.     There are no Patient Instructions on file for this visit.    Lan Hollins DO  06/09/25  14:52 EDT

## 2025-06-26 ENCOUNTER — READMISSION MANAGEMENT (OUTPATIENT)
Dept: CALL CENTER | Facility: HOSPITAL | Age: 85
End: 2025-06-26
Payer: MEDICARE

## 2025-06-27 NOTE — OUTREACH NOTE
Prep Survey      Flowsheet Row Responses   [facility] facility patient discharged from? Non-[facility]   Is LACE score < 7 ? Non-BH Discharge   Eligibility Not Eligible   What are the reasons patient is not eligible? [facility]   Does the patient have one of the following disease processes/diagnoses(primary or secondary)? Total Joint Replacement   Prep survey completed? Yes            ALVIN TILLMAN - Registered Nurse

## 2025-07-15 ENCOUNTER — OFFICE VISIT (OUTPATIENT)
Age: 85
End: 2025-07-15
Payer: MEDICARE

## 2025-07-15 VITALS
BODY MASS INDEX: 34.15 KG/M2 | SYSTOLIC BLOOD PRESSURE: 122 MMHG | HEIGHT: 64 IN | OXYGEN SATURATION: 96 % | WEIGHT: 200 LBS | HEART RATE: 69 BPM | DIASTOLIC BLOOD PRESSURE: 82 MMHG

## 2025-07-15 DIAGNOSIS — Z78.9 IMPAIRED MOBILITY AND ADLS: ICD-10-CM

## 2025-07-15 DIAGNOSIS — G47.33 OBSTRUCTIVE SLEEP APNEA ON CPAP: ICD-10-CM

## 2025-07-15 DIAGNOSIS — Z96.611 HISTORY OF REVISION OF TOTAL REPLACEMENT OF RIGHT SHOULDER JOINT: Primary | ICD-10-CM

## 2025-07-15 DIAGNOSIS — I10 ESSENTIAL HYPERTENSION: ICD-10-CM

## 2025-07-15 DIAGNOSIS — Z74.09 IMPAIRED MOBILITY AND ADLS: ICD-10-CM

## 2025-07-15 DIAGNOSIS — I50.32 CHRONIC DIASTOLIC (CONGESTIVE) HEART FAILURE: ICD-10-CM

## 2025-07-15 DIAGNOSIS — M15.0 PRIMARY OSTEOARTHRITIS INVOLVING MULTIPLE JOINTS: ICD-10-CM

## 2025-07-15 PROCEDURE — 99214 OFFICE O/P EST MOD 30 MIN: CPT | Performed by: FAMILY MEDICINE

## 2025-07-15 PROCEDURE — 3074F SYST BP LT 130 MM HG: CPT | Performed by: FAMILY MEDICINE

## 2025-07-15 PROCEDURE — 1126F AMNT PAIN NOTED NONE PRSNT: CPT | Performed by: FAMILY MEDICINE

## 2025-07-15 PROCEDURE — 3079F DIAST BP 80-89 MM HG: CPT | Performed by: FAMILY MEDICINE

## 2025-07-15 NOTE — PROGRESS NOTES
Established Patient        Chief Complaint:   Chief Complaint   Patient presents with    Hospital Follow Up Visit     Joint surgery failure        Seema Burkett is a 84 y.o. female    History of Present Illness:   Here in scheduled follow-up visit of obstructive sleep apnea, osteoarthritis of multiple joints, chronic diastolic CHF, hypertension and recent history of revision of right total shoulder arthroplasty.    S/P revision of right shoulder arthroplasy; patient reports pain well-controlled.    Good nutritional intake.  No new falls/injuries.  Now home from [facility], planning to start home PT/OT within next few days.    Denies chest pain, syncope, palpitations or vertigo.  Denies fever, chills or night sweats.  Maintains an active lifestyle, balanced dietary intake; reports good hydration habits.  Denies hematuria/dysuria.  Denies any BRB/BTS.  No new rashes.  Denies orthopnea, epistaxis or hemoptysis.      Subjective     The following portions of the patient's history were reviewed and updated as appropriate: allergies, current medications, past family history, past medical history, past social history, past surgical history and problem list.    Allergies   Allergen Reactions    Gabapentin (Once-Daily) Hallucinations    Quinine Unknown - Low Severity    Quinine Derivatives Other (See Comments)     FLU LIKE SYMPTOMS       Review of Systems  Constitutional: Negative for fever. Negative for chills, diaphoresis, fatigue and unexpected weight change.   HENT: No dysphagia; no changes to vision/hearing/smell/taste; no epistaxis  Eyes: Negative for redness and visual disturbance.   Respiratory: negative for shortness of breath. Negative for chest pain . Negative for cough and chest tightness.   Cardiovascular: Negative for chest pain and palpitations.   Gastrointestinal: Negative for abdominal distention, abdominal pain and blood in stool.   Endocrine: Negative for cold intolerance and  "heat intolerance.   Genitourinary: Negative for difficulty urinating, dysuria and frequency.   Musculoskeletal: Chronic arthralgias of bilateral knees.  Impaired ROM to bilateral shoulders.  Right arm dominant.  Skin: Postsurgical changes of expected nature.  Neurological: Negative for syncope, weakness and headaches.   Hematological: Negative for adenopathy. Does not bruise/bleed easily.   Psychiatric/Behavioral: Negative for confusion. The patient is not nervous/anxious.    Objective     Physical Exam   Vital Signs: /82   Pulse 69   Ht 162.6 cm (64\")   Wt 90.7 kg (200 lb)   SpO2 96%   BMI 34.33 kg/m²       Patient's (Body mass index is 34.33 kg/m².) indicates that they are obese (BMI >30) with health related conditions that include hypertension, diabetes mellitus, and osteoarthritis . Weight is improving with lifestyle modifications. BMI is is above average; BMI management plan is completed. We discussed portion control and increasing exercise.      General Appearance: alert, oriented x 3, no acute distress.  Skin: warm and dry.   HEENT: Atraumatic.  pupils round and reactive to light and accommodation, oral mucosa pink and moist.  Nares patent without epistaxis.  External auditory canals are patent tympanic membranes intact.  Neck: supple, no JVD, trachea midline.  No thyromegaly  Lungs: CTA, unlabored breathing effort.  Heart: RRR, normal S1 and S2, no S3, no rub.  Abdomen: soft, non-tender, no palpable bladder, present bowel sounds to auscultation ×4.  No guarding or rigidity.  Extremities: no clubbing, cyanosis.  Expected gravity dependent edema bilateral lower extremities.  Postsurgical scarring noted to the anterior bilateral knees.  There is mild tenderness on medial/lateral joint line tenderness of the knees.  Yasmine/Mackenzie sign negative.  Normal dorsiflexion/plantarflexion of the feet.  Severe impairment to ROM A/P of bilateral shoulders; unable to perform Apley's scratch test; pos Power's.  "  symmetric.  Normal flex/ext at elbows, normal supination/pronation.  Neuro: normal speech and mental status.  Cranial nerves II through XII intact.  No anosmia. DTR 2+; proprioception intact.  No focal motor/sensory deficits.        Assessment and Plan      Assessment/Plan:   Diagnoses and all orders for this visit:    1. 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 Impaired mobility and ADLs    3. Obstructive sleep apnea on CPAP    4. Primary osteoarthritis involving multiple joints    5. Chronic diastolic (congestive) heart failure    6. Essential hypertension        Patient has been erroneously marked as diabetic. Based on the available clinical information, she does not have diabetes and should therefore be excluded from diabetic health maintenance and quality measures for the remainder of the reporting period.      Keep scheduled follow-up appointment with orthopedic surgery.    [facility], Subsequent, at next f/u; will get surveillance labs at that time.    VSS, appears HD asymptomatic today.    Continue low-sodium/salt dietary intake, frequent weight evaluations.  Demonstrates no findings of acute volume overload.    Continue NIPPV treatment of CLIVE.    Pain appears clinically well-controlled.    Discussion Summary:    Discussed plan of care in detail with pt today; pt verb understanding and agrees.    I spent 30 minutes caring for [place] on this date of service. This time includes time spent by me in the following activities:preparing for the visit, performing a medically appropriate examination and/or evaluation , counseling and educating the patient/family/caregiver, ordering medications, tests, or procedures, documenting information in the medical record, and care coordination    I confirm accuracy of unchanged data/findings which have been carried forward from previous visit, as well as I have updated appropriately those that have changed.    Follow up:  No  follow-ups on file.     There are no Patient Instructions on file for this visit.    Lan Hollins DO  07/15/25  11:31 EDT

## 2025-07-23 ENCOUNTER — TELEPHONE (OUTPATIENT)
Age: 85
End: 2025-07-23
Payer: MEDICARE

## 2025-07-23 NOTE — TELEPHONE ENCOUNTER
ERNST FROM [address] IN [place] CALLED IN REGARDS TO [place] BEING IN DIRE NEED OF A REFERRAL FOR HER LOWER BACK FOR PT. CALL BACK #[phone] FOR ERNST AND FAX # [phone] IF CAN FAX IT ON THROUGH. SHE DID ADVISE THAT SHE NEEDS IT PRETTY BAD. TY  PLEASE ADVISE.

## 2025-08-20 ENCOUNTER — PATIENT OUTREACH (OUTPATIENT)
Dept: CASE MANAGEMENT | Facility: OTHER | Age: 85
End: 2025-08-20
Payer: MEDICARE